# Patient Record
Sex: FEMALE | Race: WHITE | HISPANIC OR LATINO | URBAN - METROPOLITAN AREA
[De-identification: names, ages, dates, MRNs, and addresses within clinical notes are randomized per-mention and may not be internally consistent; named-entity substitution may affect disease eponyms.]

---

## 2023-03-03 ENCOUNTER — EMERGENCY (EMERGENCY)
Facility: HOSPITAL | Age: 28
LOS: 0 days | Discharge: HOME | End: 2023-03-04
Attending: STUDENT IN AN ORGANIZED HEALTH CARE EDUCATION/TRAINING PROGRAM
Payer: MEDICAID

## 2023-03-03 VITALS
OXYGEN SATURATION: 97 % | RESPIRATION RATE: 18 BRPM | HEART RATE: 99 BPM | SYSTOLIC BLOOD PRESSURE: 128 MMHG | HEIGHT: 60 IN | WEIGHT: 156.53 LBS | DIASTOLIC BLOOD PRESSURE: 88 MMHG | TEMPERATURE: 99 F

## 2023-03-03 DIAGNOSIS — R10.31 RIGHT LOWER QUADRANT PAIN: ICD-10-CM

## 2023-03-03 DIAGNOSIS — R13.10 DYSPHAGIA, UNSPECIFIED: ICD-10-CM

## 2023-03-03 DIAGNOSIS — R11.0 NAUSEA: ICD-10-CM

## 2023-03-03 LAB
ALBUMIN SERPL ELPH-MCNC: 5 G/DL — SIGNIFICANT CHANGE UP (ref 3.5–5.2)
ALP SERPL-CCNC: 74 U/L — SIGNIFICANT CHANGE UP (ref 30–115)
ALT FLD-CCNC: 54 U/L — HIGH (ref 0–41)
ANION GAP SERPL CALC-SCNC: 12 MMOL/L — SIGNIFICANT CHANGE UP (ref 7–14)
APPEARANCE UR: CLEAR — SIGNIFICANT CHANGE UP
AST SERPL-CCNC: 31 U/L — SIGNIFICANT CHANGE UP (ref 0–41)
BASOPHILS # BLD AUTO: 0.05 K/UL — SIGNIFICANT CHANGE UP (ref 0–0.2)
BASOPHILS NFR BLD AUTO: 0.5 % — SIGNIFICANT CHANGE UP (ref 0–1)
BILIRUB SERPL-MCNC: 0.4 MG/DL — SIGNIFICANT CHANGE UP (ref 0.2–1.2)
BILIRUB UR-MCNC: NEGATIVE — SIGNIFICANT CHANGE UP
BUN SERPL-MCNC: 9 MG/DL — LOW (ref 10–20)
CALCIUM SERPL-MCNC: 10.1 MG/DL — SIGNIFICANT CHANGE UP (ref 8.4–10.5)
CHLORIDE SERPL-SCNC: 104 MMOL/L — SIGNIFICANT CHANGE UP (ref 98–110)
CO2 SERPL-SCNC: 24 MMOL/L — SIGNIFICANT CHANGE UP (ref 17–32)
COLOR SPEC: SIGNIFICANT CHANGE UP
CREAT SERPL-MCNC: 0.6 MG/DL — LOW (ref 0.7–1.5)
DIFF PNL FLD: NEGATIVE — SIGNIFICANT CHANGE UP
EGFR: 125 ML/MIN/1.73M2 — SIGNIFICANT CHANGE UP
EOSINOPHIL # BLD AUTO: 0.1 K/UL — SIGNIFICANT CHANGE UP (ref 0–0.7)
EOSINOPHIL NFR BLD AUTO: 0.9 % — SIGNIFICANT CHANGE UP (ref 0–8)
GLUCOSE SERPL-MCNC: 87 MG/DL — SIGNIFICANT CHANGE UP (ref 70–99)
GLUCOSE UR QL: NEGATIVE — SIGNIFICANT CHANGE UP
HCG SERPL QL: NEGATIVE — SIGNIFICANT CHANGE UP
HCT VFR BLD CALC: 39 % — SIGNIFICANT CHANGE UP (ref 37–47)
HGB BLD-MCNC: 13.1 G/DL — SIGNIFICANT CHANGE UP (ref 12–16)
IMM GRANULOCYTES NFR BLD AUTO: 0.4 % — HIGH (ref 0.1–0.3)
KETONES UR-MCNC: ABNORMAL
LEUKOCYTE ESTERASE UR-ACNC: NEGATIVE — SIGNIFICANT CHANGE UP
LIDOCAIN IGE QN: 27 U/L — SIGNIFICANT CHANGE UP (ref 7–60)
LYMPHOCYTES # BLD AUTO: 3.22 K/UL — SIGNIFICANT CHANGE UP (ref 1.2–3.4)
LYMPHOCYTES # BLD AUTO: 30.1 % — SIGNIFICANT CHANGE UP (ref 20.5–51.1)
MCHC RBC-ENTMCNC: 29.8 PG — SIGNIFICANT CHANGE UP (ref 27–31)
MCHC RBC-ENTMCNC: 33.6 G/DL — SIGNIFICANT CHANGE UP (ref 32–37)
MCV RBC AUTO: 88.8 FL — SIGNIFICANT CHANGE UP (ref 81–99)
MONOCYTES # BLD AUTO: 0.62 K/UL — HIGH (ref 0.1–0.6)
MONOCYTES NFR BLD AUTO: 5.8 % — SIGNIFICANT CHANGE UP (ref 1.7–9.3)
NEUTROPHILS # BLD AUTO: 6.65 K/UL — HIGH (ref 1.4–6.5)
NEUTROPHILS NFR BLD AUTO: 62.3 % — SIGNIFICANT CHANGE UP (ref 42.2–75.2)
NITRITE UR-MCNC: NEGATIVE — SIGNIFICANT CHANGE UP
NRBC # BLD: 0 /100 WBCS — SIGNIFICANT CHANGE UP (ref 0–0)
PH UR: 7 — SIGNIFICANT CHANGE UP (ref 5–8)
PLATELET # BLD AUTO: 307 K/UL — SIGNIFICANT CHANGE UP (ref 130–400)
POTASSIUM SERPL-MCNC: 4 MMOL/L — SIGNIFICANT CHANGE UP (ref 3.5–5)
POTASSIUM SERPL-SCNC: 4 MMOL/L — SIGNIFICANT CHANGE UP (ref 3.5–5)
PROT SERPL-MCNC: 7.5 G/DL — SIGNIFICANT CHANGE UP (ref 6–8)
PROT UR-MCNC: NEGATIVE — SIGNIFICANT CHANGE UP
RBC # BLD: 4.39 M/UL — SIGNIFICANT CHANGE UP (ref 4.2–5.4)
RBC # FLD: 12.5 % — SIGNIFICANT CHANGE UP (ref 11.5–14.5)
SODIUM SERPL-SCNC: 140 MMOL/L — SIGNIFICANT CHANGE UP (ref 135–146)
SP GR SPEC: 1.01 — SIGNIFICANT CHANGE UP (ref 1.01–1.03)
UROBILINOGEN FLD QL: SIGNIFICANT CHANGE UP
WBC # BLD: 10.68 K/UL — SIGNIFICANT CHANGE UP (ref 4.8–10.8)
WBC # FLD AUTO: 10.68 K/UL — SIGNIFICANT CHANGE UP (ref 4.8–10.8)

## 2023-03-03 PROCEDURE — 99284 EMERGENCY DEPT VISIT MOD MDM: CPT

## 2023-03-03 PROCEDURE — 83690 ASSAY OF LIPASE: CPT

## 2023-03-03 PROCEDURE — 36415 COLL VENOUS BLD VENIPUNCTURE: CPT

## 2023-03-03 PROCEDURE — 87086 URINE CULTURE/COLONY COUNT: CPT

## 2023-03-03 PROCEDURE — 74177 CT ABD & PELVIS W/CONTRAST: CPT | Mod: MA

## 2023-03-03 PROCEDURE — 96375 TX/PRO/DX INJ NEW DRUG ADDON: CPT

## 2023-03-03 PROCEDURE — 99284 EMERGENCY DEPT VISIT MOD MDM: CPT | Mod: 25

## 2023-03-03 PROCEDURE — 80053 COMPREHEN METABOLIC PANEL: CPT

## 2023-03-03 PROCEDURE — 81003 URINALYSIS AUTO W/O SCOPE: CPT

## 2023-03-03 PROCEDURE — 76830 TRANSVAGINAL US NON-OB: CPT

## 2023-03-03 PROCEDURE — 84703 CHORIONIC GONADOTROPIN ASSAY: CPT

## 2023-03-03 PROCEDURE — 96374 THER/PROPH/DIAG INJ IV PUSH: CPT

## 2023-03-03 PROCEDURE — 70491 CT SOFT TISSUE NECK W/DYE: CPT | Mod: MA

## 2023-03-03 PROCEDURE — 85025 COMPLETE CBC W/AUTO DIFF WBC: CPT

## 2023-03-03 RX ORDER — ONDANSETRON 8 MG/1
4 TABLET, FILM COATED ORAL ONCE
Refills: 0 | Status: COMPLETED | OUTPATIENT
Start: 2023-03-03 | End: 2023-03-03

## 2023-03-03 RX ORDER — FAMOTIDINE 10 MG/ML
20 INJECTION INTRAVENOUS ONCE
Refills: 0 | Status: COMPLETED | OUTPATIENT
Start: 2023-03-03 | End: 2023-03-03

## 2023-03-03 RX ADMIN — FAMOTIDINE 100 MILLIGRAM(S): 10 INJECTION INTRAVENOUS at 22:35

## 2023-03-03 RX ADMIN — ONDANSETRON 4 MILLIGRAM(S): 8 TABLET, FILM COATED ORAL at 22:35

## 2023-03-03 NOTE — ED PROVIDER NOTE - CARE PROVIDERS DIRECT ADDRESSES
[email or web address]

## 2023-03-03 NOTE — ED ADULT TRIAGE NOTE - CHIEF COMPLAINT QUOTE
She has abdominal pain and she feels nauseous, her last period is February 15th, whatever she eats she's vomiting ans she feels like the food stays in her throat - friend

## 2023-03-03 NOTE — ED PROVIDER NOTE - CARE PROVIDER_API CALL
Shey Conway)  Gastroenterology  [address] [place] [address]  Phone: [phone]  Fax: [phone]  Follow Up Time: 1-3 Days    Bruce Esquivel)  Otolaryngology  [address] [place] [address], [address] [place] [address]  Phone: [phone]  Fax: [phone]  Follow Up Time: 1-3 Days

## 2023-03-03 NOTE — ED PROVIDER NOTE - OBJECTIVE STATEMENT
Pt is a 26 y/o female with no PMH presenting for abdominal pain x 4 days. Pain is in RLQ and associated with nausea. Endorses feeling like food gets stuck in her throat when she eats. Has been tolerating liquids but occasionally feels like it's stuck. No vomiting. No dysuria or diarrhea. LMP 2/15

## 2023-03-03 NOTE — ED PROVIDER NOTE - PROGRESS NOTE DETAILS
PS: Pt tolerated po well. No abdominal pain, abdomen soft and nontender. Will dc to follow up with GI and ENT

## 2023-03-03 NOTE — ED PROVIDER NOTE - PHYSICAL EXAMINATION
CONST: well appearing for age  HEAD:  normocephalic, atraumatic  EYES:  conjunctivae without injection, drainage or discharge  ENMT:  nasal mucosa moist; mouth moist without ulcerations or lesions; throat moist without erythema, exudate, ulcerations or lesions  NECK:  supple  CARDIAC:  regular rate and rhythm, normal S1 and S2, no murmurs, rubs or gallops  RESP:  respiratory rate and effort appear normal for age; lungs are clear to auscultation bilaterally; no rales or wheezes  ABDOMEN:  soft, + RLQ tenderness, no CVA tenderness  MUSCULOSKELETAL/NEURO:  normal movement, normal tone  SKIN:  normal skin color for age and race, well-perfused; warm and dry

## 2023-03-03 NOTE — ED ADULT NURSE NOTE - NS ED NOTE ABUSE RESPONSE YN
Chart reviewed, Oumou WALKER contacted Sky today and spoke with him regarding need to see MFM.  Dr. Nelson also spoke with him last week regarding same issue.     Yes

## 2023-03-03 NOTE — ED PROVIDER NOTE - PROVIDER TOKENS
PROVIDER:[TOKEN:[[id number]],FOLLOWUP:[1-3 Days]],PROVIDER:[TOKEN:[[id number]],FOLLOWUP:[1-3 Days]]

## 2023-03-03 NOTE — ED PROVIDER NOTE - NSFOLLOWUPINSTRUCTIONS_ED_ALL_ED_FT
Disfagia    Dysphagia       La disfagia es un problema para tragar. Esta afección se produce cuando los sólidos y líquidos se pegan a la garganta de esmer persona en chisholm recorrido hasta el estómago o cuando el alimento tarda más tiempo del habitual en llegar al estómago.    Puede tener problemas para tragar alimentos, líquidos o ambos. También puede tener dolor cuando intenta tragar. Puede llevarle más tiempo y esfuerzo tragar algo.      ¿Cuáles son las causas?    Esta afección puede ser causada por lo siguiente:  •Problemas musculares. Es posible que estos le dificulten el paso de los alimentos y líquidos por el esófago, el tubo que conecta la boca con el estómago.    •Obstrucciones. Puede tener úlceras, tejido cicatricial o inflamación que bloquea el paso normal de los alimentos y líquidos. Las causas de estos problemas incluyen las siguientes:  •Reflujo ácido desde el estómago hacia el esófago (reflujo gastroesofágico).      •Infecciones.      •Radioterapia para tratar el cáncer.      •Medicamentos que se marsha sin la cantidad suficiente de líquido para empujarlos hacia el estómago.        •Accidente cerebrovascular. Puede afectar los nervios y hacer que sea difícil tragar.      •Problemas neurológicos. Estos impiden que se envíen señales a los músculos del esófago para que se aprieten (contraigan) y muevan lo que traga hacia el estómago.      •Globo faríngeo. Puja es un problema común que consiste en sentir fabio si hubiera esmer obstrucción en la garganta o esmer sensación de problema para tragar incluso si no hay nada mal en las vías de deglución.      •Algunas afecciones, tales fabio parálisis cerebral o enfermedad de Parkinson.        ¿Cuáles son los signos o síntomas?    Los síntomas frecuentes de esta afección incluyen los siguientes:  •Esmer sensación de que los sólidos o líquidos se traban en la garganta antes de llegar al estómago.      •Dolor al tragar.      •Tos o arcadas cuando intenta tragar.      Otros síntomas pueden incluir los siguientes:  •Que los alimentos vuelvan del estómago a la boca (regurgitación).      •Ruidos provenientes de la garganta.      •Molestias en el pecho al tragar.      •Sensación de saciedad cuando traga.      •Babeo, especialmente cuando la garganta está obstruida.      •Acidez estomacal.        ¿Cómo se diagnostica?    Puja trastorno puede diagnosticarse mediante:  •Radiografia con deglución de bario. En esta prueba, tragará un líquido bajwa que se pega en la parte interna del esófago. Luego se marsha radiografías.      •Endoscopía. En esta prueba, se inserta un telescopio flexible por la garganta para dominic el esófago y el estómago.      •Exploraciones por tomografía computarizada (TC) o resonancias magnéticas (RM).        ¿Cómo se trata?    El tratamiento de la disfagia depende de la causa de esta afección:  •Si la causa de la disfagia es el reflujo ácido o esmer infección podrán indicarle medicamentos. Estos pueden incluir antibióticos o medicamentos para la acidez estomacal.      •Si la causa de la disfagia son problemas en los músculos, será necesario seguir esmer terapia para fortalecer estos músculos que intervienen en la deglución. Puede tener que hacer ejercicios específicos para fortalecer o estirar los músculos.      •Si la causa es esmer obstrucción o un bulto, se realizarán procedimientos para remover la obstrucción. Es posible que necesite esmer cirugía y esmer sonda de alimentación.      Puede necesitar hacer cambios en la dieta. Pida instrucciones específicas a chisholm médico.      Siga estas instrucciones en chisholm casa:    Medicamentos     •Use los medicamentos de venta kiesha y los recetados solamente fabio se lo haya indicado el médico.      •Si le recetaron un antibiótico, tómelo fabio se lo haya indicado el médico. No deje de rell el antibiótico aunque comience a sentirse mejor.        Comida y bebida      •Mohan los cambios en la dieta que le haya indicado el médico.      •Trabaje con un especialista en alimentación y nutrición (nutricionista) para crear un plan de alimentación que le ayude a obtener los nutrientes que necesita a fin de mantenerse debbie.      •Coma alimentos blandos que shaun fáciles de tragar.      •Carmella los alimentos en trozos pequeños y coma lentamente. Ingiera bocados pequeños.      •Manténgase sentado y erguido para comer y beber.      • No consuma alcohol ni cafeína. Si necesita ayuda para dejar de consumir estos productos, consulte al médico.      Indicaciones generales     •Controle chisholm peso todos los días para asegurarse de que no está perdiendo peso.      • No consuma ningún producto que contenga nicotina o tabaco. Estos productos incluyen cigarrillos, tabaco para mascar y aparatos de vapeo, fabio los cigarrillos electrónicos. Si necesita ayuda para dejar de consumir estos productos, consulte al médico.      •Cumpla con todas las visitas de seguimiento. [place] es importante.        Comuníquese con un médico si:    •Pierde peso porque no puede tragar.      •Tose cuando gia líquidos.      •Tose y elimina comida parcialmente digerida.        Solicite ayuda de inmediato si:    •No puede tragar la saliva.      •Tiene dificultad para respirar, tiene fiebre o ambos.      •Tiene la voz ronca y tiene problemas para tragar.      Estos síntomas pueden representar un problema grave que constituye esmer emergencia. No espere a dominic si los síntomas desaparecen. Solicite atención médica de inmediato. Comuníquese con el servicio de emergencias de chisholm localidad (911 en los [place]). No conduzca por leisa propios medios hasta el hospital.       Resumen    •La disfagia es un problema para tragar. Esta afección se produce cuando los sólidos y líquidos se quedan en la garganta de esmer persona en chisholm recorrido hasta el estómago. Es posible que tenga tos o arcadas cuando intenta tragar.      •La disfagia tiene muchas causas posibles.      •El tratamiento de la disfagia depende de la causa de la afección.      •Cumpla con todas las visitas de seguimiento. [place] es importante.      Esta información no tiene fabio fin reemplazar el consejo del médico. Asegúrese de hacerle al médico cualquier pregunta que tenga.

## 2023-03-03 NOTE — ED PROVIDER NOTE - ATTENDING CONTRIBUTION TO CARE
27-year-old female no past medical history Lithuanian-speaking  ID [id number] presenting here complaining of 4 days of right lower quadrant abdominal pain and nausea also endorsing feeling food stuck in her throat denies any drooling or changes in her voice no fevers or chills   CONSTITUTIONAL: WA / WN / NAD  HEAD: NCAT  EYES: PERRL; EOMI;   ENT: Normal pharynx; mucous membranes pink/moist, no erythema. airway patent uvula midline  NECK: Supple;   CARD: RRR; nl S1/S2; no M/R/G. Pulses equal bilaterally.  RESP: Respiratory rate and effort are normal; breath sounds clear and equal bilaterally.  ABD: Soft, ND + rlq & periumbilica ttp  MSK/EXT: No gross deformities; full range of motion.  SKIN: Warm and dry;   NEURO: AAOx3, Motor 5/5 x 4 extremities  PSYCH: Memory Intact, Normal Affect

## 2023-03-03 NOTE — ED PROVIDER NOTE - PATIENT PORTAL LINK FT
You can access the FollowMyHealth Patient Portal offered by [facility] by registering at the following website: [email or web address]. By joining [employer]’s FollowMyHealth portal, you will also be able to view your health information using other applications (apps) compatible with our system.

## 2023-03-04 VITALS
OXYGEN SATURATION: 95 % | RESPIRATION RATE: 18 BRPM | HEART RATE: 95 BPM | TEMPERATURE: 98 F | DIASTOLIC BLOOD PRESSURE: 72 MMHG | SYSTOLIC BLOOD PRESSURE: 126 MMHG

## 2023-03-04 PROCEDURE — 76830 TRANSVAGINAL US NON-OB: CPT | Mod: 26

## 2023-03-04 PROCEDURE — 70491 CT SOFT TISSUE NECK W/DYE: CPT | Mod: 26,MA

## 2023-03-04 PROCEDURE — 74177 CT ABD & PELVIS W/CONTRAST: CPT | Mod: 26,MA

## 2023-03-04 RX ORDER — ONDANSETRON 8 MG/1
1 TABLET, FILM COATED ORAL
Qty: 6 | Refills: 0
Start: 2023-03-04 | End: 2023-03-05

## 2023-03-04 NOTE — ED ADULT NURSE REASSESSMENT NOTE - NS ED NURSE REASSESS COMMENT FT1
Pt remains AxOx4. Awaiting final CT results and ultrasound results. NAD. Safety measures maintained. Purposeful rounding maintained.

## 2023-03-04 NOTE — ED POST DISCHARGE NOTE - DETAILS
recommended gyn follow up for ultrasound of pelvis SPOKE WITH PATIENT IN Armenian. REFER. COORD. WILL MAKE GYN APPT.

## 2023-03-05 LAB
CULTURE RESULTS: SIGNIFICANT CHANGE UP
SPECIMEN SOURCE: SIGNIFICANT CHANGE UP

## 2023-03-06 PROBLEM — Z00.00 ENCOUNTER FOR PREVENTIVE HEALTH EXAMINATION: Status: ACTIVE | Noted: 2023-03-06

## 2023-03-16 ENCOUNTER — EMERGENCY (EMERGENCY)
Facility: HOSPITAL | Age: 28
LOS: 0 days | Discharge: HOME | End: 2023-03-17
Attending: EMERGENCY MEDICINE
Payer: MEDICAID

## 2023-03-16 ENCOUNTER — APPOINTMENT (OUTPATIENT)
Dept: OTOLARYNGOLOGY | Facility: CLINIC | Age: 28
End: 2023-03-16
Payer: COMMERCIAL

## 2023-03-16 VITALS
SYSTOLIC BLOOD PRESSURE: 139 MMHG | HEIGHT: 60 IN | DIASTOLIC BLOOD PRESSURE: 74 MMHG | TEMPERATURE: 97 F | OXYGEN SATURATION: 98 % | WEIGHT: 149.91 LBS | RESPIRATION RATE: 15 BRPM | HEART RATE: 109 BPM

## 2023-03-16 VITALS
OXYGEN SATURATION: 98 % | DIASTOLIC BLOOD PRESSURE: 73 MMHG | TEMPERATURE: 99 F | RESPIRATION RATE: 16 BRPM | HEART RATE: 97 BPM | SYSTOLIC BLOOD PRESSURE: 100 MMHG

## 2023-03-16 DIAGNOSIS — J02.9 ACUTE PHARYNGITIS, UNSPECIFIED: ICD-10-CM

## 2023-03-16 DIAGNOSIS — R11.2 NAUSEA WITH VOMITING, UNSPECIFIED: ICD-10-CM

## 2023-03-16 DIAGNOSIS — R10.13 EPIGASTRIC PAIN: ICD-10-CM

## 2023-03-16 DIAGNOSIS — K21.9 GASTRO-ESOPHAGEAL REFLUX DISEASE WITHOUT ESOPHAGITIS: ICD-10-CM

## 2023-03-16 LAB
ALBUMIN SERPL ELPH-MCNC: 4.9 G/DL — SIGNIFICANT CHANGE UP (ref 3.5–5.2)
ALP SERPL-CCNC: 78 U/L — SIGNIFICANT CHANGE UP (ref 30–115)
ALT FLD-CCNC: 38 U/L — SIGNIFICANT CHANGE UP (ref 0–41)
ANION GAP SERPL CALC-SCNC: 17 MMOL/L — HIGH (ref 7–14)
AST SERPL-CCNC: 28 U/L — SIGNIFICANT CHANGE UP (ref 0–41)
BASOPHILS # BLD AUTO: 0.06 K/UL — SIGNIFICANT CHANGE UP (ref 0–0.2)
BASOPHILS NFR BLD AUTO: 0.5 % — SIGNIFICANT CHANGE UP (ref 0–1)
BILIRUB SERPL-MCNC: 0.6 MG/DL — SIGNIFICANT CHANGE UP (ref 0.2–1.2)
BUN SERPL-MCNC: 12 MG/DL — SIGNIFICANT CHANGE UP (ref 10–20)
CALCIUM SERPL-MCNC: 9.7 MG/DL — SIGNIFICANT CHANGE UP (ref 8.4–10.5)
CHLORIDE SERPL-SCNC: 103 MMOL/L — SIGNIFICANT CHANGE UP (ref 98–110)
CO2 SERPL-SCNC: 18 MMOL/L — SIGNIFICANT CHANGE UP (ref 17–32)
CREAT SERPL-MCNC: 0.7 MG/DL — SIGNIFICANT CHANGE UP (ref 0.7–1.5)
EGFR: 121 ML/MIN/1.73M2 — SIGNIFICANT CHANGE UP
EOSINOPHIL # BLD AUTO: 0.03 K/UL — SIGNIFICANT CHANGE UP (ref 0–0.7)
EOSINOPHIL NFR BLD AUTO: 0.2 % — SIGNIFICANT CHANGE UP (ref 0–8)
GLUCOSE SERPL-MCNC: 90 MG/DL — SIGNIFICANT CHANGE UP (ref 70–99)
HCG SERPL QL: POSITIVE — SIGNIFICANT CHANGE UP
HCT VFR BLD CALC: 38.5 % — SIGNIFICANT CHANGE UP (ref 37–47)
HGB BLD-MCNC: 13.4 G/DL — SIGNIFICANT CHANGE UP (ref 12–16)
IMM GRANULOCYTES NFR BLD AUTO: 0.2 % — SIGNIFICANT CHANGE UP (ref 0.1–0.3)
LIDOCAIN IGE QN: 24 U/L — SIGNIFICANT CHANGE UP (ref 7–60)
LYMPHOCYTES # BLD AUTO: 1.84 K/UL — SIGNIFICANT CHANGE UP (ref 1.2–3.4)
LYMPHOCYTES # BLD AUTO: 14.9 % — LOW (ref 20.5–51.1)
MCHC RBC-ENTMCNC: 30.5 PG — SIGNIFICANT CHANGE UP (ref 27–31)
MCHC RBC-ENTMCNC: 34.8 G/DL — SIGNIFICANT CHANGE UP (ref 32–37)
MCV RBC AUTO: 87.7 FL — SIGNIFICANT CHANGE UP (ref 81–99)
MONOCYTES # BLD AUTO: 0.6 K/UL — SIGNIFICANT CHANGE UP (ref 0.1–0.6)
MONOCYTES NFR BLD AUTO: 4.9 % — SIGNIFICANT CHANGE UP (ref 1.7–9.3)
NEUTROPHILS # BLD AUTO: 9.81 K/UL — HIGH (ref 1.4–6.5)
NEUTROPHILS NFR BLD AUTO: 79.3 % — HIGH (ref 42.2–75.2)
NRBC # BLD: 0 /100 WBCS — SIGNIFICANT CHANGE UP (ref 0–0)
PLATELET # BLD AUTO: 315 K/UL — SIGNIFICANT CHANGE UP (ref 130–400)
POTASSIUM SERPL-MCNC: 4.1 MMOL/L — SIGNIFICANT CHANGE UP (ref 3.5–5)
POTASSIUM SERPL-SCNC: 4.1 MMOL/L — SIGNIFICANT CHANGE UP (ref 3.5–5)
PROT SERPL-MCNC: 7.5 G/DL — SIGNIFICANT CHANGE UP (ref 6–8)
RBC # BLD: 4.39 M/UL — SIGNIFICANT CHANGE UP (ref 4.2–5.4)
RBC # FLD: 12.6 % — SIGNIFICANT CHANGE UP (ref 11.5–14.5)
SODIUM SERPL-SCNC: 138 MMOL/L — SIGNIFICANT CHANGE UP (ref 135–146)
WBC # BLD: 12.37 K/UL — HIGH (ref 4.8–10.8)
WBC # FLD AUTO: 12.37 K/UL — HIGH (ref 4.8–10.8)

## 2023-03-16 PROCEDURE — 76705 ECHO EXAM OF ABDOMEN: CPT | Mod: 26

## 2023-03-16 PROCEDURE — 96374 THER/PROPH/DIAG INJ IV PUSH: CPT

## 2023-03-16 PROCEDURE — 80053 COMPREHEN METABOLIC PANEL: CPT

## 2023-03-16 PROCEDURE — 84702 CHORIONIC GONADOTROPIN TEST: CPT

## 2023-03-16 PROCEDURE — 99284 EMERGENCY DEPT VISIT MOD MDM: CPT | Mod: 25

## 2023-03-16 PROCEDURE — 76705 ECHO EXAM OF ABDOMEN: CPT

## 2023-03-16 PROCEDURE — 31575 DIAGNOSTIC LARYNGOSCOPY: CPT

## 2023-03-16 PROCEDURE — 96375 TX/PRO/DX INJ NEW DRUG ADDON: CPT

## 2023-03-16 PROCEDURE — 99284 EMERGENCY DEPT VISIT MOD MDM: CPT

## 2023-03-16 PROCEDURE — 83690 ASSAY OF LIPASE: CPT

## 2023-03-16 PROCEDURE — 36415 COLL VENOUS BLD VENIPUNCTURE: CPT

## 2023-03-16 PROCEDURE — 85025 COMPLETE CBC W/AUTO DIFF WBC: CPT

## 2023-03-16 PROCEDURE — 99203 OFFICE O/P NEW LOW 30 MIN: CPT | Mod: 25

## 2023-03-16 PROCEDURE — 84703 CHORIONIC GONADOTROPIN ASSAY: CPT

## 2023-03-16 RX ORDER — FAMOTIDINE 10 MG/ML
20 INJECTION INTRAVENOUS ONCE
Refills: 0 | Status: COMPLETED | OUTPATIENT
Start: 2023-03-16 | End: 2023-03-16

## 2023-03-16 RX ORDER — SODIUM CHLORIDE 9 MG/ML
2100 INJECTION, SOLUTION INTRAVENOUS ONCE
Refills: 0 | Status: COMPLETED | OUTPATIENT
Start: 2023-03-16 | End: 2023-03-16

## 2023-03-16 RX ORDER — DIPHENHYDRAMINE HYDROCHLORIDE AND LIDOCAINE HYDROCHLORIDE AND ALUMINUM HYDROXIDE AND MAGNESIUM HYDRO
30 KIT ONCE
Refills: 0 | Status: COMPLETED | OUTPATIENT
Start: 2023-03-16 | End: 2023-03-16

## 2023-03-16 RX ORDER — ONDANSETRON 8 MG/1
4 TABLET, FILM COATED ORAL ONCE
Refills: 0 | Status: COMPLETED | OUTPATIENT
Start: 2023-03-16 | End: 2023-03-16

## 2023-03-16 RX ADMIN — DIPHENHYDRAMINE HYDROCHLORIDE AND LIDOCAINE HYDROCHLORIDE AND ALUMINUM HYDROXIDE AND MAGNESIUM HYDRO 30 MILLILITER(S): KIT at 21:50

## 2023-03-16 RX ADMIN — SODIUM CHLORIDE 2100 MILLILITER(S): 9 INJECTION, SOLUTION INTRAVENOUS at 20:28

## 2023-03-16 RX ADMIN — FAMOTIDINE 20 MILLIGRAM(S): 10 INJECTION INTRAVENOUS at 20:28

## 2023-03-16 RX ADMIN — ONDANSETRON 4 MILLIGRAM(S): 8 TABLET, FILM COATED ORAL at 20:28

## 2023-03-16 NOTE — ED PROVIDER NOTE - OBJECTIVE STATEMENT
Yes, if that helps he can take that or Maalox. 27 yold female to Ed Lmp 2/14 G0 with no signif med hx Romansh speaking(intrep #[id number] Julee) c/o sore throat, difficulty swallowing, throat pain, epigastric abdominal pain radiating to throat x 2 weeks with n/v, dizziness; pt seen here 2 weeks ago ct throat, ct abd/pelvis done unremarkable, pelvic us done with ovarian cyst; pt seen today by ent scoped nothing found as per pt and pt started on augmentin and medrol dose pack; pt returns for persistent pain epigastric and RUQ not able to tolerate po; denies fever, chills, melena, brbpr or back pain;

## 2023-03-16 NOTE — REASON FOR VISIT
[Initial Evaluation] : an initial evaluation for [FreeTextEntry2] :  c/o nausea , vomiting ,  food stuck in throat

## 2023-03-16 NOTE — ED PROVIDER NOTE - NSFOLLOWUPINSTRUCTIONS_ED_ALL_ED_FT
Enfermedad de reflujo gastroesofágico en los adultos    Gastroesophageal Reflux Disease, Adult       El reflujo gastroesofágico (RGE) ocurre cuando el ácido del estómago sube por el tubo que conecta la boca con el estómago (esófago). Normalmente, la comida baja por el esófago y se mantiene en el estómago, donde se la digiere. Sin embargo, cuando esmer persona tiene ERGE, los alimentos y el ácido estomacal suelen volver al esófago. Si esto se vuelve un problema más grave, a la persona se le puede diagnosticar esmer enfermedad llamada enfermedad de reflujo gastroesofágico (ERGE). La ERGE ocurre cuando el reflujo:  •Sucede a menudo.       •Causa síntomas frecuentes o graves.       •Causa problemas tales fabio daño en el esófago.      Cuando el ácido del estómago entra en contacto con el esófago, el ácido puede provocar inflamación en el esófago. Con el tiempo, pueden formarse pequeños agujeros (úlceras) en el revestimiento del esófago.      ¿Cuáles son las causas?    Esta afección se debe a un problema en el músculo que se encuentra entre el esófago y el estómago (esfínter esofágico inferior, o EEI). Normalmente, el EEI se ulises esmer vez que la comida pasa a través del esófago hasta el estómago. Cuando el EEI se encuentra debilitado o tiene alguna anomalía, no se ulises por completo, y eso permite que tanto la comida fabio el jugo gástrico, que es ácido, vuelvan a subir por el esófago.    El EEI puede debilitarse a causa de ciertas sustancias alimenticias, medicamentos y afecciones, que incluyen:  •El consumo de tabaco.      •Embarazo.      •Tener esmer hernia de hiato.      •Consumo de alcohol.      •Ciertos alimentos y bebidas, fabio café, chocolate, cebollas y menta.        ¿Qué incrementa el riesgo?    Es más probable que tenga esta afección si:  •Tiene un aumento del peso corporal.      •Tiene un trastorno del tejido conjuntivo.      •Beth antiinflamatorios no esteroideos (RODO), fabio el ibuprofeno.        ¿Cuáles son los signos o síntomas?    Los síntomas de esta afección incluyen:  •Acidez estomacal.      •Dificultad o dolor para tragar o la sensación de tener un bulto en la garganta.      •Sabor amargo en la boca.      •Mal aliento y tener gran cantidad de saliva.      •Estómago inflamado o con malestar y eructos.      •Dolor en el pecho. El dolor de pecho puede deberse a distintas afecciones. Es importante que consulte al médico si tiene dolor de pecho.      •Dificultad para respirar o sibilancias.      •Tos tyler (crónica) o tos nocturna.      •Desgaste del esmalte dental.      •Pérdida de peso.        ¿Cómo se diagnostica?    Esta afección se puede diagnosticar en función de los antecedentes médicos y un examen físico. Para determinar si tiene ERGE leve o grave, el médico también puede controlar cómo usted reacciona al tratamiento. También pueden hacerle estudios, que incluyen los siguientes:  •Un estudio para examinarle el estómago y el esófago con esmer cámara pequeña (endoscopía).      •Esmer prueba para medir el calvin de acidez en el esófago.      •Esmer prueba para medir cuánta presión hay en el esófago.      •Un estudio de deglución con bario común o modificado para dominic la forma, el tamaño y el funcionamiento del esófago.        ¿Cómo se trata?    El tratamiento de esta afección puede variar según la gravedad de los síntomas. El médico puede recomendarle lo siguiente:  •Cambios en la dieta.      •Medicamentos.      •Cirugía.      El objetivo del tratamiento es ayudar a aliviar los síntomas y evitar las complicaciones.      Siga estas instrucciones en chisholm casa:      Comida y bebida    •Siga la dieta recomendada por el médico. [place] puede incluir evitar ciertos alimentos y bebidas, por ejemplo:  •Café y té luis alfredo, con o sin cafeína.      •Bebidas que contengan alcohol.      •Bebidas energéticas y deportivas.      •Bebidas gaseosas o refrescos.      •Chocolate y cacao.      •Menta y esencia de menta.      •[place] y cebolla.      •Amber ruiz.      •Alimentos condimentados, picantes y ácidos, por ejemplo, todos los tipos de pimientas, chile en polvo, green en polvo, vinagre, salsas picantes y salsa barbacoa.      •Cítricos y leisa jugos, por ejemplo, naranjas, pauly y neha.      •Alimentos a base de tomate, fabio salsa de tomate, chile, salsa picante y pizza con salsa de tomate.      •Alimentos fritos y grasos, fabio donas, christine fritas y aderezos ricos en grasas.      •Kady con alto contenido de grasa, fabio salchichas, y glynn de kady claudio y jorge luis con mucha grasa, por ejemplo, chuletas o costillas, embutidos, jamón y tocino.      •Productos lácteos ricos en grasas, fabio leche entera, manteca y queso crema.        •Liam comidas pequeñas y frecuentes hortencia el día en lugar de comidas abundantes.      •Evite beber grandes cantidades de líquidos con las comidas.      •Evite comer 2 o 3 horas antes de acostarse.      •Evite recostarse inmediatamente después de comer.      • No liam ejercicios enseguida después de comer.        Estilo de ginny      • No consuma ningún producto que contenga nicotina o tabaco. Estos productos incluyen cigarrillos, tabaco para mascar y aparatos de vapeo, fabio los cigarrillos electrónicos. Si necesita ayuda para dejar de fumar, consulte al médico.      •Trate de reducir el estrés con métodos fabio el yoga o la meditación. Si necesita ayuda para reducir el nivel de estrés, consulte al médico.      •Si tiene sobrepeso, baje hasta llegar a un peso saludable para usted. Pídale consejos al médico para bajar de peso de manera edmonds.      Instrucciones generales     •Esté atento a cualquier cambio en los síntomas.      •Use los medicamentos de venta kiesha y los recetados solamente fabio se lo haya indicado el médico. No tome aspirina, ibuprofeno ni otros antiinflamatorios no esteroideos (RODO) a menos que el médico le haya indicado que tome estos medicamentos.      •Use ropa suelta. No use nada apretado alrededor de la cintura que liam presión sobre el abdomen.      •Levante (eleve) la cabecera de la cama aproximadamente 6 pulgadas (15 cm). Para hacerlo puede usar esmer cuña.      •Evite inclinarse si al hacerlo empeoran los síntomas.      •Cumpla con todas las visitas de seguimiento. [place] es importante.        Comuníquese con un médico si:  •Tiene los siguientes síntomas:  •Síntomas nuevos.      •Pérdida de peso sin causa aparente.      •Dificultad o dolor al tragar.      •Sibilancias o esmer tos persistente.      •Voz ronca.        •Los síntomas no mejoran con el tratamiento.        Solicite ayuda de inmediato si:    •Siente dolor repentino en los brazos, el renny, la mandíbula, los dientes o la espalda.      •De repente se siente transpirado, mareado o aturdido.      •Siente falta de aire o dolor en el pecho.      •Vomita y el vómito es de color marlo, amarillo o luis alfredo, o tiene un aspecto similar a la jeremias o a los posos de café.      •Se desmaya.      •Tiene heces claudio, sanguinolentas o negras.      •No puede tragar, beber o comer.      Estos síntomas pueden representar un problema grave que constituye esmer emergencia. No espere a dominic si los síntomas desaparecen. Solicite atención médica de inmediato. Comuníquese con el servicio de emergencias de chisholm localidad (911 en los [place]). No conduzca por leisa propios medios hasta el hospital.       Resumen    •El reflujo gastroesofágico ocurre cuando el ácido del estómago sube al esófago. La ERGE es esmer enfermedad en la que el reflujo ocurre con frecuencia, causa síntomas frecuentes o graves, o causa problemas tales fabio daño en el esófago.      •El tratamiento de esta afección puede variar según la gravedad de los síntomas. El médico puede indicarle que siga esmer dieta y liam cambios en chisholm estilo de ginny, tome medicamentos o se someta a esmer cirugía.      •Comuníquese con un médico si tiene síntomas nuevos o los síntomas empeoran.      •Use los medicamentos de venta kiesha y los recetados solamente fabio se lo haya indicado el médico. No tome aspirina, ibuprofeno ni otros antiinflamatorios no esteroideos (RODO) a menos que el médico se lo indique.      •Concurra a todas las visitas de seguimiento fabio se lo haya indicado el médico. [place] es importante.      Esta información no tiene fabio fin reemplazar el consejo del médico. Asegúrese de hacerle al médico cualquier pregunta que tenga.

## 2023-03-16 NOTE — ED PROVIDER NOTE - PATIENT PORTAL LINK FT
You can access the FollowMyHealth Patient Portal offered by [facility] by registering at the following website: [email or web address]. By joining [employer]’s FollowMyHealth portal, you will also be able to view your health information using other applications (apps) compatible with our system.

## 2023-03-16 NOTE — ED PROVIDER NOTE - ATTENDING APP SHARED VISIT CONTRIBUTION OF CARE
27 y.o. female, no PMH, LMP 2/14, comes in c/o 2 wk h/o epigastric abdominal pain radiating to throat x 2 weeks assocaited with n/v and dizziness. Pt was seen in the ED 2 weeks ago, had CT soft tissue neck, CT A/P, pelvic US and was d/c'd with follow up. Pt saw ENT today, was scoped and states it was normal. Was sent chelsi on Augmentin and Medrol dose pack. Pt returns to ED for persistent epigastric pain and RUQ pain, not able to tolerate PO. Denies fever, chills, melena/BRBPR, back pain. No urinary symptoms. On exam, pt in NAD, AAOx3, head NC/AT, CN II-XII intact, PEERL, EOMi, neck (-) midline tenderness, lungs CTA B/L, CV S1S2 regular, abdomen soft/(+) epigastric and RUQ pain/ND/(+)BS, ext (-) edema, motor 5/5x4, sensation intact. Will do labs, US, IVF and reevaluate. Pepcid given.

## 2023-03-16 NOTE — PROCEDURE
[None] : none [Flexible Endoscope] : examined with the flexible endoscope [de-identified] : Flexible laryngoscopy performed. Nasal cavity, nasopharynx, oropharynx, hypopharynx, and larynx evaluated. No masses or lesions, bilateral true vocal folds symmetric and mobile.\par

## 2023-03-16 NOTE — HISTORY OF PRESENT ILLNESS
[de-identified] : Angolan id# [id number]\par Patient presents today reporting dysphagia, odynophagia for last 15 days. She has been having vomiting. She has burning sensation in her throat, worst on the right. No trismus or muffled voice, no trouble breathing, no fevers or chills.\par \par No other medical problems. No PCP.

## 2023-03-16 NOTE — ED PROVIDER NOTE - NS ED ATTENDING STATEMENT MOD
This was a shared visit with the DANIELITO. I reviewed and verified the documentation and independently performed the documented:

## 2023-03-16 NOTE — ED PROVIDER NOTE - NSPTACCESSSVCSAPPTDETAILS_ED_ALL_ED_FT
please provide appt for pt with GI in 1 week; multiple visits to ED, diagnostic testing  grossly unremarkable with continued dx;

## 2023-03-16 NOTE — ED PROVIDER NOTE - CLINICAL SUMMARY MEDICAL DECISION MAKING FREE TEXT BOX
Signout received from Dr. Skinner. Patient presents with epigastric abdominal pain radiating to throat, nausea and vomiting.  Had recent ED work-up for similar symptoms and was seen by ENT in the office today and had a laryngoscopy done.  Patient without acute laboratory findings findings.  Symptoms improved and patient tolerated p.o.  Will discharge with outpatient follow-up.

## 2023-03-17 LAB — HCG SERPL-ACNC: <1 MIU/ML — SIGNIFICANT CHANGE UP

## 2023-03-17 RX ORDER — SUCRALFATE 1 G
1 TABLET ORAL
Qty: 60 | Refills: 0
Start: 2023-03-17 | End: 2023-03-31

## 2023-03-17 RX ORDER — PANTOPRAZOLE SODIUM 20 MG/1
1 TABLET, DELAYED RELEASE ORAL
Qty: 14 | Refills: 0
Start: 2023-03-17 | End: 2023-03-30

## 2023-03-29 ENCOUNTER — EMERGENCY (EMERGENCY)
Facility: HOSPITAL | Age: 28
LOS: 0 days | Discharge: HOME | End: 2023-03-30
Attending: EMERGENCY MEDICINE
Payer: MEDICAID

## 2023-03-29 DIAGNOSIS — R10.11 RIGHT UPPER QUADRANT PAIN: ICD-10-CM

## 2023-03-29 DIAGNOSIS — Z3A.01 LESS THAN 8 WEEKS GESTATION OF PREGNANCY: ICD-10-CM

## 2023-03-29 DIAGNOSIS — O23.41 UNSPECIFIED INFECTION OF URINARY TRACT IN PREGNANCY, FIRST TRIMESTER: ICD-10-CM

## 2023-03-29 DIAGNOSIS — K21.9 GASTRO-ESOPHAGEAL REFLUX DISEASE WITHOUT ESOPHAGITIS: ICD-10-CM

## 2023-03-29 DIAGNOSIS — N83.201 UNSPECIFIED OVARIAN CYST, RIGHT SIDE: ICD-10-CM

## 2023-03-29 DIAGNOSIS — R10.31 RIGHT LOWER QUADRANT PAIN: ICD-10-CM

## 2023-03-29 DIAGNOSIS — O26.891 OTHER SPECIFIED PREGNANCY RELATED CONDITIONS, FIRST TRIMESTER: ICD-10-CM

## 2023-03-29 DIAGNOSIS — O99.611 DISEASES OF THE DIGESTIVE SYSTEM COMPLICATING PREGNANCY, FIRST TRIMESTER: ICD-10-CM

## 2023-03-29 DIAGNOSIS — O34.81 MATERNAL CARE FOR OTHER ABNORMALITIES OF PELVIC ORGANS, FIRST TRIMESTER: ICD-10-CM

## 2023-03-29 PROBLEM — Z78.9 OTHER SPECIFIED HEALTH STATUS: Chronic | Status: ACTIVE | Noted: 2023-03-16

## 2023-03-29 LAB
ALBUMIN SERPL ELPH-MCNC: 4.9 G/DL — SIGNIFICANT CHANGE UP (ref 3.5–5.2)
ALP SERPL-CCNC: 66 U/L — SIGNIFICANT CHANGE UP (ref 30–115)
ALT FLD-CCNC: 55 U/L — HIGH (ref 0–41)
ANION GAP SERPL CALC-SCNC: 13 MMOL/L — SIGNIFICANT CHANGE UP (ref 7–14)
APPEARANCE UR: ABNORMAL
AST SERPL-CCNC: 31 U/L — SIGNIFICANT CHANGE UP (ref 0–41)
BACTERIA # UR AUTO: ABNORMAL
BASOPHILS # BLD AUTO: 0.06 K/UL — SIGNIFICANT CHANGE UP (ref 0–0.2)
BASOPHILS NFR BLD AUTO: 0.6 % — SIGNIFICANT CHANGE UP (ref 0–1)
BILIRUB SERPL-MCNC: 0.5 MG/DL — SIGNIFICANT CHANGE UP (ref 0.2–1.2)
BILIRUB UR-MCNC: NEGATIVE — SIGNIFICANT CHANGE UP
BUN SERPL-MCNC: 11 MG/DL — SIGNIFICANT CHANGE UP (ref 10–20)
CALCIUM SERPL-MCNC: 9.6 MG/DL — SIGNIFICANT CHANGE UP (ref 8.4–10.5)
CHLORIDE SERPL-SCNC: 104 MMOL/L — SIGNIFICANT CHANGE UP (ref 98–110)
CO2 SERPL-SCNC: 22 MMOL/L — SIGNIFICANT CHANGE UP (ref 17–32)
COLOR SPEC: YELLOW — SIGNIFICANT CHANGE UP
CREAT SERPL-MCNC: 0.7 MG/DL — SIGNIFICANT CHANGE UP (ref 0.7–1.5)
DIFF PNL FLD: NEGATIVE — SIGNIFICANT CHANGE UP
EGFR: 121 ML/MIN/1.73M2 — SIGNIFICANT CHANGE UP
EOSINOPHIL # BLD AUTO: 0.08 K/UL — SIGNIFICANT CHANGE UP (ref 0–0.7)
EOSINOPHIL NFR BLD AUTO: 0.8 % — SIGNIFICANT CHANGE UP (ref 0–8)
EPI CELLS # UR: 24 /HPF — HIGH (ref 0–5)
GLUCOSE SERPL-MCNC: 84 MG/DL — SIGNIFICANT CHANGE UP (ref 70–99)
GLUCOSE UR QL: SIGNIFICANT CHANGE UP
HCG SERPL-ACNC: HIGH MIU/ML
HCT VFR BLD CALC: 38.4 % — SIGNIFICANT CHANGE UP (ref 37–47)
HGB BLD-MCNC: 13.1 G/DL — SIGNIFICANT CHANGE UP (ref 12–16)
HYALINE CASTS # UR AUTO: 7 /LPF — SIGNIFICANT CHANGE UP (ref 0–7)
IMM GRANULOCYTES NFR BLD AUTO: 0.3 % — SIGNIFICANT CHANGE UP (ref 0.1–0.3)
KETONES UR-MCNC: ABNORMAL
LACTATE SERPL-SCNC: 0.9 MMOL/L — SIGNIFICANT CHANGE UP (ref 0.7–2)
LEUKOCYTE ESTERASE UR-ACNC: ABNORMAL
LIDOCAIN IGE QN: 23 U/L — SIGNIFICANT CHANGE UP (ref 7–60)
LYMPHOCYTES # BLD AUTO: 2.41 K/UL — SIGNIFICANT CHANGE UP (ref 1.2–3.4)
LYMPHOCYTES # BLD AUTO: 22.9 % — SIGNIFICANT CHANGE UP (ref 20.5–51.1)
MCHC RBC-ENTMCNC: 30.5 PG — SIGNIFICANT CHANGE UP (ref 27–31)
MCHC RBC-ENTMCNC: 34.1 G/DL — SIGNIFICANT CHANGE UP (ref 32–37)
MCV RBC AUTO: 89.3 FL — SIGNIFICANT CHANGE UP (ref 81–99)
MONOCYTES # BLD AUTO: 0.65 K/UL — HIGH (ref 0.1–0.6)
MONOCYTES NFR BLD AUTO: 6.2 % — SIGNIFICANT CHANGE UP (ref 1.7–9.3)
NEUTROPHILS # BLD AUTO: 7.28 K/UL — HIGH (ref 1.4–6.5)
NEUTROPHILS NFR BLD AUTO: 69.2 % — SIGNIFICANT CHANGE UP (ref 42.2–75.2)
NITRITE UR-MCNC: NEGATIVE — SIGNIFICANT CHANGE UP
NRBC # BLD: 0 /100 WBCS — SIGNIFICANT CHANGE UP (ref 0–0)
PH UR: 6.5 — SIGNIFICANT CHANGE UP (ref 5–8)
PLATELET # BLD AUTO: 260 K/UL — SIGNIFICANT CHANGE UP (ref 130–400)
POTASSIUM SERPL-MCNC: 3.8 MMOL/L — SIGNIFICANT CHANGE UP (ref 3.5–5)
POTASSIUM SERPL-SCNC: 3.8 MMOL/L — SIGNIFICANT CHANGE UP (ref 3.5–5)
PROT SERPL-MCNC: 7.2 G/DL — SIGNIFICANT CHANGE UP (ref 6–8)
PROT UR-MCNC: ABNORMAL
RBC # BLD: 4.3 M/UL — SIGNIFICANT CHANGE UP (ref 4.2–5.4)
RBC # FLD: 13 % — SIGNIFICANT CHANGE UP (ref 11.5–14.5)
RBC CASTS # UR COMP ASSIST: 5 /HPF — HIGH (ref 0–4)
SODIUM SERPL-SCNC: 139 MMOL/L — SIGNIFICANT CHANGE UP (ref 135–146)
SP GR SPEC: 1.03 — SIGNIFICANT CHANGE UP (ref 1.01–1.03)
UROBILINOGEN FLD QL: ABNORMAL
WBC # BLD: 10.51 K/UL — SIGNIFICANT CHANGE UP (ref 4.8–10.8)
WBC # FLD AUTO: 10.51 K/UL — SIGNIFICANT CHANGE UP (ref 4.8–10.8)
WBC UR QL: 8 /HPF — HIGH (ref 0–5)

## 2023-03-29 PROCEDURE — 99284 EMERGENCY DEPT VISIT MOD MDM: CPT | Mod: 25

## 2023-03-29 PROCEDURE — 87086 URINE CULTURE/COLONY COUNT: CPT

## 2023-03-29 PROCEDURE — 86900 BLOOD TYPING SEROLOGIC ABO: CPT

## 2023-03-29 PROCEDURE — 86901 BLOOD TYPING SEROLOGIC RH(D): CPT

## 2023-03-29 PROCEDURE — 80053 COMPREHEN METABOLIC PANEL: CPT

## 2023-03-29 PROCEDURE — 83690 ASSAY OF LIPASE: CPT

## 2023-03-29 PROCEDURE — 99284 EMERGENCY DEPT VISIT MOD MDM: CPT

## 2023-03-29 PROCEDURE — 76830 TRANSVAGINAL US NON-OB: CPT | Mod: 26

## 2023-03-29 PROCEDURE — 85025 COMPLETE CBC W/AUTO DIFF WBC: CPT

## 2023-03-29 PROCEDURE — 76830 TRANSVAGINAL US NON-OB: CPT

## 2023-03-29 PROCEDURE — 83605 ASSAY OF LACTIC ACID: CPT

## 2023-03-29 PROCEDURE — 81001 URINALYSIS AUTO W/SCOPE: CPT

## 2023-03-29 PROCEDURE — 36415 COLL VENOUS BLD VENIPUNCTURE: CPT

## 2023-03-29 PROCEDURE — 84702 CHORIONIC GONADOTROPIN TEST: CPT

## 2023-03-30 LAB
CULTURE RESULTS: SIGNIFICANT CHANGE UP
SPECIMEN SOURCE: SIGNIFICANT CHANGE UP

## 2023-03-31 NOTE — ED PROVIDER NOTE - CARE PLAN
1 Principal Discharge DX:	Pregnant  Secondary Diagnosis:	Ovarian cyst   Principal Discharge DX:	Pregnant  Secondary Diagnosis:	Ovarian cyst  Secondary Diagnosis:	Acute UTI

## 2023-03-31 NOTE — ED PROVIDER NOTE - ATTENDING APP SHARED VISIT CONTRIBUTION OF CARE
27-year-old female with past medical history of GERD, presents with right pelvic/right lower quadrant pain.  Patient says been going on for 3 days.  Patient also has been dizziness with vomiting.  No chest pain no shortness of breath.  Patient denies any fever, chills, diarrhea, urinary symptoms.  Patient says she may be pregnant.  LMP was 2/27/2023 and patient was seen in the ED 2 weeks ago, and was told possibly pregnant, serum pregnancy was positive, however her hCG level was less than 0.1.  Patient denies any vaginal bleeding.  Patient denies any vaginal discharge.  Last unprotected sex was 2 weeks ago.  Patient also has lower back pain.  Exam: nad, ncat, perrl, eomi, mmm, rrr, ctab, abd soft, mildly tender to palpation right upper quadrant, tender to palpation right lower quadrant and left lower quadrant, no rebound, no guarding, nd aox3, impression: Patient with right lower quadrant/right pelvic pain, patient says thinks is her ovary.  Will check labs, pregnancy status, +/- pelvic ultrasound, +/- CT abdomen pelvis

## 2023-03-31 NOTE — ED PROVIDER NOTE - CLINICAL SUMMARY MEDICAL DECISION MAKING FREE TEXT BOX
Patient with right lower quadrant pain, found to have early pregnancy, and R sided cyst, with IUP on ultrasound.  hCG 11,000.  No vaginal discharge or bleeding.  Patient has mild UTI, will start on antibiotics.  Patient to follow-up with outpatient OB/GYN clinic.  Any ordered labs and EKG were reviewed.  Any imaging was ordered and reviewed by me.  Appropriate medications for patient's presenting complaints were ordered and effects were reassessed.  Patient's records (prior hospital, ED visit, and/or nursing home notes if available) were reviewed.  Additional history was obtained from EMS, family, and/or PCP (where available).  Escalation to admission/observation was considered.  1) However patient feels much better and is comfortable with discharge.  Appropriate follow-up was arranged.

## 2023-03-31 NOTE — ED PROVIDER NOTE - OBJECTIVE STATEMENT
28 y/o female with hx gerd presents to the ED c/o right sided abdominal pain with nausea and vomiting for 3 days. no fever, chills, urinary symptoms, diarrhea. LMP 2/27/23.

## 2023-03-31 NOTE — ED PROVIDER NOTE - NS ED ROS FT
Constitutional: (-) fever  Eyes/ENT: (-) blurry vision, (-) epistaxis  Cardiovascular: (-) chest pain, (-) syncope  Respiratory: (-) cough, (-) shortness of breath  Gastrointestinal: (+) vomiting, (-) diarrhea  Musculoskeletal: (-) neck pain, (-) back pain, (-) joint pain  Integumentary: (-) rash, (-) edema  Neurological: (-) headache, (-) altered mental status  Psychiatric: (-) hallucinations

## 2023-03-31 NOTE — ED PROVIDER NOTE - PHYSICAL EXAMINATION
Vital Signs: I have reviewed the initial vital signs.  Constitutional: NAD, well-nourished, appears stated age, no acute distress.  HEENT: Airway patent, moist MM, no erythema/swelling/deformity of oral structures. EOMI, PERRLA.  CV: regular rate, regular rhythm, well-perfused extremities, 2+ b/l DP and radial pulses equal.  Lungs: BCTA, no increased WOB.  ABD: +epigastric tenderness, suprapubic tenderness, ND, no guarding or rebound, no pulsatile mass, no hernias.   MSK: Neck supple, nontender, nl ROM, no stepoff. Chest nontender. Back nontender in TLS spine or to b/l bony structures or flanks. Ext nontender, nl rom, no deformity.   INTEG: Skin warm, dry, no rash.  NEURO: A&Ox3, normal strength, nl sensation throughout, normal speech.   PSYCH: Calm, cooperative, normal affect and interaction.

## 2023-03-31 NOTE — ED PROVIDER NOTE - PATIENT PORTAL LINK FT
You can access the FollowMyHealth Patient Portal offered by [facility] by registering at the following website: [email or web address]. By joining [facility]’s FollowMyHealth portal, you will also be able to view your health information using other applications (apps) compatible with our system.

## 2023-04-01 VITALS — HEIGHT: 60 IN

## 2023-04-04 ENCOUNTER — APPOINTMENT (OUTPATIENT)
Dept: OBGYN | Facility: CLINIC | Age: 28
End: 2023-04-04

## 2023-04-05 ENCOUNTER — APPOINTMENT (OUTPATIENT)
Dept: GASTROENTEROLOGY | Facility: CLINIC | Age: 28
End: 2023-04-05

## 2023-04-14 ENCOUNTER — INPATIENT (INPATIENT)
Facility: HOSPITAL | Age: 28
LOS: 1 days | Discharge: HOME | DRG: 566 | End: 2023-04-16
Attending: STUDENT IN AN ORGANIZED HEALTH CARE EDUCATION/TRAINING PROGRAM | Admitting: STUDENT IN AN ORGANIZED HEALTH CARE EDUCATION/TRAINING PROGRAM
Payer: MEDICAID

## 2023-04-14 VITALS
OXYGEN SATURATION: 97 % | SYSTOLIC BLOOD PRESSURE: 114 MMHG | RESPIRATION RATE: 17 BRPM | HEART RATE: 88 BPM | TEMPERATURE: 100 F | DIASTOLIC BLOOD PRESSURE: 62 MMHG

## 2023-04-14 DIAGNOSIS — O21.0 MILD HYPEREMESIS GRAVIDARUM: ICD-10-CM

## 2023-04-14 LAB
ALBUMIN SERPL ELPH-MCNC: 4.7 G/DL — SIGNIFICANT CHANGE UP (ref 3.5–5.2)
ALP SERPL-CCNC: 91 U/L — SIGNIFICANT CHANGE UP (ref 30–115)
ALT FLD-CCNC: 82 U/L — HIGH (ref 0–41)
ANION GAP SERPL CALC-SCNC: 14 MMOL/L — SIGNIFICANT CHANGE UP (ref 7–14)
APAP SERPL-MCNC: <5 UG/ML — LOW (ref 10–30)
APPEARANCE UR: ABNORMAL
AST SERPL-CCNC: 37 U/L — SIGNIFICANT CHANGE UP (ref 0–41)
BACTERIA # UR AUTO: ABNORMAL
BASE EXCESS BLDV CALC-SCNC: -0.8 MMOL/L — SIGNIFICANT CHANGE UP (ref -2–3)
BASOPHILS # BLD AUTO: 0.04 K/UL — SIGNIFICANT CHANGE UP (ref 0–0.2)
BASOPHILS NFR BLD AUTO: 0.3 % — SIGNIFICANT CHANGE UP (ref 0–1)
BILIRUB SERPL-MCNC: 0.6 MG/DL — SIGNIFICANT CHANGE UP (ref 0.2–1.2)
BILIRUB UR-MCNC: NEGATIVE — SIGNIFICANT CHANGE UP
BUN SERPL-MCNC: 6 MG/DL — LOW (ref 10–20)
CA-I SERPL-SCNC: 1.22 MMOL/L — SIGNIFICANT CHANGE UP (ref 1.15–1.33)
CALCIUM SERPL-MCNC: 9.9 MG/DL — SIGNIFICANT CHANGE UP (ref 8.4–10.5)
CHLORIDE SERPL-SCNC: 103 MMOL/L — SIGNIFICANT CHANGE UP (ref 98–110)
CO2 SERPL-SCNC: 20 MMOL/L — SIGNIFICANT CHANGE UP (ref 17–32)
COLOR SPEC: SIGNIFICANT CHANGE UP
CREAT SERPL-MCNC: 0.6 MG/DL — LOW (ref 0.7–1.5)
DIFF PNL FLD: NEGATIVE — SIGNIFICANT CHANGE UP
EGFR: 125 ML/MIN/1.73M2 — SIGNIFICANT CHANGE UP
EOSINOPHIL # BLD AUTO: 0.16 K/UL — SIGNIFICANT CHANGE UP (ref 0–0.7)
EOSINOPHIL NFR BLD AUTO: 1.4 % — SIGNIFICANT CHANGE UP (ref 0–8)
EPI CELLS # UR: 4 /HPF — SIGNIFICANT CHANGE UP (ref 0–5)
FLUAV AG NPH QL: SIGNIFICANT CHANGE UP
FLUBV AG NPH QL: SIGNIFICANT CHANGE UP
GAS PNL BLDV: 135 MMOL/L — LOW (ref 136–145)
GAS PNL BLDV: SIGNIFICANT CHANGE UP
GLUCOSE SERPL-MCNC: 91 MG/DL — SIGNIFICANT CHANGE UP (ref 70–99)
GLUCOSE UR QL: NEGATIVE — SIGNIFICANT CHANGE UP
HCG SERPL-ACNC: HIGH MIU/ML
HCO3 BLDV-SCNC: 24 MMOL/L — SIGNIFICANT CHANGE UP (ref 22–29)
HCT VFR BLD CALC: 39.1 % — SIGNIFICANT CHANGE UP (ref 37–47)
HCT VFR BLDA CALC: 41 % — SIGNIFICANT CHANGE UP (ref 39–51)
HGB BLD CALC-MCNC: 13.8 G/DL — SIGNIFICANT CHANGE UP (ref 12.6–17.4)
HGB BLD-MCNC: 13.5 G/DL — SIGNIFICANT CHANGE UP (ref 12–16)
HYALINE CASTS # UR AUTO: 3 /LPF — SIGNIFICANT CHANGE UP (ref 0–7)
IMM GRANULOCYTES NFR BLD AUTO: 0.3 % — SIGNIFICANT CHANGE UP (ref 0.1–0.3)
KETONES UR-MCNC: ABNORMAL
LACTATE BLDV-MCNC: 1.3 MMOL/L — SIGNIFICANT CHANGE UP (ref 0.5–2)
LEUKOCYTE ESTERASE UR-ACNC: NEGATIVE — SIGNIFICANT CHANGE UP
LIDOCAIN IGE QN: 23 U/L — SIGNIFICANT CHANGE UP (ref 7–60)
LYMPHOCYTES # BLD AUTO: 2.93 K/UL — SIGNIFICANT CHANGE UP (ref 1.2–3.4)
LYMPHOCYTES # BLD AUTO: 25.3 % — SIGNIFICANT CHANGE UP (ref 20.5–51.1)
MCHC RBC-ENTMCNC: 30.3 PG — SIGNIFICANT CHANGE UP (ref 27–31)
MCHC RBC-ENTMCNC: 34.5 G/DL — SIGNIFICANT CHANGE UP (ref 32–37)
MCV RBC AUTO: 87.7 FL — SIGNIFICANT CHANGE UP (ref 81–99)
MONOCYTES # BLD AUTO: 0.81 K/UL — HIGH (ref 0.1–0.6)
MONOCYTES NFR BLD AUTO: 7 % — SIGNIFICANT CHANGE UP (ref 1.7–9.3)
NEUTROPHILS # BLD AUTO: 7.58 K/UL — HIGH (ref 1.4–6.5)
NEUTROPHILS NFR BLD AUTO: 65.7 % — SIGNIFICANT CHANGE UP (ref 42.2–75.2)
NITRITE UR-MCNC: NEGATIVE — SIGNIFICANT CHANGE UP
NRBC # BLD: 0 /100 WBCS — SIGNIFICANT CHANGE UP (ref 0–0)
PCO2 BLDV: 37 MMHG — LOW (ref 39–42)
PH BLDV: 7.41 — SIGNIFICANT CHANGE UP (ref 7.32–7.43)
PH UR: 7 — SIGNIFICANT CHANGE UP (ref 5–8)
PLATELET # BLD AUTO: 297 K/UL — SIGNIFICANT CHANGE UP (ref 130–400)
PMV BLD: 11.2 FL — HIGH (ref 7.4–10.4)
PO2 BLDV: 34 MMHG — SIGNIFICANT CHANGE UP
POTASSIUM BLDV-SCNC: 3.2 MMOL/L — LOW (ref 3.5–5.1)
POTASSIUM SERPL-MCNC: 3.8 MMOL/L — SIGNIFICANT CHANGE UP (ref 3.5–5)
POTASSIUM SERPL-SCNC: 3.8 MMOL/L — SIGNIFICANT CHANGE UP (ref 3.5–5)
PROT SERPL-MCNC: 7.3 G/DL — SIGNIFICANT CHANGE UP (ref 6–8)
PROT UR-MCNC: NEGATIVE — SIGNIFICANT CHANGE UP
RBC # BLD: 4.46 M/UL — SIGNIFICANT CHANGE UP (ref 4.2–5.4)
RBC # FLD: 12.6 % — SIGNIFICANT CHANGE UP (ref 11.5–14.5)
RBC CASTS # UR COMP ASSIST: 2 /HPF — SIGNIFICANT CHANGE UP (ref 0–4)
RSV RNA NPH QL NAA+NON-PROBE: SIGNIFICANT CHANGE UP
SALICYLATES SERPL-MCNC: <0.3 MG/DL — LOW (ref 4–30)
SAO2 % BLDV: 60.1 % — SIGNIFICANT CHANGE UP
SARS-COV-2 RNA SPEC QL NAA+PROBE: SIGNIFICANT CHANGE UP
SODIUM SERPL-SCNC: 137 MMOL/L — SIGNIFICANT CHANGE UP (ref 135–146)
SP GR SPEC: 1.01 — LOW (ref 1.01–1.03)
TROPONIN T SERPL-MCNC: <0.01 NG/ML — SIGNIFICANT CHANGE UP
UROBILINOGEN FLD QL: SIGNIFICANT CHANGE UP
WBC # BLD: 11.56 K/UL — HIGH (ref 4.8–10.8)
WBC # FLD AUTO: 11.56 K/UL — HIGH (ref 4.8–10.8)
WBC UR QL: 10 /HPF — HIGH (ref 0–5)

## 2023-04-14 PROCEDURE — 99222 1ST HOSP IP/OBS MODERATE 55: CPT

## 2023-04-14 PROCEDURE — 99291 CRITICAL CARE FIRST HOUR: CPT

## 2023-04-14 PROCEDURE — 70450 CT HEAD/BRAIN W/O DYE: CPT | Mod: 26,MA

## 2023-04-14 PROCEDURE — 76815 OB US LIMITED FETUS(S): CPT | Mod: 26

## 2023-04-14 PROCEDURE — 84100 ASSAY OF PHOSPHORUS: CPT

## 2023-04-14 PROCEDURE — 80048 BASIC METABOLIC PNL TOTAL CA: CPT

## 2023-04-14 PROCEDURE — 36415 COLL VENOUS BLD VENIPUNCTURE: CPT

## 2023-04-14 PROCEDURE — 80053 COMPREHEN METABOLIC PANEL: CPT

## 2023-04-14 PROCEDURE — 86850 RBC ANTIBODY SCREEN: CPT

## 2023-04-14 PROCEDURE — 83735 ASSAY OF MAGNESIUM: CPT

## 2023-04-14 PROCEDURE — 86900 BLOOD TYPING SEROLOGIC ABO: CPT

## 2023-04-14 PROCEDURE — 86901 BLOOD TYPING SEROLOGIC RH(D): CPT

## 2023-04-14 PROCEDURE — 82010 KETONE BODYS QUAN: CPT

## 2023-04-14 PROCEDURE — 85025 COMPLETE CBC W/AUTO DIFF WBC: CPT

## 2023-04-14 PROCEDURE — 76705 ECHO EXAM OF ABDOMEN: CPT | Mod: 26

## 2023-04-14 RX ORDER — SODIUM CHLORIDE 9 MG/ML
1000 INJECTION, SOLUTION INTRAVENOUS ONCE
Refills: 0 | Status: COMPLETED | OUTPATIENT
Start: 2023-04-14 | End: 2023-04-14

## 2023-04-14 RX ORDER — SODIUM CHLORIDE 9 MG/ML
1000 INJECTION, SOLUTION INTRAVENOUS
Refills: 0 | Status: DISCONTINUED | OUTPATIENT
Start: 2023-04-14 | End: 2023-04-16

## 2023-04-14 RX ORDER — FAMOTIDINE 10 MG/ML
20 INJECTION INTRAVENOUS
Refills: 0 | Status: DISCONTINUED | OUTPATIENT
Start: 2023-04-14 | End: 2023-04-16

## 2023-04-14 RX ORDER — PROCHLORPERAZINE MALEATE 5 MG
25 TABLET ORAL EVERY 12 HOURS
Refills: 0 | Status: DISCONTINUED | OUTPATIENT
Start: 2023-04-14 | End: 2023-04-16

## 2023-04-14 RX ORDER — ONDANSETRON 8 MG/1
4 TABLET, FILM COATED ORAL ONCE
Refills: 0 | Status: COMPLETED | OUTPATIENT
Start: 2023-04-14 | End: 2023-04-14

## 2023-04-14 RX ORDER — MORPHINE SULFATE 50 MG/1
2 CAPSULE, EXTENDED RELEASE ORAL ONCE
Refills: 0 | Status: DISCONTINUED | OUTPATIENT
Start: 2023-04-14 | End: 2023-04-14

## 2023-04-14 RX ORDER — CEFTRIAXONE 500 MG/1
1000 INJECTION, POWDER, FOR SOLUTION INTRAMUSCULAR; INTRAVENOUS ONCE
Refills: 0 | Status: COMPLETED | OUTPATIENT
Start: 2023-04-14 | End: 2023-04-14

## 2023-04-14 RX ORDER — ONDANSETRON 8 MG/1
4 TABLET, FILM COATED ORAL EVERY 6 HOURS
Refills: 0 | Status: DISCONTINUED | OUTPATIENT
Start: 2023-04-14 | End: 2023-04-16

## 2023-04-14 RX ORDER — ACETAMINOPHEN 500 MG
650 TABLET ORAL ONCE
Refills: 0 | Status: COMPLETED | OUTPATIENT
Start: 2023-04-14 | End: 2023-04-14

## 2023-04-14 RX ORDER — SUCRALFATE 1 G
1 TABLET ORAL
Refills: 0 | Status: DISCONTINUED | OUTPATIENT
Start: 2023-04-14 | End: 2023-04-16

## 2023-04-14 RX ORDER — CEFTRIAXONE 500 MG/1
1000 INJECTION, POWDER, FOR SOLUTION INTRAMUSCULAR; INTRAVENOUS EVERY 24 HOURS
Refills: 0 | Status: DISCONTINUED | OUTPATIENT
Start: 2023-04-15 | End: 2023-04-16

## 2023-04-14 RX ORDER — PYRIDOXINE HCL (VITAMIN B6) 100 MG
25 TABLET ORAL
Refills: 0 | Status: DISCONTINUED | OUTPATIENT
Start: 2023-04-14 | End: 2023-04-16

## 2023-04-14 RX ADMIN — CEFTRIAXONE 100 MILLIGRAM(S): 500 INJECTION, POWDER, FOR SOLUTION INTRAMUSCULAR; INTRAVENOUS at 06:00

## 2023-04-14 RX ADMIN — SODIUM CHLORIDE 125 MILLILITER(S): 9 INJECTION, SOLUTION INTRAVENOUS at 12:05

## 2023-04-14 RX ADMIN — FAMOTIDINE 100 MILLIGRAM(S): 10 INJECTION INTRAVENOUS at 20:11

## 2023-04-14 RX ADMIN — Medication 25 MILLIGRAM(S): at 20:11

## 2023-04-14 RX ADMIN — SODIUM CHLORIDE 1000 MILLILITER(S): 9 INJECTION, SOLUTION INTRAVENOUS at 02:46

## 2023-04-14 RX ADMIN — SODIUM CHLORIDE 100 MILLILITER(S): 9 INJECTION, SOLUTION INTRAVENOUS at 20:35

## 2023-04-14 RX ADMIN — MORPHINE SULFATE 2 MILLIGRAM(S): 50 CAPSULE, EXTENDED RELEASE ORAL at 06:56

## 2023-04-14 RX ADMIN — ONDANSETRON 4 MILLIGRAM(S): 8 TABLET, FILM COATED ORAL at 20:11

## 2023-04-14 RX ADMIN — ONDANSETRON 4 MILLIGRAM(S): 8 TABLET, FILM COATED ORAL at 03:00

## 2023-04-14 RX ADMIN — SODIUM CHLORIDE 100 MILLILITER(S): 9 INJECTION, SOLUTION INTRAVENOUS at 09:37

## 2023-04-14 RX ADMIN — SODIUM CHLORIDE 1000 MILLILITER(S): 9 INJECTION, SOLUTION INTRAVENOUS at 05:50

## 2023-04-14 RX ADMIN — Medication 25 MILLIGRAM(S): at 20:10

## 2023-04-14 RX ADMIN — ONDANSETRON 4 MILLIGRAM(S): 8 TABLET, FILM COATED ORAL at 05:53

## 2023-04-14 RX ADMIN — ONDANSETRON 4 MILLIGRAM(S): 8 TABLET, FILM COATED ORAL at 11:56

## 2023-04-14 RX ADMIN — Medication 650 MILLIGRAM(S): at 12:00

## 2023-04-14 NOTE — ED PROVIDER NOTE - PLAN OF CARE
wahl, vomiting, post appendectomy/first trimester pregnancy  labs, imaging, monitoring, supportive care, hydration

## 2023-04-14 NOTE — ED PROVIDER NOTE - ATTENDING CONTRIBUTION TO CARE
27-year-old female presents with altered mental status.  Brought in by family in a car.  He states she is currently about a 1 month pregnant.  They say that she had an appendectomy done in the [place] 1 week ago.  Patient's LMP was February 14, 2023.  Family states that she is complaining of right-sided abdominal pain and nausea vomiting and decreased p.o. intake for the last day or 2.  She called her family as she did not feel good so they can picked her up from the [facility] where she lives and brought her down here.  He said that in the car ride she started vomiting and she passed out for about 20 seconds.  On arrival she was unresponsive briefly but responds to tactile stimulation.  Hooked up to the monitor vital signs were within normal limits.  She is in no acute distress.  She is alert and and speaking at this time.  Family is translating Danish at this time.  Patient's complaint at this time is severe headache and nausea and vomiting.  She denies any vaginal bleeding.  She denies fever chest pain shortness of breath.    NCAT PERRL EOMI spine nontender to CTA RRR abdomen soft nontender nondistended.  Bedside ultrasound shows IUP with fetal heart rate.  Laparoscopic scars are well-healing.  There is no discharge from the wounds.  There is no focal deficits no edema no rash.  Rectal temp is afebrile 99.7.

## 2023-04-14 NOTE — ED PROVIDER NOTE - CARE PLAN
Assessment and plan of treatment:	ha, vomiting, post appendectomy/first trimester pregnancy  labs, imaging, monitoring, supportive care, hydration   1 Principal Discharge DX:	Hyperemesis gravidarum  Assessment and plan of treatment:	ha, vomiting, post appendectomy/first trimester pregnancy  labs, imaging, monitoring, supportive care, hydration  Secondary Diagnosis:	Status post appendectomy  Secondary Diagnosis:	UTI in pregnancy, first trimester   Principal Discharge DX:	Pyelonephritis during pregnancy  Assessment and plan of treatment:	ha, vomiting, post appendectomy/first trimester pregnancy  labs, imaging, monitoring, supportive care, hydration  Secondary Diagnosis:	Status post appendectomy  Secondary Diagnosis:	Hyperemesis gravidarum   Principal Discharge DX:	Hyperemesis gravidarum  Assessment and plan of treatment:	ha, vomiting, post appendectomy/first trimester pregnancy  labs, imaging, monitoring, supportive care, hydration  Secondary Diagnosis:	Status post appendectomy  Secondary Diagnosis:	Pyelonephritis during pregnancy  Secondary Diagnosis:	Hyperemesis gravidarum

## 2023-04-14 NOTE — PATIENT PROFILE ADULT - FALL HARM RISK - HARM RISK INTERVENTIONS

## 2023-04-14 NOTE — H&P ADULT - ASSESSMENT
27y  @ 7w0, with suspected hyperemesis gravidum, admitted for inpatient management    -admit to OB  -multivitamin bag in AM with LR for maintenance  -B6, zofran, compazine, sucralfate, pepcid   -AM labs ordered  -regular diet  -rocephin q24hrs for flank pain, r/o pyelo  -f/u UDS, Ucx    Dr. Ge and Dr. Mendes aware   27y  @ 7w0, with suspected hyperemesis gravidum, admitted for inpatient management    -admit to OB  -multivitamin bag in AM with LR for maintenance  -B6, zofran, compazine, sucralfate, pepcid   -AM labs ordered  -regular diet as tolerated  -rocephin q24hrs for flank pain, r/o pyelo  -f/u UDS, Ucx

## 2023-04-14 NOTE — H&P ADULT - ATTENDING COMMENTS
coccyx  27y  @ 7w0, with persistent nausea and vomiting in pregnancy, episode of AMS being admitted to gyn service for antiemetics and treatment of UTI, r/o pyelo. Patient is clinically stable

## 2023-04-14 NOTE — H&P ADULT - NSHPLABSRESULTS_GEN_ALL_CORE
< from: US Appendix (US Appendix .) (04.14.23 @ 09:29) >      ACC: [id number] EXAM:  US APPENDIX   ORDERED BY: BRENDON MONTOYA     PROCEDURE DATE:  04/14/2023          INTERPRETATION:  CLINICAL INFORMATION: Abdominal pain. Patient post   appendectomy. Concern for postoperative complication.    COMPARISON: Pelvic ultrasound dated 4/14/2023    TECHNIQUE: Focused ultrasound of the right lower quadrant.    FINDINGS/  IMPRESSION:    Limited/focused right lower quadrant ultrasound without sonographic   evidence for free fluid or focal collection in the right lower quadrant.    Incidental note made of a 1.8 cm right corpus luteum and intrauterine   gestation.    --- End of Report ---        < end of copied text >    < from: CT Head No Cont (04.14.23 @ 05:22) >      ACC: [id number] EXAM:  CT BRAIN   ORDERED BY: SARAI GONZALEZ     PROCEDURE DATE:  04/14/2023          INTERPRETATION:  CLINICAL INDICATION: Altered mental status.    Technique: CT of the head was performed without contrast.    Multiple contiguous axial images were acquired from the skullbase to the   vertex without the administration of intravenous contrast.  Coronal and   sagittal reformations were made.    COMPARISON: CT soft tissue neck 3/3/2023.    FINDINGS:    The ventricles and sulci are appropriate in size for the patient's age.    There is no intraparenchymal hematoma, mass effect or midline shift. No   abnormal extra-axial fluid collections are present.    The calvarium is intact. The visualized intraorbital compartments,   paranasal sinuses and mastoid complexes appear free of acute disease.    IMPRESSION:    No CT evidence of acute intracranial pathology.    --- End of Report ---          EL PRADO MD; Resident Radiologist  This document has been electronically signed.  VINH VALERO MD; Attending Radiologist  This document has been electronically signed. Apr 14 2023  5:41AM    < end of copied text >

## 2023-04-14 NOTE — ED PROVIDER NOTE - SECONDARY DIAGNOSIS.
UTI in pregnancy, first trimester Status post appendectomy Hyperemesis gravidarum Pyelonephritis during pregnancy

## 2023-04-14 NOTE — CONSULT NOTE ADULT - SUBJECTIVE AND OBJECTIVE BOX
Chief Complaint: Nausea / vomting    HPI: 26 yo  @ 7w0d by LMP (23) presents to the ED with nausea and vomiting, s/p appendectomy 2 weeks ago. She states that she has had nausea since early February with intermittent vomiting. Over the last two weeks her nausea and vomiting worsened, with daily vomiting episodes. Over the last few days she has not been able to tolerate anything PO. On her way to the hospital her family states that she lost consciousness for about 20 sec, while sitting in the car. She endorses weakness and chills. She also endorses right sided pain from around the iliac crest to the mid chest, dull, worse with movement. She denies any fever, abdominal pain, vb, vaginal discharge, chest pain, sob, dysuria. She has not seen an OBGYN this pregnancy.    Ob/Gyn History:                   LMP - 23             Endorses h/o ovarian cysts Denies history of uterine fibroids, abnormal paps, or STIs    Denies the following: constitutional symptoms, visual symptoms, cardiovascular symptoms, respiratory symptoms, GI symptoms, musculoskeletal symptoms, skin symptoms, neurologic symptoms, hematologic symptoms, allergic symptoms, psychiatric symptoms  Except any pertinent positives listed.     Home Medications:  Allergies  No Known Allergies  Intolerances    PAST MEDICAL & SURGICAL HISTORY:  No pertinent past medical history  Appendectomy  FAMILY HISTORY:  SOCIAL HISTORY: Denies cigarette use, alcohol use, or illicit drug use  Vital Signs Last 24 Hrs  T(F): 99.7 (2023 02:40), Max: 99.7 (2023 02:40)  HR: 86 (2023 02:42) (86 - 86)  BP: 116/66 (2023 02:42) (116/66 - 116/66)  RR: 16 (2023 02:42) (16 - 16)  Height (cm): 152.4 (23 @ 02:42)      General Appearance - AAOx3, NAD  Abdomen - Soft, nontender, nondistended, no rebound, no rigidity, no guarding, bowel sounds present  Back: Mild right sided tenderness on palpation, no CVA tenderness Bilaterally    Meds:   (ADM OVERRIDE) 1 each &lt;see task&gt; GiveOnce  lactated ringers Bolus 1000 milliLiter(s) IV Bolus once    Height (cm): 152.4 (23 @ 02:42)    LABS:                        13.5   11.56 )-----------( 297      ( 2023 02:40 )             39.1             137  |  103  |  6<L>  ----------------------------<  91  3.8   |  20  |  0.6<L>    Ca    9.9      2023 02:40    TPro  7.3  /  Alb  4.7  /  TBili  0.6  /  DBili  x   /  AST  37  /  ALT  82<H>  /  AlkPhos  91        Urinalysis Basic - ( 2023 04:10 )    Color: Light Yellow / Appearance: Slightly Turbid / S.007 / pH: x  Gluc: x / Ketone: Large  / Bili: Negative / Urobili: <2 mg/dL   Blood: x / Protein: Negative / Nitrite: Negative   Leuk Esterase: Negative / RBC: x / WBC x   Sq Epi: x / Non Sq Epi: x / Bacteria: x          RADIOLOGY & ADDITIONAL STUDIES:  < from: US OB Fetus Limited (23 @ 03:27) >    ACC: [id number] EXAM:  US OB LTD FETUS(ES)   ORDERED BY: SARAI GONZALEZ     PROCEDURE DATE:  2023          INTERPRETATION:  CLINICAL INFORMATION: Abdominal pain. Pregnancy.   Evaluate for ectopic pregnancy.    LMP: 2023    COMPARISON: Pelvic ultrasound 3/29/2023.    Transabdominal pelvic sonogram only. Color and Spectral Doppler was   performed.    FINDINGS:  Uterus: 8.9 x 5.7 x 6.6 cm. Single intrauterine pregnancy is visualized   with a crown-rump length of 1.35 cm, corresponding to a gestational age   of 7 weeks and 4 days. Fetal heart rate 175 bpm. Subchorionic hemorrhage   measures 1.7 x 1.8 x 0.9 cm, slightly increased in size since the   previous exam (1.5 x 1 x 0.7 cm).    Right ovary: 3.3 x 1.9 x 2.5 cm. Corpus luteal cyst measures 2.1 x 1.4 x   1.9 cm Doppler flow is demonstrated to the right ovary.  Left ovary: Not visualized.    Fluid: None.    IMPRESSION:  Since 3/29/2023:    Single live intrauterine pregnancy with estimated gestational age of 7   weeks and 4 days. Fetal heart rate 175 bpm.    No significant change in subchorionic hemorrhage measuring up to 1.7 cm,   previously up to 1.5 cm.    Right ovarian corpus luteal cyst measuring up to 2.1 cm. The left ovary   is not visualized.    No sonographic evidence of ectopic pregnancy.    --- End of Report ---          EL PRADO MD; Resident Radiologist  This document has been electronically signed.  VINH VALERO MD; Attending Radiologist  This document has been electronically signed. 2023  3:42AM    < end of copied text >

## 2023-04-14 NOTE — CONSULT NOTE ADULT - ASSESSMENT
ASSESSMENT:  27yF w/o any significant PMH  7 weeks since LMP, s/p acute appendectomy 1 week ago that comes in with chief complaint of flank pain.  RLQ US performed, some post surgical inflammatory changes noted. Nausea and vomit is most likely related to pregnancy, UA mildly positive with hydronephrosis noted in the RUQ US. Physical exam findings, imaging, and labs as documented above.     PLAN:  -no acute surgical intervention  -IV hydration  -PO trial   -Antiemetics  -if patient is admitted, will follow up       Above plan discussed with Attending Surgeon Dr. Smart, family, and Primary team  23 @ 09:23    CONSULT SPECTRA: 0924

## 2023-04-14 NOTE — ED PROVIDER NOTE - CLINICAL SUMMARY MEDICAL DECISION MAKING FREE TEXT BOX
ulisesk: received sign out from Dr. Bullock. pt 7 weeks pregnant, abd pain. recent appendectpmy. mild ttp R abd, no peritontitis. skin cdi. UA +, clincially pyelo. IV abx given. ?hydronephrosis on R. admitted to OBGYN for further management and monitoring.

## 2023-04-14 NOTE — H&P ADULT - HISTORY OF PRESENT ILLNESS
28 yo  @ 7w0d by LMP (23) presents to the ED with nausea and vomiting, s/p appendectomy 2 weeks ago. She states that she has had nausea since early February with intermittent vomiting. Over the last two weeks her nausea and vomiting worsened, with daily vomiting episodes. Over the last few days she has not been able to tolerate anything PO. On her way to the hospital her family states that she lost consciousness for about 20 sec, while sitting in the car. She endorses weakness and chills. She also endorses right sided pain from around the iliac crest to the mid chest, dull, worse with movement. She denies any fever, abdominal pain, vb, vaginal discharge, chest pain, sob, dysuria. She has not seen an OBGYN this pregnancy.      Ob/Gyn History:                   LMP - 23             Endorses h/o ovarian cysts Denies history of uterine fibroids, abnormal paps, or STIs     26 yo  @ 7w0d by LMP (23) presents to the ED with nausea and vomiting s/p appendectomy 2 weeks ago. She states that she has had nausea since early February with intermittent vomiting. Over the last two weeks her nausea and vomiting worsened, with daily vomiting episodes. Over the last few days she has not been able to tolerate anything PO. On her way to the hospital her family states that she lost consciousness for about 20 sec, while sitting in the car. She endorses weakness and chills. She also endorses right sided pain from around the iliac crest to the mid chest, dull, worse with movement. She denies any fever, abdominal pain, vb, vaginal discharge, chest pain, sob, dysuria. She has not seen an OBGYN this pregnancy.      Ob/Gyn History:                   LMP - 23             Endorses h/o ovarian cysts, Denies history of uterine fibroids, abnormal paps, or STIs

## 2023-04-14 NOTE — CONSULT NOTE ADULT - ATTENDING COMMENTS
This is 28 y/o female w/o any significant PMH,   7 weeks since LMP, s/p laparoscopic appendectomy 1 week ago at outside hospital that comes in with chief complaint of mild right flank pain. Patient reports the pain has been present for about 4 days, localized in the right flank that has been worsening since. Pain is accompanied with nausea and vomit, the patient reports the nausea and vomiting has been present since the begging on the month and prior to her appendectomy. Of note patient is s/p lap appendectomy 1 week ago, no complications post operative. Patient followed with surgeon this Tuesday and was cleared post operative.    PE:  AAO x4  Chest: clear.  CV: rrr  Abdomen: soft, nontender, mild right flank pain to palpation.    WBC 11  Abd. US shows no fluid collection in RLQ or pelvis    ASSESSMENT:  28 y/o female with 7 weeks pregnancy.  Nonspecific Right Flank Pain.  Nausea.    PLAN:  No evidence of any postop complications.  I recommend:  - iv hydration  - antiemetics prn  - regular diet  - Ob/Gyn f/u

## 2023-04-14 NOTE — CONSULT NOTE ADULT - SUBJECTIVE AND OBJECTIVE BOX
GENERAL SURGERY CONSULT NOTE    Patient: MINI KINNEY , 27y ([birth year])Female   MRN: [id number]  Location: [facility]  Visit: 23 Emergency  Date: 23 @ 09:23    HPI:  27yF w/o any significant PMH  7 weeks since LMP, s/p acute appendectomy 1 week ago that comes in with chief complaint of flank pain. Patient reports the pain has been present for about 4 days, localized in the right flank that has been worsening since. Pain is accompanied with nausea and vomit, the patient reports the nausea and vomiting has been present since the begging on the month. Of note patient is s/p lap appendectomy 1 week ago, no complications post operative. Patient followed with surgeon this Tuesday and was cleared post operative.    PAST MEDICAL & SURGICAL HISTORY:  No pertinent past medical history          Home Medications:        VITALS:  T(F): 97.8 (23 @ 06:16), Max: 99.7 (23 @ 02:40)  HR: 80 (23 @ 06:16) (80 - 88)  BP: 100/76 (23 @ 06:16) (100/76 - 116/66)  RR: 18 (23 @ 06:16) (16 - 18)  SpO2: 99% (23 @ 06:16) (97% - 99%)    PHYSICAL EXAM:  General: NAD, AAOx3, calm and cooperative  Cardiac: RRR  Respiratory: CTAB, normal respiratory effort  Abdomen: Soft, non-distended, mild RUQ tenderness, post surgical wounds clean  Skin: Warm/dry, normal color, no jaundice  Incision/wound: healing well, clean, dry and intact    MEDICATIONS  (STANDING):  lactated ringers. 1000 milliLiter(s) (100 mL/Hr) IV Continuous <Continuous>    MEDICATIONS  (PRN):      LAB/STUDIES:                        13.5   11.56 )-----------( 297      ( 2023 02:40 )             39.1         137  |  103  |  6<L>  ----------------------------<  91  3.8   |  20  |  0.6<L>    Ca    9.9      2023 02:40    TPro  7.3  /  Alb  4.7  /  TBili  0.6  /  DBili  x   /  AST  37  /  ALT  82<H>  /  AlkPhos  91  04-14      LIVER FUNCTIONS - ( 2023 02:40 )  Alb: 4.7 g/dL / Pro: 7.3 g/dL / ALK PHOS: 91 U/L / ALT: 82 U/L / AST: 37 U/L / GGT: x           Urinalysis Basic - ( 2023 04:10 )    Color: Light Yellow / Appearance: Slightly Turbid / S.007 / pH: x  Gluc: x / Ketone: Large  / Bili: Negative / Urobili: <2 mg/dL   Blood: x / Protein: Negative / Nitrite: Negative   Leuk Esterase: Negative / RBC: 2 /HPF / WBC 10 /HPF   Sq Epi: x / Non Sq Epi: x / Bacteria: Moderate      CARDIAC MARKERS ( 2023 02:40 )  x     / <0.01 ng/mL / x     / x     / x                  IMAGING:      ACCESS DEVICES:  [ x] Peripheral IV  [ ] Central Venous Line	[ ] R	[ ] L	[ ] IJ	[ ] Fem	[ ] SC	Placed:   [ ] Arterial Line		[ ] R	[ ] L	[ ] Fem	[ ] Rad	[ ] Ax	Placed:   [ ] PICC:					[ ] Mediport  [ ] Urinary Catheter, Date Placed:        GENERAL SURGERY CONSULT NOTE    Patient: MINI KINNEY , 27y (89)Female   MRN: [id number]  Location: [facility] ED  Visit: 23 Emergency  Date: 23 @ 09:23    HPI:  27yF w/o any significant PMH  7 weeks since LMP, s/p laparoscopic appendectomy 1 week ago at outside hospital that comes in with chief complaint of mild right flank pain. Patient reports the pain has been present for about 4 days, localized in the right flank that has been worsening since. Pain is accompanied with nausea and vomit, the patient reports the nausea and vomiting has been present since the begging on the month and prior to her appendectomy. Of note patient is s/p lap appendectomy 1 week ago, no complications post operative. Patient followed with surgeon this Tuesday and was cleared post operative.    PAST MEDICAL & SURGICAL HISTORY:  No pertinent past medical history          Home Medications:        VITALS:  T(F): 97.8 (23 @ 06:16), Max: 99.7 (23 @ 02:40)  HR: 80 (23 @ 06:16) (80 - 88)  BP: 100/76 (23 @ 06:16) (100/76 - 116/66)  RR: 18 (23 @ 06:16) (16 - 18)  SpO2: 99% (23 @ 06:16) (97% - 99%)    PHYSICAL EXAM:  General: NAD, AAOx3, calm and cooperative  Cardiac: RRR  Respiratory: CTAB, normal respiratory effort  Abdomen: Soft, non-distended, mild RUQ tenderness, post surgical wounds clean  Skin: Warm/dry, normal color, no jaundice  Incision/wound: healing well, clean, dry and intact    MEDICATIONS  (STANDING):  lactated ringers. 1000 milliLiter(s) (100 mL/Hr) IV Continuous <Continuous>    MEDICATIONS  (PRN):      LAB/STUDIES:                        13.5   11.56 )-----------( 297      ( 2023 02:40 )             39.1     04-14    137  |  103  |  6<L>  ----------------------------<  91  3.8   |  20  |  0.6<L>    Ca    9.9      2023 02:40    TPro  7.3  /  Alb  4.7  /  TBili  0.6  /  DBili  x   /  AST  37  /  ALT  82<H>  /  AlkPhos  91  04-14      LIVER FUNCTIONS - ( 2023 02:40 )  Alb: 4.7 g/dL / Pro: 7.3 g/dL / ALK PHOS: 91 U/L / ALT: 82 U/L / AST: 37 U/L / GGT: x           Urinalysis Basic - ( 2023 04:10 )    Color: Light Yellow / Appearance: Slightly Turbid / S.007 / pH: x  Gluc: x / Ketone: Large  / Bili: Negative / Urobili: <2 mg/dL   Blood: x / Protein: Negative / Nitrite: Negative   Leuk Esterase: Negative / RBC: 2 /HPF / WBC 10 /HPF   Sq Epi: x / Non Sq Epi: x / Bacteria: Moderate      CARDIAC MARKERS ( 2023 02:40 )  x     / <0.01 ng/mL / x     / x     / x                  IMAGING:      ACCESS DEVICES:  [ x] Peripheral IV  [ ] Central Venous Line	[ ] R	[ ] L	[ ] IJ	[ ] Fem	[ ] SC	Placed:   [ ] Arterial Line		[ ] R	[ ] L	[ ] Fem	[ ] Rad	[ ] Ax	Placed:   [ ] PICC:					[ ] Mediport  [ ] Urinary Catheter, Date Placed:

## 2023-04-14 NOTE — ED ADULT TRIAGE NOTE - CHIEF COMPLAINT QUOTE
Pt came c/o not eating or drinking for unknown amount of days, as per nephew, pt is 1 months pregnant and s/p appendectomy, was c/o lower abdominal pain, family came to take her to ER and pt had a syncopal episode in the car.

## 2023-04-14 NOTE — ED PROVIDER NOTE - PROGRESS NOTE DETAILS
MELISSA-- pt appears mildly uncomfortable in no acute distress, speaking in complete sentences MELISSA-- pt appears mildly uncomfortable in no acute distress, speaking in complete sentences, MELISSA-- pt nauseous, zofran given MELISSA-- pt appears mildly uncomfortable in no acute distress, speaking in complete sentences, ambulatory to bathroom MELISSA-- pt nauseous, zofran given, going for CT scan MELISSA-- pt reports nausea improved after zofran, but now complaining of headache and RLQ pain. Abd still soft, nontender. NZ: Patient signed out to me by Dr. Angelo.  27-year-old female, 7 weeks pregnant, persistent right lower quadrant abdominal pain s/p imaging here on previous visit showing ovarian cyst, and exploratory laparoscopy with appendectomy performed in the [place] since last visit, with healing surgical sites noted today, presenting with difficulty tolerating PO and vomiting.  Patient is afebrile, stable vital signs, had presented after an episode of altered mental status lasting short period with return to normal mental status, no automaticity or prolonged return to normal mental status - immediate A&O3 reported after event, likely syncopal due to hypovolemia/vasovagal. Now receiving fluids.  Labs normal range to this point.  CT head negative for acute intracranial pathology.  Pending surgical evaluation and reassessment. Resident AO: Received a signout from Dr. Flower and Dr. Juarez, pending surgery consult, reassessment and disposition.  On exam, patient awake, alert, oriented x3, sickly appearing, but not in acute distress.  Mucous membranes moist, lungs clear, abdomen soft, mildly tender to the right flank, no CVA tenderness. UA with large ketones, negative LE/nitrites, but 10 WBCs and moderate bacteria - abx given by previous team. All results discussed with patient, and with nephew, sister, and brother at bedside.   Surgery now at bedside. Resident AO: Surgery evaluated the patient (spoke with Dr. Chavarria), and attending Dr. Smart requests RLQ US to evaluate for post-op complications. POCUS with mild right-sided hydronephrosis - will update Ob team in regards to pyelo in pregnancy.

## 2023-04-14 NOTE — ED PROVIDER NOTE - OBJECTIVE STATEMENT
27 y f, pmh of appendectomy, 1 month gestation, pw ams. Per spouse, pt felt nauseous in the car, +decreased appetitite for past few days due to nausea, +loc in the car lasting for 20 seconds, no whole body shaking/tongue biting/incontinence. In the ed, pt remain altered but opens her eyes. 27 y f, pmh of appendectomy, 1 month gestation, pw ams. Per nephew, pt felt nauseous in the car, +decreased appetitite for past few days due to nausea, +loc in the car lasting for 20 seconds, no whole body shaking/tongue biting/incontinence. In the ed, pt remain altered but opens her eyes. 27 y f, pmh of appendectomy, 1 month gestation, pw ams. Per nephew, pt felt nauseous in the car, +decreased appetitite for past few days due to nausea, +loc in the car lasting for 20 seconds, no whole body shaking/tongue biting/incontinence. In the ed, pt remain altered but opens her eyes.    See progress notes below for further history/progression of ED course.

## 2023-04-14 NOTE — ED PROVIDER NOTE - PHYSICAL EXAMINATION
CONSTITUTIONAL: NAD  SKIN: Warm dry  HEAD: NCAT  EYES: NL inspection  ENT: MMM  NECK: Supple; non tender.  CARD: RRR  RESP: CTAB  ABD: S/NT no R/G, fetal pole seen in ultrasound  EXT: no pedal edema  NEURO: Grossly unremarkable  PSYCH: Cooperative, appropriate. CONSTITUTIONAL: NAD  SKIN: Warm dry  HEAD: NCAT  EYES: NL inspection  ENT: MMM  NECK: Supple; non tender.  CARD: RRR  RESP: CTAB  ABD: + 2cm well healed surgical scar LLQ, S/NT no R/G, fetal pole seen in ultrasound  EXT: no pedal edema  NEURO: Grossly unremarkable  PSYCH: Cooperative, appropriate.

## 2023-04-14 NOTE — CONSULT NOTE ADULT - ASSESSMENT
27y  @ 7w, nausea and vomiting, r/o hyperemesis gravidum, currently clinically and hemodynamically stable    -Recommend Zofran [id number] and Rectal Compazine q4 hours  -IVF  -PO trial post medication  -Recommend General Surgery consult considering proximity of appendectomy   -B-hydroxy butyrate level   -Will reasses    Dr. Bedoya and Dr. Forrest aware 27y  @ 7w, nausea and vomiting, r/o hyperemesis gravidum, currently clinically and hemodynamically stable    -Recommend Zofran h0bropc and Rectal Compazine q4 hours  -IVF  -PO trial post medication  -Recommend General Surgery consult considering proximity of appendectomy   -B-hydroxy butyrate level and UDS  -Will reasses    Dr. Bedoya and Dr. Forrest aware

## 2023-04-15 LAB
ALBUMIN SERPL ELPH-MCNC: 3.9 G/DL — SIGNIFICANT CHANGE UP (ref 3.5–5.2)
ALP SERPL-CCNC: 71 U/L — SIGNIFICANT CHANGE UP (ref 30–115)
ALT FLD-CCNC: 81 U/L — HIGH (ref 0–41)
ANION GAP SERPL CALC-SCNC: 14 MMOL/L — SIGNIFICANT CHANGE UP (ref 7–14)
AST SERPL-CCNC: 59 U/L — HIGH (ref 0–41)
B-OH-BUTYR SERPL-SCNC: 0.4 MMOL/L — SIGNIFICANT CHANGE UP
BASOPHILS # BLD AUTO: 0.04 K/UL — SIGNIFICANT CHANGE UP (ref 0–0.2)
BASOPHILS NFR BLD AUTO: 0.6 % — SIGNIFICANT CHANGE UP (ref 0–1)
BILIRUB SERPL-MCNC: 0.8 MG/DL — SIGNIFICANT CHANGE UP (ref 0.2–1.2)
BUN SERPL-MCNC: 4 MG/DL — LOW (ref 10–20)
CALCIUM SERPL-MCNC: 8.7 MG/DL — SIGNIFICANT CHANGE UP (ref 8.4–10.5)
CHLORIDE SERPL-SCNC: 105 MMOL/L — SIGNIFICANT CHANGE UP (ref 98–110)
CO2 SERPL-SCNC: 18 MMOL/L — SIGNIFICANT CHANGE UP (ref 17–32)
CREAT SERPL-MCNC: 0.5 MG/DL — LOW (ref 0.7–1.5)
CULTURE RESULTS: SIGNIFICANT CHANGE UP
EGFR: 131 ML/MIN/1.73M2 — SIGNIFICANT CHANGE UP
EOSINOPHIL # BLD AUTO: 0.12 K/UL — SIGNIFICANT CHANGE UP (ref 0–0.7)
EOSINOPHIL NFR BLD AUTO: 1.9 % — SIGNIFICANT CHANGE UP (ref 0–8)
GLUCOSE SERPL-MCNC: 106 MG/DL — HIGH (ref 70–99)
HCT VFR BLD CALC: 33.1 % — LOW (ref 37–47)
HGB BLD-MCNC: 11.5 G/DL — LOW (ref 12–16)
IMM GRANULOCYTES NFR BLD AUTO: 0.2 % — SIGNIFICANT CHANGE UP (ref 0.1–0.3)
LYMPHOCYTES # BLD AUTO: 1.87 K/UL — SIGNIFICANT CHANGE UP (ref 1.2–3.4)
LYMPHOCYTES # BLD AUTO: 30 % — SIGNIFICANT CHANGE UP (ref 20.5–51.1)
MAGNESIUM SERPL-MCNC: 1.8 MG/DL — SIGNIFICANT CHANGE UP (ref 1.8–2.4)
MCHC RBC-ENTMCNC: 31.3 PG — HIGH (ref 27–31)
MCHC RBC-ENTMCNC: 34.7 G/DL — SIGNIFICANT CHANGE UP (ref 32–37)
MCV RBC AUTO: 89.9 FL — SIGNIFICANT CHANGE UP (ref 81–99)
MONOCYTES # BLD AUTO: 0.39 K/UL — SIGNIFICANT CHANGE UP (ref 0.1–0.6)
MONOCYTES NFR BLD AUTO: 6.3 % — SIGNIFICANT CHANGE UP (ref 1.7–9.3)
NEUTROPHILS # BLD AUTO: 3.8 K/UL — SIGNIFICANT CHANGE UP (ref 1.4–6.5)
NEUTROPHILS NFR BLD AUTO: 61 % — SIGNIFICANT CHANGE UP (ref 42.2–75.2)
NRBC # BLD: 0 /100 WBCS — SIGNIFICANT CHANGE UP (ref 0–0)
PHOSPHATE SERPL-MCNC: 3 MG/DL — SIGNIFICANT CHANGE UP (ref 2.1–4.9)
PLATELET # BLD AUTO: 254 K/UL — SIGNIFICANT CHANGE UP (ref 130–400)
PMV BLD: 11.3 FL — HIGH (ref 7.4–10.4)
POTASSIUM SERPL-MCNC: 3.4 MMOL/L — LOW (ref 3.5–5)
POTASSIUM SERPL-SCNC: 3.4 MMOL/L — LOW (ref 3.5–5)
PROT SERPL-MCNC: 5.8 G/DL — LOW (ref 6–8)
RBC # BLD: 3.68 M/UL — LOW (ref 4.2–5.4)
RBC # FLD: 13 % — SIGNIFICANT CHANGE UP (ref 11.5–14.5)
SODIUM SERPL-SCNC: 137 MMOL/L — SIGNIFICANT CHANGE UP (ref 135–146)
SPECIMEN SOURCE: SIGNIFICANT CHANGE UP
WBC # BLD: 6.23 K/UL — SIGNIFICANT CHANGE UP (ref 4.8–10.8)
WBC # FLD AUTO: 6.23 K/UL — SIGNIFICANT CHANGE UP (ref 4.8–10.8)

## 2023-04-15 PROCEDURE — 99232 SBSQ HOSP IP/OBS MODERATE 35: CPT

## 2023-04-15 RX ADMIN — Medication 25 MILLIGRAM(S): at 05:58

## 2023-04-15 RX ADMIN — ONDANSETRON 4 MILLIGRAM(S): 8 TABLET, FILM COATED ORAL at 23:32

## 2023-04-15 RX ADMIN — FAMOTIDINE 100 MILLIGRAM(S): 10 INJECTION INTRAVENOUS at 17:17

## 2023-04-15 RX ADMIN — Medication 25 MILLIGRAM(S): at 17:18

## 2023-04-15 RX ADMIN — Medication 1 GRAM(S): at 05:59

## 2023-04-15 RX ADMIN — ONDANSETRON 4 MILLIGRAM(S): 8 TABLET, FILM COATED ORAL at 17:18

## 2023-04-15 RX ADMIN — FAMOTIDINE 100 MILLIGRAM(S): 10 INJECTION INTRAVENOUS at 05:55

## 2023-04-15 RX ADMIN — CEFTRIAXONE 100 MILLIGRAM(S): 500 INJECTION, POWDER, FOR SOLUTION INTRAMUSCULAR; INTRAVENOUS at 11:41

## 2023-04-15 NOTE — PROGRESS NOTE ADULT - ASSESSMENT
27y  @7w1d, with suspected hyperemesis gravidum, admitted for inpatient management, currently doing well.    -monitor vitals  -multivitamin bag in AM with LR for maintenance  -continue , zofran, compazine, sucralfate, pepcid   -AM labs ordered  -regular diet as tolerated  -f/u UDS, Ucx    Dr. Lopez to be made aware 27y  @7w1d, with suspected hyperemesis gravidum, admitted for inpatient management, currently doing well.    -monitor vitals  -multivitamin bag in AM with LR for maintenance  -continue , zofran, compazine, sucralfate, pepcid   -AM labs ordered  -advance diet as tolerated  -f/u UDS, Ucx    Dr. Lopez to be made aware

## 2023-04-15 NOTE — PROGRESS NOTE ADULT - ASSESSMENT
27yF w/o any significant PMH  7 weeks since LMP, s/p acute appendectomy 1 week ago that comes in with chief complaint of flank pain.  RLQ US performed, some post surgical inflammatory changes noted. Nausea and vomit is most likely related to pregnancy, UA mildly positive with hydronephrosis noted in the RUQ US. Physical exam findings, imaging, and labs as documented above.     PLAN:  -no acute surgical intervention  -IV hydration  -PO trial   -Antiemetics  -Regular diet  -Recall surgery as needed

## 2023-04-16 ENCOUNTER — TRANSCRIPTION ENCOUNTER (OUTPATIENT)
Age: 28
End: 2023-04-16

## 2023-04-16 VITALS — HEART RATE: 78 BPM | TEMPERATURE: 98 F | SYSTOLIC BLOOD PRESSURE: 99 MMHG | DIASTOLIC BLOOD PRESSURE: 62 MMHG

## 2023-04-16 LAB
ANION GAP SERPL CALC-SCNC: 13 MMOL/L — SIGNIFICANT CHANGE UP (ref 7–14)
BASOPHILS # BLD AUTO: 0.06 K/UL — SIGNIFICANT CHANGE UP (ref 0–0.2)
BASOPHILS NFR BLD AUTO: 0.7 % — SIGNIFICANT CHANGE UP (ref 0–1)
BLD GP AB SCN SERPL QL: SIGNIFICANT CHANGE UP
BUN SERPL-MCNC: 4 MG/DL — LOW (ref 10–20)
CALCIUM SERPL-MCNC: 9.3 MG/DL — SIGNIFICANT CHANGE UP (ref 8.4–10.5)
CHLORIDE SERPL-SCNC: 106 MMOL/L — SIGNIFICANT CHANGE UP (ref 98–110)
CO2 SERPL-SCNC: 19 MMOL/L — SIGNIFICANT CHANGE UP (ref 17–32)
CREAT SERPL-MCNC: 0.5 MG/DL — LOW (ref 0.7–1.5)
EGFR: 131 ML/MIN/1.73M2 — SIGNIFICANT CHANGE UP
EOSINOPHIL # BLD AUTO: 0.17 K/UL — SIGNIFICANT CHANGE UP (ref 0–0.7)
EOSINOPHIL NFR BLD AUTO: 2.1 % — SIGNIFICANT CHANGE UP (ref 0–8)
GLUCOSE SERPL-MCNC: 80 MG/DL — SIGNIFICANT CHANGE UP (ref 70–99)
HCT VFR BLD CALC: 35.8 % — LOW (ref 37–47)
HGB BLD-MCNC: 12.1 G/DL — SIGNIFICANT CHANGE UP (ref 12–16)
IMM GRANULOCYTES NFR BLD AUTO: 0.5 % — HIGH (ref 0.1–0.3)
LYMPHOCYTES # BLD AUTO: 2.78 K/UL — SIGNIFICANT CHANGE UP (ref 1.2–3.4)
LYMPHOCYTES # BLD AUTO: 34 % — SIGNIFICANT CHANGE UP (ref 20.5–51.1)
MAGNESIUM SERPL-MCNC: 1.9 MG/DL — SIGNIFICANT CHANGE UP (ref 1.8–2.4)
MCHC RBC-ENTMCNC: 30.2 PG — SIGNIFICANT CHANGE UP (ref 27–31)
MCHC RBC-ENTMCNC: 33.8 G/DL — SIGNIFICANT CHANGE UP (ref 32–37)
MCV RBC AUTO: 89.3 FL — SIGNIFICANT CHANGE UP (ref 81–99)
MONOCYTES # BLD AUTO: 0.6 K/UL — SIGNIFICANT CHANGE UP (ref 0.1–0.6)
MONOCYTES NFR BLD AUTO: 7.3 % — SIGNIFICANT CHANGE UP (ref 1.7–9.3)
NEUTROPHILS # BLD AUTO: 4.52 K/UL — SIGNIFICANT CHANGE UP (ref 1.4–6.5)
NEUTROPHILS NFR BLD AUTO: 55.4 % — SIGNIFICANT CHANGE UP (ref 42.2–75.2)
NRBC # BLD: 0 /100 WBCS — SIGNIFICANT CHANGE UP (ref 0–0)
PHOSPHATE SERPL-MCNC: 3.9 MG/DL — SIGNIFICANT CHANGE UP (ref 2.1–4.9)
PLATELET # BLD AUTO: 285 K/UL — SIGNIFICANT CHANGE UP (ref 130–400)
PMV BLD: 11.2 FL — HIGH (ref 7.4–10.4)
POTASSIUM SERPL-MCNC: 3.5 MMOL/L — SIGNIFICANT CHANGE UP (ref 3.5–5)
POTASSIUM SERPL-SCNC: 3.5 MMOL/L — SIGNIFICANT CHANGE UP (ref 3.5–5)
RBC # BLD: 4.01 M/UL — LOW (ref 4.2–5.4)
RBC # FLD: 12.9 % — SIGNIFICANT CHANGE UP (ref 11.5–14.5)
SODIUM SERPL-SCNC: 138 MMOL/L — SIGNIFICANT CHANGE UP (ref 135–146)
WBC # BLD: 8.17 K/UL — SIGNIFICANT CHANGE UP (ref 4.8–10.8)
WBC # FLD AUTO: 8.17 K/UL — SIGNIFICANT CHANGE UP (ref 4.8–10.8)

## 2023-04-16 PROCEDURE — 99238 HOSP IP/OBS DSCHRG MGMT 30/<: CPT

## 2023-04-16 RX ORDER — PROCHLORPERAZINE MALEATE 5 MG
1 TABLET ORAL
Refills: 0
Start: 2023-04-16

## 2023-04-16 RX ORDER — ONDANSETRON 8 MG/1
1 TABLET, FILM COATED ORAL
Qty: 6 | Refills: 0
Start: 2023-04-16 | End: 2023-04-17

## 2023-04-16 RX ORDER — ONDANSETRON 8 MG/1
1 TABLET, FILM COATED ORAL
Qty: 30 | Refills: 0
Start: 2023-04-16 | End: 2023-05-15

## 2023-04-16 RX ORDER — FAMOTIDINE 10 MG/ML
2 INJECTION INTRAVENOUS
Refills: 0
Start: 2023-04-16

## 2023-04-16 RX ORDER — ONDANSETRON 8 MG/1
0 TABLET, FILM COATED ORAL
Qty: 30 | Refills: 0
Start: 2023-04-16

## 2023-04-16 RX ORDER — PYRIDOXINE HCL (VITAMIN B6) 100 MG
1 TABLET ORAL
Qty: 60 | Refills: 0
Start: 2023-04-16 | End: 2023-05-15

## 2023-04-16 RX ORDER — PYRIDOXINE HCL (VITAMIN B6) 100 MG
1 TABLET ORAL
Refills: 0
Start: 2023-04-16

## 2023-04-16 RX ORDER — PROCHLORPERAZINE MALEATE 5 MG
1 TABLET ORAL
Qty: 0 | Refills: 0 | DISCHARGE
Start: 2023-04-16

## 2023-04-16 RX ORDER — SUCRALFATE 1 G
1 TABLET ORAL
Qty: 60 | Refills: 0
Start: 2023-04-16 | End: 2023-04-30

## 2023-04-16 RX ORDER — FAMOTIDINE 10 MG/ML
2 INJECTION INTRAVENOUS
Qty: 0 | Refills: 0 | DISCHARGE
Start: 2023-04-16

## 2023-04-16 RX ORDER — PANTOPRAZOLE SODIUM 20 MG/1
1 TABLET, DELAYED RELEASE ORAL
Qty: 14 | Refills: 0
Start: 2023-04-16 | End: 2023-04-29

## 2023-04-16 RX ORDER — PYRIDOXINE HCL (VITAMIN B6) 100 MG
1 TABLET ORAL
Qty: 0 | Refills: 0 | DISCHARGE
Start: 2023-04-16

## 2023-04-16 RX ORDER — PROCHLORPERAZINE MALEATE 5 MG
1 TABLET ORAL
Qty: 60 | Refills: 0
Start: 2023-04-16 | End: 2023-05-15

## 2023-04-16 RX ADMIN — Medication 25 MILLIGRAM(S): at 06:45

## 2023-04-16 RX ADMIN — Medication 1 GRAM(S): at 06:45

## 2023-04-16 RX ADMIN — ONDANSETRON 4 MILLIGRAM(S): 8 TABLET, FILM COATED ORAL at 11:09

## 2023-04-16 RX ADMIN — FAMOTIDINE 100 MILLIGRAM(S): 10 INJECTION INTRAVENOUS at 06:45

## 2023-04-16 RX ADMIN — SODIUM CHLORIDE 125 MILLILITER(S): 9 INJECTION, SOLUTION INTRAVENOUS at 08:58

## 2023-04-16 RX ADMIN — CEFTRIAXONE 100 MILLIGRAM(S): 500 INJECTION, POWDER, FOR SOLUTION INTRAMUSCULAR; INTRAVENOUS at 11:09

## 2023-04-16 RX ADMIN — ONDANSETRON 4 MILLIGRAM(S): 8 TABLET, FILM COATED ORAL at 06:45

## 2023-04-16 RX ADMIN — Medication 25 MILLIGRAM(S): at 06:51

## 2023-04-16 NOTE — DISCHARGE NOTE PROVIDER - NSDCCPCAREPLAN_GEN_ALL_CORE_FT
PRINCIPAL DISCHARGE DIAGNOSIS  Diagnosis: Hyperemesis gravidarum  Assessment and Plan of Treatment:       SECONDARY DISCHARGE DIAGNOSES  Diagnosis: Status post appendectomy  Assessment and Plan of Treatment:     Diagnosis: Hyperemesis gravidarum  Assessment and Plan of Treatment:

## 2023-04-16 NOTE — PROGRESS NOTE ADULT - ASSESSMENT
27y  @7w2d, with suspected hyperemesis gravidum, admitted for inpatient management, currently doing well.    -monitor vitals  -continue regular diet  -continue IVF hydration  -continue , zofran, compazine, sucralfate, pepcid   -AM labs ordered    Dr. Dev pendleton

## 2023-04-16 NOTE — DISCHARGE NOTE PROVIDER - NSDCMRMEDTOKEN_GEN_ALL_CORE_FT
famotidine 10 mg/mL intravenous solution: 2 milliliter(s) intravenous 2 times a day  ondansetron 4 mg oral tablet, disintegratin tab(s) orally every 8 hours as needed for nausea  prochlorperazine 25 mg rectal suppository: 1 suppository(ies) rectal every 12 hours  Protonix 40 mg oral granule, delayed release: 1 each orally once a day   pyridoxine 25 mg oral tablet: 1 tab(s) orally 2 times a day

## 2023-04-16 NOTE — DISCHARGE NOTE NURSING/CASE MANAGEMENT/SOCIAL WORK - PATIENT PORTAL LINK FT
You can access the FollowMyHealth Patient Portal offered by [facility] by registering at the following website: [email or web address]. By joining [employer]’s FollowMyHealth portal, you will also be able to view your health information using other applications (apps) compatible with our system.

## 2023-04-16 NOTE — DISCHARGE NOTE NURSING/CASE MANAGEMENT/SOCIAL WORK - NSDCPEFALRISK_GEN_ALL_CORE
For information on Fall & Injury Prevention, visit: [email or web address] OR  [email or web address] OR  https://www.cdc.gov/steadi/patient.html

## 2023-04-16 NOTE — PROGRESS NOTE ADULT - ATTENDING COMMENTS
28 y/o  @ 7w2d with hyperemesis gravidarum, improved, tolerating diet. Will discharge home today with follow up at [facility]. Precautions discussed. Medications sent to pharmacy.
7 wks with hyperemesis  labs/vs WNL  symptoms improving, continue medications, will advance diet as tolerated

## 2023-04-16 NOTE — PROGRESS NOTE ADULT - SUBJECTIVE AND OBJECTIVE BOX
Reason for Admission: hyperemesis gravidarum    Gestational Age: 7w2d  Hospital Day: 3    HPI: Patient seen and examined at bedside, resting comfortably. Patient tolerated 3 meals yesterday without vomiting. Denies fever, chills, vomiting, severe abdominal pain, dysuria. Denies chest pain, SOB. Denies abdominal cramping or vaginal bleeding.    ROS: Denies cardiovascular or respiratory symptoms    PAST MEDICAL & SURGICAL HISTORY:  No pertinent past medical history  No significant past surgical history      Physical Exam  Vital Signs Last 24 Hrs  T(F): 97.6 (2023 08:13), Max: 98.3 (15 Apr 2023 20:00)  HR: 78 (2023 08:13) (78 - 92)  BP: 99/62 (2023 08:13) (93/57 - 99/62)  RR: 18 (2023 03:07) (18 - 18)    Physical exam:  General - AAOx3, NAD  Heart - S1S2, RRR  Lungs - CTA BL  Abdomen:  - Soft, nontender, nondistended, BS+  Extremities - No calf tenderness, no swelling    Labs:                        12.1   8.17  )-----------( 285      ( 2023 07:20 )             35.8                         11.5   6.23  )-----------( 254      ( 15 Apr 2023 08:23 )             33.1       Additional labs:   11.56>13.5/39.1<297 137/3.8/103/20/6/0.6<91 AST/ALT 37/82 UA Large ketones O pos, Ucx neg  4/15 6.23>11.5/33.1<254, Mg 1.8, phos 3.0, beta hydroxy butyrate 0.4  Ucx: neg    Imagin/14 TVUS Findings IMPRESSION: Since 3/29/2023:  Single live intrauterine pregnancy with estimated gestational age of 7 weeks and 4 days. Fetal heart rate 175 bpm. No significant change in subchorionic hemorrhage measuring up to 1.7 cm, previously up to 1.5 cm. Right ovarian corpus luteal cyst measuring up to 2.1 cm. The left ovary is not visualized. No sonographic evidence of ectopic pregnancy.   CT head: no acute pathology   Appendix sono:  Limited/focused right lower quadrant ultrasound without sonographic evidence for free fluid or focal collection in the right lower quadrant. Incidental note made of a 1.8 cm right corpus luteum and intrauterine gestation.   EKG NSR    
GENERAL SURGERY PROGRESS NOTE    Patient: MINI KINNEY , 27y ([birth year])Female   MRN: [id number]  Location: [address]  Visit: 23 Inpatient  Date: 04-15-23 @ 13:12    Procedure/Dx/Injuries: s/p lap appy 1 week ago in [place]    Events of past 24 hours: Tolerating regular diet, states nausea and vomiting improving.     PAST MEDICAL & SURGICAL HISTORY:  No pertinent past medical history      No significant past surgical history          Vitals:   T(F): 99.7 (04-15-23 @ 08:18), Max: 99.7 (04-15-23 @ 08:18)  HR: 85 (04-15-23 @ 08:18)  BP: 92/54 (04-15-23 @ 08:18)  RR: 18 (04-15-23 @ 05:04)  SpO2: --      Diet, Regular      Fluids:     I & O's:    PHYSICAL EXAM:  General: NAD, AAOx3, calm and cooperative  Cardiac: RRR  Respiratory: CTAB, normal respiratory effort  Abdomen: Soft, non-distended, mild RUQ tenderness, post surgical wounds clean  Skin: Warm/dry, normal color, no jaundice  Incision/wound: healing well, clean, dry and intact    MEDICATIONS  (STANDING):  cefTRIAXone   IVPB 1000 milliGRAM(s) IV Intermittent every 24 hours  famotidine  IVPB 20 milliGRAM(s) IV Intermittent two times a day  lactated ringers. 1000 milliLiter(s) (100 mL/Hr) IV Continuous <Continuous>  lactated ringers. 1000 milliLiter(s) (125 mL/Hr) IV Continuous <Continuous>  ondansetron Injectable 4 milliGRAM(s) IV Push every 6 hours  prochlorperazine Suppository 25 milliGRAM(s) Rectal every 12 hours  pyridoxine 25 milliGRAM(s) Oral two times a day  sodium chloride 0.9% 1000 milliLiter(s) (125 mL/Hr) IV Continuous <Continuous>  sucralfate 1 Gram(s) Oral <User Schedule>    MEDICATIONS  (PRN):      DVT PROPHYLAXIS:   GI PROPHYLAXIS:   ANTICOAGULATION:   ANTIBIOTICS:  cefTRIAXone   IVPB 1000 milliGRAM(s)            LAB/STUDIES:  Labs:  CAPILLARY BLOOD GLUCOSE                              11.5   6.23  )-----------( 254      ( 15 Apr 2023 08:23 )             33.1       Auto Neutrophil %: 61.0 % (04-15-23 @ 08:23)  Auto Immature Granulocyte %: 0.2 % (04-15-23 @ 08:23)    04-15    137  |  105  |  4<L>  ----------------------------<  106<H>  3.4<L>   |  18  |  0.5<L>      Magnesium, Serum: 1.8 mg/dL (04-15-23 @ 08:23)      LFTs:             5.8  | 0.8  | 59       ------------------[71      ( 15 Apr 2023 08:23 )  3.9  | x    | 81          Lipase:x      Amylase:x         Blood Gas Venous - Lactate: 1.30 mmol/L (23 @ 02:48)      Coags:    CARDIAC MARKERS ( 2023 02:40 )  x     / <0.01 ng/mL / x     / x     / x              Urinalysis Basic - ( 2023 04:10 )    Color: Light Yellow / Appearance: Slightly Turbid / S.007 / pH: x  Gluc: x / Ketone: Large  / Bili: Negative / Urobili: <2 mg/dL   Blood: x / Protein: Negative / Nitrite: Negative   Leuk Esterase: Negative / RBC: 2 /HPF / WBC 10 /HPF   Sq Epi: x / Non Sq Epi: x / Bacteria: Moderate  
Reason for Admission: hyperemesis gravidarum    Gestational Age: 7w1d  Hospital Day: 2    Patient seen and examined at bedside. She reports some LLQ pain. Nausea has improved and patient has tolerated a small amount of PO. Denies fever, chills, vomiting, severe abdominal pain, dysuria. Denies chest pain, SOB. Denies abdominal cramping or vaginal bleeding.    PAST MEDICAL & SURGICAL HISTORY:  No pertinent past medical history    No significant past surgical history    Physical Exam:   Vital Signs Last 24 Hrs  T(C): 36.8 (15 Apr 2023 05:04), Max: 37.2 (14 Apr 2023 17:01)  T(F): 98.3 (15 Apr 2023 05:04), Max: 98.9 (14 Apr 2023 17:01)  HR: 75 (15 Apr 2023 05:04) (69 - 83)  BP: 80/48 (15 Apr 2023 05:04) (80/48 - 96/63)  RR: 18 (15 Apr 2023 05:04) (18 - 18)    Gen: AOx3, NAD   Cardio: RRR, S1S2, no m/r/g  Pulm: CTA b/l  Abdomen: soft, gravid, nontender, no palpable contractions   MSK: normal passive and active range of motion   Neuro: CN2-12 intact   Extremities: no swelling or erythema noted   Psych: normal mood and affect, no suicidal or homicidal ideations     Labs:                        13.5   11.56 )-----------( 297      ( 14 Apr 2023 02:40 )             39.1        MEDICATIONS  (STANDING):  cefTRIAXone   IVPB 1000 milliGRAM(s) IV Intermittent every 24 hours  famotidine  IVPB 20 milliGRAM(s) IV Intermittent two times a day  lactated ringers. 1000 milliLiter(s) (100 mL/Hr) IV Continuous <Continuous>  lactated ringers. 1000 milliLiter(s) (125 mL/Hr) IV Continuous <Continuous>  ondansetron Injectable 4 milliGRAM(s) IV Push every 6 hours  prochlorperazine Suppository 25 milliGRAM(s) Rectal every 12 hours  pyridoxine 25 milliGRAM(s) Oral two times a day  sodium chloride 0.9% 1000 milliLiter(s) (125 mL/Hr) IV Continuous <Continuous>  sucralfate 1 Gram(s) Oral <User Schedule>

## 2023-04-16 NOTE — DISCHARGE NOTE PROVIDER - HOSPITAL COURSE
DATE OF DISCHARGE: 23 @ 08:51    HISTORY OF PRESENT ILLNESS/HOSPITAL COURSE: HPI:  26 yo  @ 7w0d by LMP (23) presents to the ED with nausea and vomiting s/p appendectomy 2 weeks ago. She states that she has had nausea since early February with intermittent vomiting. Over the last two weeks her nausea and vomiting worsened, with daily vomiting episodes. Over the last few days she has not been able to tolerate anything PO. On her way to the hospital her family states that she lost consciousness for about 20 sec, while sitting in the car. She endorses weakness and chills. She also endorses right sided pain from around the iliac crest to the mid chest, dull, worse with movement. She denies any fever, abdominal pain, vb, vaginal discharge, chest pain, sob, dysuria. She has not seen an OBGYN this pregnancy.      Ob/Gyn History:                   LMP - 23             Endorses h/o ovarian cysts, Denies history of uterine fibroids, abnormal paps, or STIs     (2023 10:03)    PAST MEDICAL & SURGICAL HISTORY:  No pertinent past medical history      No significant past surgical history          PROCEDURES PERFORMED: Term spontaneous vaginal delivery  COMPLICATIONS:  -----   POST PARTUM COURSE: uncomplicated, discharged home on PPD2  FINAL DIAGNOSIS:  Status post normal spontaneous vaginal delivery at Gestational Age    DISCHARGE CBC:                       12.1   8.17  )-----------( 285      ( 2023 07:20 )             35.8     DISCHARGE INSTRUCTIONS:  If you have severe abdominal pain, heavy vaginal bleeding, shortness of breath or chest pain please call your doctor or come to the emergency room.   DIET:  Advance as tolerated.  ACTIVITY:  Advance as tolerated.  Pelvic rest for 6 weeks.  Nothing to be inserted into the vagina for 6 weeks, i.e. no tampons, douching, sexual relations, or tub baths.   FOLLOW UP: Make an appointment to see your doctor as instructed   PRESCRIPTIONS: Prescriptions:  Carafate 1 g oral tablet: 1 tab(s) orally 4 times a day (before meals and at bedtime)  (17 Mar 2023 01:47)  ondansetron 4 mg oral tablet, disintegratin tab(s) orally every 8 hours as needed for nausea (04 Mar 2023 05:37)  Protonix 40 mg oral granule, delayed release: 1 each orally once a day  (17 Mar 2023 01:47)    Patient now tolerating PO without vomiting for >24hrs

## 2023-04-16 NOTE — DISCHARGE NOTE PROVIDER - CARE PROVIDER_API CALL
Xenia Méndez)  [facility]  [address] [place] [address]  Phone: [phone]  Fax: [phone]  Follow Up Time: 1 week

## 2023-04-17 LAB — GLUCOSE BLDC GLUCOMTR-MCNC: 85 MG/DL — SIGNIFICANT CHANGE UP (ref 70–99)

## 2023-04-18 PROCEDURE — 76705 ECHO EXAM OF ABDOMEN: CPT | Mod: 26

## 2023-04-20 DIAGNOSIS — O21.0 MILD HYPEREMESIS GRAVIDARUM: ICD-10-CM

## 2023-04-20 DIAGNOSIS — N13.30 UNSPECIFIED HYDRONEPHROSIS: ICD-10-CM

## 2023-04-20 DIAGNOSIS — Z20.822 CONTACT WITH AND (SUSPECTED) EXPOSURE TO COVID-19: ICD-10-CM

## 2023-04-20 DIAGNOSIS — Z90.49 ACQUIRED ABSENCE OF OTHER SPECIFIED PARTS OF DIGESTIVE TRACT: ICD-10-CM

## 2023-04-20 DIAGNOSIS — Z3A.01 LESS THAN 8 WEEKS GESTATION OF PREGNANCY: ICD-10-CM

## 2023-04-20 DIAGNOSIS — O99.891 OTHER SPECIFIED DISEASES AND CONDITIONS COMPLICATING PREGNANCY: ICD-10-CM

## 2023-04-20 DIAGNOSIS — O23.01 INFECTIONS OF KIDNEY IN PREGNANCY, FIRST TRIMESTER: ICD-10-CM

## 2023-04-23 ENCOUNTER — EMERGENCY (EMERGENCY)
Facility: HOSPITAL | Age: 28
LOS: 0 days | Discharge: HOME | End: 2023-04-23
Attending: EMERGENCY MEDICINE
Payer: MEDICAID

## 2023-04-23 VITALS
HEART RATE: 122 BPM | WEIGHT: 130.07 LBS | SYSTOLIC BLOOD PRESSURE: 148 MMHG | OXYGEN SATURATION: 99 % | DIASTOLIC BLOOD PRESSURE: 110 MMHG | TEMPERATURE: 98 F | HEIGHT: 60 IN | RESPIRATION RATE: 18 BRPM

## 2023-04-23 VITALS
SYSTOLIC BLOOD PRESSURE: 93 MMHG | DIASTOLIC BLOOD PRESSURE: 65 MMHG | HEART RATE: 94 BPM | OXYGEN SATURATION: 98 % | TEMPERATURE: 98 F

## 2023-04-23 DIAGNOSIS — Z3A.08 8 WEEKS GESTATION OF PREGNANCY: ICD-10-CM

## 2023-04-23 DIAGNOSIS — O99.611 DISEASES OF THE DIGESTIVE SYSTEM COMPLICATING PREGNANCY, FIRST TRIMESTER: ICD-10-CM

## 2023-04-23 DIAGNOSIS — O21.9 VOMITING OF PREGNANCY, UNSPECIFIED: ICD-10-CM

## 2023-04-23 DIAGNOSIS — O99.891 OTHER SPECIFIED DISEASES AND CONDITIONS COMPLICATING PREGNANCY: ICD-10-CM

## 2023-04-23 DIAGNOSIS — R74.01 ELEVATION OF LEVELS OF LIVER TRANSAMINASE LEVELS: ICD-10-CM

## 2023-04-23 DIAGNOSIS — K80.20 CALCULUS OF GALLBLADDER WITHOUT CHOLECYSTITIS WITHOUT OBSTRUCTION: ICD-10-CM

## 2023-04-23 LAB
ALBUMIN SERPL ELPH-MCNC: 4.9 G/DL — SIGNIFICANT CHANGE UP (ref 3.5–5.2)
ALP SERPL-CCNC: 94 U/L — SIGNIFICANT CHANGE UP (ref 30–115)
ALT FLD-CCNC: 113 U/L — HIGH (ref 0–41)
ANION GAP SERPL CALC-SCNC: 21 MMOL/L — HIGH (ref 7–14)
APPEARANCE UR: CLEAR — SIGNIFICANT CHANGE UP
AST SERPL-CCNC: 75 U/L — HIGH (ref 0–41)
BASOPHILS # BLD AUTO: 0.04 K/UL — SIGNIFICANT CHANGE UP (ref 0–0.2)
BASOPHILS NFR BLD AUTO: 0.3 % — SIGNIFICANT CHANGE UP (ref 0–1)
BILIRUB SERPL-MCNC: 1.2 MG/DL — SIGNIFICANT CHANGE UP (ref 0.2–1.2)
BILIRUB UR-MCNC: NEGATIVE — SIGNIFICANT CHANGE UP
BUN SERPL-MCNC: 10 MG/DL — SIGNIFICANT CHANGE UP (ref 10–20)
CALCIUM SERPL-MCNC: 10.2 MG/DL — SIGNIFICANT CHANGE UP (ref 8.4–10.5)
CHLORIDE SERPL-SCNC: 100 MMOL/L — SIGNIFICANT CHANGE UP (ref 98–110)
CO2 SERPL-SCNC: 17 MMOL/L — SIGNIFICANT CHANGE UP (ref 17–32)
COLOR SPEC: YELLOW — SIGNIFICANT CHANGE UP
CREAT SERPL-MCNC: 0.6 MG/DL — LOW (ref 0.7–1.5)
DIFF PNL FLD: NEGATIVE — SIGNIFICANT CHANGE UP
EGFR: 125 ML/MIN/1.73M2 — SIGNIFICANT CHANGE UP
EOSINOPHIL # BLD AUTO: 0.03 K/UL — SIGNIFICANT CHANGE UP (ref 0–0.7)
EOSINOPHIL NFR BLD AUTO: 0.2 % — SIGNIFICANT CHANGE UP (ref 0–8)
GLUCOSE SERPL-MCNC: 87 MG/DL — SIGNIFICANT CHANGE UP (ref 70–99)
GLUCOSE UR QL: NEGATIVE — SIGNIFICANT CHANGE UP
HCG SERPL-ACNC: HIGH MIU/ML
HCT VFR BLD CALC: 38 % — SIGNIFICANT CHANGE UP (ref 37–47)
HGB BLD-MCNC: 13.7 G/DL — SIGNIFICANT CHANGE UP (ref 12–16)
IMM GRANULOCYTES NFR BLD AUTO: 0.4 % — HIGH (ref 0.1–0.3)
KETONES UR-MCNC: ABNORMAL
LACTATE SERPL-SCNC: 1.8 MMOL/L — SIGNIFICANT CHANGE UP (ref 0.7–2)
LEUKOCYTE ESTERASE UR-ACNC: NEGATIVE — SIGNIFICANT CHANGE UP
LIDOCAIN IGE QN: 32 U/L — SIGNIFICANT CHANGE UP (ref 7–60)
LYMPHOCYTES # BLD AUTO: 1.57 K/UL — SIGNIFICANT CHANGE UP (ref 1.2–3.4)
LYMPHOCYTES # BLD AUTO: 12.2 % — LOW (ref 20.5–51.1)
MCHC RBC-ENTMCNC: 31.1 PG — HIGH (ref 27–31)
MCHC RBC-ENTMCNC: 36.1 G/DL — SIGNIFICANT CHANGE UP (ref 32–37)
MCV RBC AUTO: 86.2 FL — SIGNIFICANT CHANGE UP (ref 81–99)
MONOCYTES # BLD AUTO: 0.74 K/UL — HIGH (ref 0.1–0.6)
MONOCYTES NFR BLD AUTO: 5.7 % — SIGNIFICANT CHANGE UP (ref 1.7–9.3)
NEUTROPHILS # BLD AUTO: 10.45 K/UL — HIGH (ref 1.4–6.5)
NEUTROPHILS NFR BLD AUTO: 81.2 % — HIGH (ref 42.2–75.2)
NITRITE UR-MCNC: NEGATIVE — SIGNIFICANT CHANGE UP
NRBC # BLD: 0 /100 WBCS — SIGNIFICANT CHANGE UP (ref 0–0)
PH UR: 6.5 — SIGNIFICANT CHANGE UP (ref 5–8)
PLATELET # BLD AUTO: 323 K/UL — SIGNIFICANT CHANGE UP (ref 130–400)
PMV BLD: 11.7 FL — HIGH (ref 7.4–10.4)
POTASSIUM SERPL-MCNC: 3 MMOL/L — LOW (ref 3.5–5)
POTASSIUM SERPL-SCNC: 3 MMOL/L — LOW (ref 3.5–5)
PROT SERPL-MCNC: 7.5 G/DL — SIGNIFICANT CHANGE UP (ref 6–8)
PROT UR-MCNC: ABNORMAL
RBC # BLD: 4.41 M/UL — SIGNIFICANT CHANGE UP (ref 4.2–5.4)
RBC # FLD: 12.4 % — SIGNIFICANT CHANGE UP (ref 11.5–14.5)
SODIUM SERPL-SCNC: 138 MMOL/L — SIGNIFICANT CHANGE UP (ref 135–146)
SP GR SPEC: 1.03 — SIGNIFICANT CHANGE UP (ref 1.01–1.03)
UROBILINOGEN FLD QL: ABNORMAL
WBC # BLD: 12.88 K/UL — HIGH (ref 4.8–10.8)
WBC # FLD AUTO: 12.88 K/UL — HIGH (ref 4.8–10.8)

## 2023-04-23 PROCEDURE — 83605 ASSAY OF LACTIC ACID: CPT

## 2023-04-23 PROCEDURE — 82962 GLUCOSE BLOOD TEST: CPT

## 2023-04-23 PROCEDURE — 76830 TRANSVAGINAL US NON-OB: CPT

## 2023-04-23 PROCEDURE — 76705 ECHO EXAM OF ABDOMEN: CPT | Mod: 26

## 2023-04-23 PROCEDURE — 99284 EMERGENCY DEPT VISIT MOD MDM: CPT | Mod: 25

## 2023-04-23 PROCEDURE — 84702 CHORIONIC GONADOTROPIN TEST: CPT

## 2023-04-23 PROCEDURE — 96376 TX/PRO/DX INJ SAME DRUG ADON: CPT

## 2023-04-23 PROCEDURE — 83690 ASSAY OF LIPASE: CPT

## 2023-04-23 PROCEDURE — 36415 COLL VENOUS BLD VENIPUNCTURE: CPT

## 2023-04-23 PROCEDURE — 99285 EMERGENCY DEPT VISIT HI MDM: CPT

## 2023-04-23 PROCEDURE — 81001 URINALYSIS AUTO W/SCOPE: CPT

## 2023-04-23 PROCEDURE — 76705 ECHO EXAM OF ABDOMEN: CPT

## 2023-04-23 PROCEDURE — 99283 EMERGENCY DEPT VISIT LOW MDM: CPT

## 2023-04-23 PROCEDURE — 96375 TX/PRO/DX INJ NEW DRUG ADDON: CPT

## 2023-04-23 PROCEDURE — 85025 COMPLETE CBC W/AUTO DIFF WBC: CPT

## 2023-04-23 PROCEDURE — 96374 THER/PROPH/DIAG INJ IV PUSH: CPT

## 2023-04-23 PROCEDURE — 80053 COMPREHEN METABOLIC PANEL: CPT

## 2023-04-23 PROCEDURE — 76830 TRANSVAGINAL US NON-OB: CPT | Mod: 26

## 2023-04-23 PROCEDURE — 87086 URINE CULTURE/COLONY COUNT: CPT

## 2023-04-23 RX ORDER — ONDANSETRON 8 MG/1
4 TABLET, FILM COATED ORAL ONCE
Refills: 0 | Status: COMPLETED | OUTPATIENT
Start: 2023-04-23 | End: 2023-04-23

## 2023-04-23 RX ORDER — FAMOTIDINE 10 MG/ML
20 INJECTION INTRAVENOUS ONCE
Refills: 0 | Status: COMPLETED | OUTPATIENT
Start: 2023-04-23 | End: 2023-04-23

## 2023-04-23 RX ORDER — POTASSIUM CHLORIDE 20 MEQ
20 PACKET (EA) ORAL ONCE
Refills: 0 | Status: COMPLETED | OUTPATIENT
Start: 2023-04-23 | End: 2023-04-23

## 2023-04-23 RX ORDER — FAMOTIDINE 10 MG/ML
1 INJECTION INTRAVENOUS
Qty: 10 | Refills: 0
Start: 2023-04-23 | End: 2023-04-27

## 2023-04-23 RX ORDER — ONDANSETRON 8 MG/1
1 TABLET, FILM COATED ORAL
Qty: 2 | Refills: 0
Start: 2023-04-23 | End: 2023-04-27

## 2023-04-23 RX ORDER — MAGNESIUM SULFATE 500 MG/ML
2 VIAL (ML) INJECTION ONCE
Refills: 0 | Status: COMPLETED | OUTPATIENT
Start: 2023-04-23 | End: 2023-04-23

## 2023-04-23 RX ORDER — SODIUM CHLORIDE 9 MG/ML
1000 INJECTION, SOLUTION INTRAVENOUS ONCE
Refills: 0 | Status: COMPLETED | OUTPATIENT
Start: 2023-04-23 | End: 2023-04-23

## 2023-04-23 RX ADMIN — ONDANSETRON 4 MILLIGRAM(S): 8 TABLET, FILM COATED ORAL at 03:46

## 2023-04-23 RX ADMIN — ONDANSETRON 4 MILLIGRAM(S): 8 TABLET, FILM COATED ORAL at 11:32

## 2023-04-23 RX ADMIN — Medication 25 GRAM(S): at 07:03

## 2023-04-23 RX ADMIN — Medication 50 MILLIEQUIVALENT(S): at 07:03

## 2023-04-23 RX ADMIN — FAMOTIDINE 20 MILLIGRAM(S): 10 INJECTION INTRAVENOUS at 03:46

## 2023-04-23 RX ADMIN — SODIUM CHLORIDE 1000 MILLILITER(S): 9 INJECTION, SOLUTION INTRAVENOUS at 07:02

## 2023-04-23 NOTE — ED PROVIDER NOTE - PHYSICAL EXAMINATION
Physical Exam    Vital Signs: I have reviewed the initial vital signs.  Constitutional: well-nourished, appears stated age, no acute distress  Eyes: Conjunctiva pink, Sclera clear  Cardiovascular: S1 and S2, regular rate, regular rhythm, well-perfused extremities, radial pulses equal and 2+ b/l.   Respiratory: unlabored respiratory effort, clear to auscultation bilaterally no wheezing, rales and rhonchi. pt is speaking full sentences. no accessory muscle use.   Gastrointestinal: soft, (+) ruq tenderness and lower abdominal tenderness, nondistended abdomen, no pulsatile mass, no rebound, no guarding  Musculoskeletal: FROM of b/l upper and lower extremities.   Integumentary: warm, dry, no rash. (+) abdominal surgical incisions to the left lower quadrant and suprapubic area without warmth, drainage, erythema, or fluctuance.   Neurologic: awake, alert, steady gait.   Psychiatric: appropriate mood, appropriate affect Physical Exam    Vital Signs: I have reviewed the initial vital signs.  Constitutional: well-nourished, appears stated age, no acute distress  Eyes: Conjunctiva pink, Sclera clear  Cardiovascular: S1 and S2, regular rate, regular rhythm, well-perfused extremities, radial pulses equal and 2+ b/l.   Respiratory: unlabored respiratory effort, clear to auscultation bilaterally no wheezing, rales and rhonchi. pt is speaking full sentences. no accessory muscle use.   Gastrointestinal: soft, (+) ruq tenderness and lower abdominal tenderness, nondistended abdomen, no pulsatile mass, no rebound, no guarding  Genitourinary: exam chaperoned by Dr. Reyes. no vagina bleeding. no cmt. no palpable mass. no maldorous or colored vaginal discharge. cervical os is closed.   Musculoskeletal: FROM of b/l upper and lower extremities.   Integumentary: warm, dry, no rash. (+) abdominal surgical incisions to the left lower quadrant and suprapubic area without warmth, drainage, erythema, or fluctuance.   Neurologic: awake, alert, steady gait.   Psychiatric: appropriate mood, appropriate affect

## 2023-04-23 NOTE — ED PROVIDER NOTE - CLINICAL SUMMARY MEDICAL DECISION MAKING FREE TEXT BOX
patient evaluated for hyperemesis, slight elevation in lfts. pelvic us was nml for pregnancy, RUQ us showed cholelithasis, patient was given iv meds and symptoms improved. discussed plan of care with OB and patient will fu and trend her LFTs as outpatient. indications to return to the ED were explained to josie martinez.

## 2023-04-23 NOTE — CONSULT NOTE ADULT - SUBJECTIVE AND OBJECTIVE BOX
Chief Complaint: nausea and vomiting      HPI: 26 yo  @8w2d by LMP (23) presents to the ED with nausea and vomiting. Patient has had multiple ED visits for similar complaints. Patient was admitted - for inpatient management of hyperemesis. She was discharged home with compazine, pepcid, b6, zofran, protonix. Patient reports she took the compazine suppository and a few zofran for a couple days after discharge, but then discontinued the medication. Reports persistent nausea/vomitting since. Has been unable to tolerate PO since . She was seen in the ED in the [place] for similar symptoms on  and discharge home with Keflex for a UTI, diphenhydramine, reglan, and B6. Reports she has taken 4 doses of Keflex, 1 dose of diphenhydramine, 1 dose of reglan, and 1 dose of B6. Last episode of emesis was last night at 0200. Reports improvement in her symptoms currently after IV hydration and IV medication given in the ED. Of note patient had an appendectomy 3-4 weeks ago. During her last admission, patient was found to have cholelithiasis and mildly elevated LFTs. Denies fevers, chills, diarrhea, constipation. Denies urinary symptoms. Denies abdominal pain, discharge, vaginal bleeding.     Ob/Gyn History:                   LMP - 23             Endorses h/o ovarian cysts, Denies history of uterine fibroids, abnormal paps, or STIs    Denies the following: constitutional symptoms, visual symptoms, cardiovascular symptoms, respiratory symptoms, GI symptoms, musculoskeletal symptoms, skin symptoms, neurologic symptoms, hematologic symptoms, allergic symptoms, psychiatric symptoms  Except any pertinent positives listed.     Home Medications:  famotidine 10 mg/mL intravenous solution: 2 milliliter(s) intravenous 2 times a day (2023 08:52)  prochlorperazine 25 mg rectal suppository: 1 suppository(ies) rectal every 12 hours (2023 08:52)  pyridoxine 25 mg oral tablet: 1 tab(s) orally 2 times a day (2023 08:52)    No Known Allergies    PAST MEDICAL & SURGICAL HISTORY:  No pertinent past medical history  No significant past surgical history    FAMILY HISTORY:  No pertinent family history in first degree relatives    SOCIAL HISTORY: Denies cigarette use, alcohol use, or illicit drug use    Vital Signs Last 24 Hrs  T(F): 97.8 (2023 07:20), Max: 97.9 (2023 02:51)  HR: 94 (2023 07:20) (83 - 122)  BP: 93/65 (2023 07:20) (93/65 - 148/110)  RR: 18 (2023 05:19) (18 - 18)    General Appearance - AAOx3, NAD  Abdomen - Soft, nontender, nondistended, no rebound, no rigidity, no guarding    GYN/Pelvis: deferred    LABS:                        13.7   12.88 )-----------( 323      ( 2023 04:30 )             38.0     HCG Quantitative, Serum: 269921.0 mIU/mL (23 @ 04:30)          138  |  100  |  10  ----------------------------<  87  3.0<L>   |  17  |  0.6<L>    Ca    10.2      2023 04:30    TPro  7.5  /  Alb  4.9  /  TBili  1.2  /  DBili  x   /  AST  75<H>  /  ALT  113<H>  /  AlkPhos  94        Urinalysis Basic - ( 2023 08:27 )    Color: Yellow / Appearance: Clear / S.030 / pH: x  Gluc: x / Ketone: Large  / Bili: Negative / Urobili: 6 mg/dL   Blood: x / Protein: 30 mg/dL / Nitrite: Negative   Leuk Esterase: Negative / RBC: 3 /HPF / WBC 2 /HPF   Sq Epi: x / Non Sq Epi: x / Bacteria: Occasional          RADIOLOGY & ADDITIONAL STUDIES:  < from: US Transvaginal (23 @ 04:39) >    ACC: [id number] EXAM:  US TRANSVAGINAL   ORDERED BY: LORENA GREY     PROCEDURE DATE:  2023          INTERPRETATION:  CLINICAL INFORMATION: Abdominal pain. Pregnancy.    LMP: 2023    COMPARISON: Pelvic ultrasound dated 2023    TECHNIQUE:  Endovaginal pelvic sonogram only. Color and Spectral Doppler was   performed.    FINDINGS:  Uterus: 9.5 x 7.0 x 7.6 cm  Single intrauterine pregnancy.  Mean sac diameter measures 3.74 cm corresponding to estimated gestational   age of 9 weeks, 0 days.  Crown-rump length measures 2.68 cm corresponding to an estimated   gestational age of 9 weeks, 3 days.  Fetal heart rate measures 175 bpm.  Yolk sac is visualized.  There is a small subchorionic hematoma contacting less than 15%   gestational sac circumference (measuring approximately 1.3 x 0.5 cm   (previously up to 1.8 cm).    Right ovary: Not visualized.  Left ovary: 1.9 x 1.2 x 1.9 cm. Within normal limits. Doppler flow   demonstrated to the left ovary.    Fluid: None.    IMPRESSION:    Single intrauterine pregnancy corresponding to an estimated gestational   age of 9 weeks, 3 days by CRL.  Fetal heart rate at 175 bpm.  Redemonstrated small subchorionic hemorrhage.  Nonvisualized right ovary. Normal left ovary.    --- End of Report ---            ALVARO MEEHAN MD; Attending Radiologist  This document has been electronically signed. 2023  6:53AM    < end of copied text >  < from: US Abdomen Upper Quadrant Right (23 @ 04:37) >    ACC: [id number] EXAM:  US ABDOMEN RT UPR QUADRANT   ORDERED BY: LORENA GREY     PROCEDURE DATE:  2023          INTERPRETATION:  CLINICAL INFORMATION: Right upper quadrant pain, nausea   and vomiting    COMPARISON: US right upper quadrant3/    TECHNIQUE: Sonography of the right upper quadrant.    FINDINGS:  Liver: Increased echogenicity.  Bile ducts: Normal caliber. Common bile duct measures 3 mm.  Gallbladder: Cholelithiasis versus gallbladder sludge. No gallbladder   wall thickening, pericholecystic fluid or reported sonographic Lind   sign.  Pancreas: Visualized portions are within normal limits.  Right kidney: 12.0 cm. No hydronephrosis.  Ascites: None.  IVC: Visualized portions are within normal limits.    IMPRESSION:    Cholelithiasis versus gallbladder sludge.  No sonographic evidence for acute cholecystitis.  Hepatic steatosis.    --- End of Report ---          CIERRA LIMON MD; Resident Radiologist  This document has been electronically signed.  ALVARO MEEHAN MD; Attending Radiologist  This document has been electronically signed. 2023  6:46AM    < end of copied text >

## 2023-04-23 NOTE — CONSULT NOTE ADULT - ASSESSMENT
26 yo  @8w2d with nausea and vomiting, currently clinically and hemodynamically stable  -No acute OBGYN intervention at this time  -As patient reporting improvement in symptoms, recommend PO challenge  -Discussed with patient that her symptoms are likely due to inconsistent use of her medications. Patient instructed to take medications daily until improvement in symptoms. Patient advised to wean off of medication later as recommended by OB.   -Recommend rx for zofran 4mg q6hrs and 20mg pepcid BI. Patient to continue B6, reglan and Keflex as prescribed.   -Transaminitis likely secondary to hyperemesis and cholelithiasis. Recommend trending LFTs outpatient. Please give patient hard copy of her labs to show to her OB.    -Patient to follow with OBGYN at scheduled appoint on .   -Replete electrolytes per ED  -Patient cleared by OBGYN if tolerating PO.  -Dispo per ED.    Discussed with Dr Richardson and Dr Pollack

## 2023-04-23 NOTE — ED PROVIDER NOTE - OBJECTIVE STATEMENT
zabrina [id number] and then beena [id number], 28 y/o female about 8 weeks pregnant  with no significant PMH presents to the ED for evaluation of nbnb emesis x 2 months. pt reports she has lost 10 pounds in two months. pt reports her LMP was 2023, and first positive pregnancy test was around 2023. PT has hx of appendectomy a few weeks ago in [place]. pt reports she is on vitamin b6, keflex, reglan, and benadryl without relief. pt was see in the ED  and admitted. pt reports she was also seen at [facility] for the same symptoms. pt has been diagnosed with hyperemesis gravidarum. pt denies fever, chills, chest pain, sob, back pain, diarrhea, constipation, urinary symptoms, vaginal bleeding, or malodorous or colored vaginal discharge.

## 2023-04-23 NOTE — ED PROVIDER NOTE - NSFOLLOWUPINSTRUCTIONS_ED_ALL_ED_FT
Hyperemesis Gravidarum    WHAT YOU NEED TO KNOW:    What is hyperemesis gravidarum? Hyperemesis gravidarum is a severe form of nausea and vomiting that happens during pregnancy. Hyperemesis is more severe than morning sickness. It may cause you to have nausea or vomiting all day for many days. It may also keep you from getting enough food and liquid.    What increases my risk for hyperemesis gravidarum? The cause of hyperemesis is not known. Any of the following can increase your risk:  •Pregnant with more than 1 baby      •History of motion sickness or migraine headaches      •Hyperemesis with a previous pregnancy      •Family history of hyperemesis       •Pregnant with a girl baby      What are the signs and symptoms of hyperemesis gravidarum? Signs and symptoms usually begin in the first trimester (first 12 weeks). Hyperemesis often goes away during the second trimester (after 20 weeks) but may continue through the entire pregnancy.   •Severe nausea and vomiting, and dry retching       •Easily affected by strong smells      •Poor appetite or change in taste      •Symptoms of dehydration, such as dark yellow urine, dry mouth and lips, dry skin, and urinating less than usual      •Fatigue      •Dizziness when you stand      •Weight loss      How is hyperemesis gravidarum diagnosed? Your healthcare provider will examine you and check your weight. He or she will test for other problems. If no other problems are found, your provider will diagnose hyperemesis gravidarum. Blood and urine tests may show dehydration. The tests may also show how well your organs are working. You may need an ultrasound to make sure your baby is okay.     How is hyperemesis gravidarum treated and managed? You may be admitted to the hospital if you are dehydrated. Your healthcare provider may suggest any of the following:   •Eat small amounts of food every 1 to 2 hours. Some examples of good foods to eat include broth, toast, fruit, eggs, gelatin, or cottage cheese. Do not eat spicy or high-fat foods. Try to eat crackers before getting out of bed each morning. Foods and drinks with ginger, such as ginger ale, may help to decrease nausea and vomiting.      •Drink liquids as directed. You may need to drink small amounts of liquid often to prevent dehydration. Ask how much liquid to drink each day and which liquids are best for you.       •Rest when you need to. Start activity slowly and work up to your usual routine as you start to feel better.      •Avoid things that may make hyperemesis worse. Avoid odors, heat, and humidity. Limit noise and flickering lights.       •Medicines, vitamins, or supplements may be given to help decrease nausea and vomiting.      •Weigh yourself daily if directed by your healthcare provider. You may need to keep a record of your daily weights for your healthcare provider. He or she may want to make sure you are not losing too much weight.      When should I seek immediate care?   •You have signs of severe dehydration including little to no urine and dry mouth or lips.      •You have severe stomach pain.      •You feel too weak or dizzy to stand up.      •You see blood in your vomit or bowel movements.      When should I contact my healthcare provider?   •You cannot keep any food or liquid down.      •You are losing weight.      •You have a fever.      •You have questions or concerns about your condition or care.      CARE AGREEMENT:    You have the right to help plan your care. Learn about your health condition and how it may be treated. Discuss treatment options with your healthcare providers to decide what care you want to receive. You always have the right to refuse treatment.

## 2023-04-23 NOTE — ED PROVIDER NOTE - PROGRESS NOTE DETAILS
FF: obgyn consulted. JR: patient endorsed to Dr. Donovan- plan f/u urinalysis, obgyn eval and dispo. MM: Patient received in signout pending OB/GYN evaluation and recommendations.  Per OB recommendations, symptoms are likely due to inconsistent use of her medications and was advised to take medications daily until improvement.  Recommending prescription Zofran 4 mg every 6 hours and 20 mg Pepcid twice daily.  Patient to continue B6, Reglan and Keflex as prescribed. Patient tolerated p.o. with no abdominal pain or nausea or vomiting. MM: On reassessment, patient feeling much improved.  Requesting discharge at this time.  Will follow-up with OB.

## 2023-04-23 NOTE — ED PROVIDER NOTE - ATTENDING APP SHARED VISIT CONTRIBUTION OF CARE
27 year old female  at approximately 8 weeks gestation, was admitted - for hyperemesis gravidarum (TVUS at the time showed IUP at 7w4d,  subchorionic hemorrhage measuring up to 1.7 cm), is on reglan, pyridoxine, pepcid, who presents to [facility] for worsening vomiting and inability to keep down food/drink. Associated with upper abdominal pain, worse with any food. LMP , and reports +IUP on TVUS outpatient. Denies vaginal bleeding, fever, vomiting, dysuria, flank pain.     Obgyn: Dr. Alex    vs noted, triage note reviewed    Gen - NAD, Head - NCAT, Pharynx - clear, MMM, Heart - RRR, no m/g/r, Lungs - CTAB, no w/c/r, Abdomen - soft, + hitchcock's sign, mildly tender suprapubic, Skin - No rash, Extremities - FROM, no edema, erythema, ecchymosis, brisk cap refill, Neuro - A&O x3, equal strength and sensation, non-focal exam    a/p:  vomiting, ruq ttp>suprapubic ttp  labs, ua, tvus, ruq us  ivf, gi cocktail, reassess 27 year old female  at approximately 8 weeks gestation, was admitted - for hyperemesis gravidarum (TVUS at the time showed IUP at 7w4d,  subchorionic hemorrhage measuring up to 1.7 cm), is on reglan, pyridoxine, pepcid, who presents to [facility] for worsening vomiting and inability to keep down food/drink. Associated with upper abdominal pain, worse with any food. LMP , and reports +IUP on TVUS outpatient. Denies vaginal bleeding, fever, vomiting, dysuria, flank pain.     Obgyn: Dr. Alex    vs noted, triage note reviewed    Gen - NAD, Head - NCAT, Pharynx - clear, MMM, Heart - RRR, no m/g/r, Lungs - CTAB, no w/c/r, Abdomen - soft, + hitchcock's sign, mildly tender suprapubic, Skin - No rash, Extremities - FROM, no edema, erythema, ecchymosis, brisk cap refill, Neuro - A&O x3, equal strength and sensation, non-focal exam, Pelvic- chaperone PA Frasca- no gross blood in the vaginal vault, no CMT, no AT, cervix closed    a/p:  vomiting despite Rx for hyperemesis gravidarum  ruq ttp>suprapubic ttp  labs, ua, tvus, ruq us  ivf, gi cocktail, reassess

## 2023-04-23 NOTE — ED PROVIDER NOTE - NS ED ATTENDING STATEMENT MOD
This was a shared visit with the DANIELITO. I reviewed and verified the documentation and independently performed the documented:

## 2023-04-24 ENCOUNTER — INPATIENT (INPATIENT)
Facility: HOSPITAL | Age: 28
LOS: 5 days | Discharge: HOME | DRG: 566 | End: 2023-04-30
Attending: OBSTETRICS & GYNECOLOGY | Admitting: OBSTETRICS & GYNECOLOGY
Payer: MEDICAID

## 2023-04-24 VITALS
WEIGHT: 135.14 LBS | HEART RATE: 94 BPM | DIASTOLIC BLOOD PRESSURE: 73 MMHG | TEMPERATURE: 98 F | RESPIRATION RATE: 18 BRPM | HEIGHT: 60 IN | SYSTOLIC BLOOD PRESSURE: 128 MMHG | OXYGEN SATURATION: 100 %

## 2023-04-24 LAB
ALBUMIN SERPL ELPH-MCNC: 4.5 G/DL — SIGNIFICANT CHANGE UP (ref 3.5–5.2)
ALP SERPL-CCNC: 79 U/L — SIGNIFICANT CHANGE UP (ref 30–115)
ALT FLD-CCNC: 95 U/L — HIGH (ref 0–41)
ANION GAP SERPL CALC-SCNC: 14 MMOL/L — SIGNIFICANT CHANGE UP (ref 7–14)
APPEARANCE UR: ABNORMAL
AST SERPL-CCNC: 50 U/L — HIGH (ref 0–41)
B-OH-BUTYR SERPL-SCNC: 1.3 MMOL/L — HIGH
BACTERIA # UR AUTO: ABNORMAL
BASE EXCESS BLDV CALC-SCNC: -3.6 MMOL/L — LOW (ref -2–3)
BASOPHILS # BLD AUTO: 0.03 K/UL — SIGNIFICANT CHANGE UP (ref 0–0.2)
BASOPHILS NFR BLD AUTO: 0.4 % — SIGNIFICANT CHANGE UP (ref 0–1)
BILIRUB SERPL-MCNC: 0.8 MG/DL — SIGNIFICANT CHANGE UP (ref 0.2–1.2)
BILIRUB UR-MCNC: NEGATIVE — SIGNIFICANT CHANGE UP
BUN SERPL-MCNC: 4 MG/DL — LOW (ref 10–20)
CA-I SERPL-SCNC: 1.19 MMOL/L — SIGNIFICANT CHANGE UP (ref 1.15–1.33)
CALCIUM SERPL-MCNC: 9.5 MG/DL — SIGNIFICANT CHANGE UP (ref 8.4–10.4)
CHLORIDE SERPL-SCNC: 105 MMOL/L — SIGNIFICANT CHANGE UP (ref 98–110)
CO2 SERPL-SCNC: 19 MMOL/L — SIGNIFICANT CHANGE UP (ref 17–32)
COLOR SPEC: YELLOW — SIGNIFICANT CHANGE UP
CREAT SERPL-MCNC: <0.5 MG/DL — LOW (ref 0.7–1.5)
DIFF PNL FLD: NEGATIVE — SIGNIFICANT CHANGE UP
EGFR: 138 ML/MIN/1.73M2 — SIGNIFICANT CHANGE UP
EOSINOPHIL # BLD AUTO: 0.07 K/UL — SIGNIFICANT CHANGE UP (ref 0–0.7)
EOSINOPHIL NFR BLD AUTO: 1 % — SIGNIFICANT CHANGE UP (ref 0–8)
EPI CELLS # UR: 22 /HPF — HIGH (ref 0–5)
GAS PNL BLDV: 135 MMOL/L — LOW (ref 136–145)
GAS PNL BLDV: SIGNIFICANT CHANGE UP
GAS PNL BLDV: SIGNIFICANT CHANGE UP
GLUCOSE SERPL-MCNC: 90 MG/DL — SIGNIFICANT CHANGE UP (ref 70–99)
GLUCOSE UR QL: NEGATIVE — SIGNIFICANT CHANGE UP
HCG SERPL-ACNC: HIGH MIU/ML
HCO3 BLDV-SCNC: 21 MMOL/L — LOW (ref 22–29)
HCT VFR BLD CALC: 37.7 % — SIGNIFICANT CHANGE UP (ref 37–47)
HCT VFR BLDA CALC: 41 % — SIGNIFICANT CHANGE UP (ref 39–51)
HGB BLD CALC-MCNC: 13.8 G/DL — SIGNIFICANT CHANGE UP (ref 12.6–17.4)
HGB BLD-MCNC: 13.2 G/DL — SIGNIFICANT CHANGE UP (ref 12–16)
HYALINE CASTS # UR AUTO: 9 /LPF — HIGH (ref 0–7)
IMM GRANULOCYTES NFR BLD AUTO: 0.3 % — SIGNIFICANT CHANGE UP (ref 0.1–0.3)
KETONES UR-MCNC: ABNORMAL
LACTATE BLDV-MCNC: 0.9 MMOL/L — SIGNIFICANT CHANGE UP (ref 0.5–2)
LEUKOCYTE ESTERASE UR-ACNC: ABNORMAL
LYMPHOCYTES # BLD AUTO: 2 K/UL — SIGNIFICANT CHANGE UP (ref 1.2–3.4)
LYMPHOCYTES # BLD AUTO: 28.3 % — SIGNIFICANT CHANGE UP (ref 20.5–51.1)
MCHC RBC-ENTMCNC: 30.6 PG — SIGNIFICANT CHANGE UP (ref 27–31)
MCHC RBC-ENTMCNC: 35 G/DL — SIGNIFICANT CHANGE UP (ref 32–37)
MCV RBC AUTO: 87.3 FL — SIGNIFICANT CHANGE UP (ref 81–99)
MONOCYTES # BLD AUTO: 0.54 K/UL — SIGNIFICANT CHANGE UP (ref 0.1–0.6)
MONOCYTES NFR BLD AUTO: 7.6 % — SIGNIFICANT CHANGE UP (ref 1.7–9.3)
NEUTROPHILS # BLD AUTO: 4.41 K/UL — SIGNIFICANT CHANGE UP (ref 1.4–6.5)
NEUTROPHILS NFR BLD AUTO: 62.4 % — SIGNIFICANT CHANGE UP (ref 42.2–75.2)
NITRITE UR-MCNC: NEGATIVE — SIGNIFICANT CHANGE UP
NRBC # BLD: 0 /100 WBCS — SIGNIFICANT CHANGE UP (ref 0–0)
PCO2 BLDV: 34 MMHG — LOW (ref 39–42)
PH BLDV: 7.39 — SIGNIFICANT CHANGE UP (ref 7.32–7.43)
PH UR: 6.5 — SIGNIFICANT CHANGE UP (ref 5–8)
PLATELET # BLD AUTO: 279 K/UL — SIGNIFICANT CHANGE UP (ref 130–400)
PMV BLD: 11.1 FL — HIGH (ref 7.4–10.4)
PO2 BLDV: 38 MMHG — SIGNIFICANT CHANGE UP
POTASSIUM BLDV-SCNC: 2.9 MMOL/L — CRITICAL LOW (ref 3.5–5.1)
POTASSIUM SERPL-MCNC: 3.3 MMOL/L — LOW (ref 3.5–5)
POTASSIUM SERPL-SCNC: 3.3 MMOL/L — LOW (ref 3.5–5)
PROT SERPL-MCNC: 7 G/DL — SIGNIFICANT CHANGE UP (ref 6–8)
PROT UR-MCNC: SIGNIFICANT CHANGE UP
RBC # BLD: 4.32 M/UL — SIGNIFICANT CHANGE UP (ref 4.2–5.4)
RBC # FLD: 12.7 % — SIGNIFICANT CHANGE UP (ref 11.5–14.5)
RBC CASTS # UR COMP ASSIST: 36 /HPF — HIGH (ref 0–4)
SAO2 % BLDV: 68.8 % — SIGNIFICANT CHANGE UP
SODIUM SERPL-SCNC: 138 MMOL/L — SIGNIFICANT CHANGE UP (ref 135–146)
SP GR SPEC: 1.01 — SIGNIFICANT CHANGE UP (ref 1.01–1.03)
UROBILINOGEN FLD QL: SIGNIFICANT CHANGE UP
WBC # BLD: 7.07 K/UL — SIGNIFICANT CHANGE UP (ref 4.8–10.8)
WBC # FLD AUTO: 7.07 K/UL — SIGNIFICANT CHANGE UP (ref 4.8–10.8)
WBC UR QL: 23 /HPF — HIGH (ref 0–5)

## 2023-04-24 PROCEDURE — 71045 X-RAY EXAM CHEST 1 VIEW: CPT | Mod: 26

## 2023-04-24 PROCEDURE — 99285 EMERGENCY DEPT VISIT HI MDM: CPT

## 2023-04-24 RX ORDER — SODIUM CHLORIDE 9 MG/ML
1000 INJECTION, SOLUTION INTRAVENOUS
Refills: 0 | Status: DISCONTINUED | OUTPATIENT
Start: 2023-04-24 | End: 2023-04-26

## 2023-04-24 RX ORDER — POTASSIUM CHLORIDE 20 MEQ
20 PACKET (EA) ORAL ONCE
Refills: 0 | Status: COMPLETED | OUTPATIENT
Start: 2023-04-24 | End: 2023-04-24

## 2023-04-24 RX ORDER — THIAMINE MONONITRATE (VIT B1) 100 MG
100 TABLET ORAL ONCE
Refills: 0 | Status: COMPLETED | OUTPATIENT
Start: 2023-04-24 | End: 2023-04-24

## 2023-04-24 RX ORDER — METOCLOPRAMIDE HCL 10 MG
10 TABLET ORAL ONCE
Refills: 0 | Status: COMPLETED | OUTPATIENT
Start: 2023-04-24 | End: 2023-04-24

## 2023-04-24 RX ORDER — SUCRALFATE 1 G
1 TABLET ORAL
Refills: 0 | Status: DISCONTINUED | OUTPATIENT
Start: 2023-04-24 | End: 2023-04-30

## 2023-04-24 RX ORDER — ONDANSETRON 8 MG/1
4 TABLET, FILM COATED ORAL ONCE
Refills: 0 | Status: COMPLETED | OUTPATIENT
Start: 2023-04-24 | End: 2023-04-24

## 2023-04-24 RX ORDER — SODIUM CHLORIDE 9 MG/ML
2000 INJECTION, SOLUTION INTRAVENOUS ONCE
Refills: 0 | Status: COMPLETED | OUTPATIENT
Start: 2023-04-24 | End: 2023-04-24

## 2023-04-24 RX ORDER — PYRIDOXINE HCL (VITAMIN B6) 100 MG
50 TABLET ORAL
Refills: 0 | Status: DISCONTINUED | OUTPATIENT
Start: 2023-04-24 | End: 2023-04-25

## 2023-04-24 RX ORDER — MAGNESIUM SULFATE 500 MG/ML
2 VIAL (ML) INJECTION ONCE
Refills: 0 | Status: COMPLETED | OUTPATIENT
Start: 2023-04-24 | End: 2023-04-24

## 2023-04-24 RX ORDER — PANTOPRAZOLE SODIUM 20 MG/1
40 TABLET, DELAYED RELEASE ORAL EVERY 12 HOURS
Refills: 0 | Status: DISCONTINUED | OUTPATIENT
Start: 2023-04-24 | End: 2023-04-29

## 2023-04-24 RX ADMIN — ONDANSETRON 4 MILLIGRAM(S): 8 TABLET, FILM COATED ORAL at 19:57

## 2023-04-24 RX ADMIN — SODIUM CHLORIDE 2000 MILLILITER(S): 9 INJECTION, SOLUTION INTRAVENOUS at 19:57

## 2023-04-24 RX ADMIN — ONDANSETRON 4 MILLIGRAM(S): 8 TABLET, FILM COATED ORAL at 20:40

## 2023-04-24 RX ADMIN — Medication 25 GRAM(S): at 23:34

## 2023-04-24 RX ADMIN — Medication 104 MILLIGRAM(S): at 22:59

## 2023-04-24 RX ADMIN — Medication 50 MILLIEQUIVALENT(S): at 23:35

## 2023-04-24 RX ADMIN — Medication 100 MILLIGRAM(S): at 19:57

## 2023-04-24 RX ADMIN — SODIUM CHLORIDE 2000 MILLILITER(S): 9 INJECTION, SOLUTION INTRAVENOUS at 22:30

## 2023-04-24 RX ADMIN — Medication 10 MILLIGRAM(S): at 23:30

## 2023-04-24 NOTE — H&P ADULT - HISTORY OF PRESENT ILLNESS
26 yo  @8w3d by LMP (23) presents to the ED with nausea and vomiting. Patient has had multiple ED visits for similar complaints. She was admitted to [facility] from - for inpatient management of hyperemesis gravidarum. She was discharged home with compazine, pepcid, b6, zofran, protonix. Patient reports she took the compazine suppository and a few zofran after discharge, her symptoms were improved for a couple of days, but then discontinued the medication. Reports persistent nausea/vomitting since then and has been unable to tolerate PO since . She was seen in the ED in the [place] for similar symptoms on  and discharge home with Keflex for a UTI, zofran, reglan, and B6. Despite these medications, she continues to endorse persistent nausea and vomiting, has been unable to tolerate PO food or hydration. Of note patient had an appendectomy 3-4 weeks ago. During her last admission, patient was found to have cholelithiasis and mildly elevated LFTs. Denies fevers, chills, diarrhea, constipation. Denies urinary symptoms. Denies abdominal pain, discharge, vaginal bleeding.     Ob/Gyn History:                   LMP - 23             Endorses h/o ovarian cysts, Denies history of uterine fibroids, abnormal paps, or STIs 28 yo  @8w3d by LMP (23) and first trim ze, presents to the ED with nausea and vomiting. Patient has had multiple ED visits for similar complaints. She was admitted to [facility] from - for inpatient management of hyperemesis gravidarum. She was discharged home with compazine, pepcid, b6, zofran, protonix. Patient reports she took the compazine suppository and a few zofran after discharge, her symptoms were improved for a couple of days, but then discontinued the medication. Reports persistent nausea/vomitting since then and has been unable to tolerate PO since . She was seen in the ED in the [place] for similar symptoms on  and discharge home with Keflex for a UTI, zofran, reglan, and B6. Despite these medications, she continues to endorse persistent nausea and vomiting, has been unable to tolerate PO food or hydration. Of note patient had an appendectomy 3-4 weeks ago. During her last admission, patient was found to have cholelithiasis and mildly elevated LFTs. Denies fevers, chills, diarrhea, constipation. Denies urinary symptoms. Denies abdominal pain, discharge, vaginal bleeding.     Ob/Gyn History:                   LMP - 23             Endorses h/o ovarian cysts, Denies history of uterine fibroids, abnormal paps, or STIs

## 2023-04-24 NOTE — ED PROVIDER NOTE - OBJECTIVE STATEMENT
27-year-old female with past medical history of recent appendicitis 3 weeks ago,  currently 8w4d, LMP 2023 presents to the ED complaining of persistent nausea, NBNB vomiting and inability to tolerate p.o. intake.  Patient has had similar symptoms intermittently for the past few weeks and has been admitted here and seen at multiple hospitals.  She has been taking Zofran, Reglan and Compazine with minimal relief of symptoms now.  She denies other complaints. Pt denies fever, chills, abdominal pain, diarrhea, headache, dizziness, weakness, chest pain, SOB, back pain, LOC, trauma, urinary symptoms, cough, calf pain/swelling, recent travel, recent surgery. 27-year-old female with past medical history of recent appendicitis 3 weeks ago,  currently 8w4d, LMP 2023 presents to the ED complaining of persistent nausea, NBNB vomiting and inability to tolerate p.o. intake.  Patient has had similar symptoms intermittently for the past few weeks and has been admitted here and seen at multiple hospitals. She also c/o non-productive cough x 4-5 days. She has been taking Zofran, Reglan and Compazine with minimal relief of symptoms now.  She denies other complaints. Pt denies fever, chills, abdominal pain, diarrhea, headache, dizziness, weakness, chest pain, SOB, back pain, LOC, trauma, urinary symptoms, calf pain/swelling, recent travel, recent surgery.

## 2023-04-24 NOTE — ED PROVIDER NOTE - PHYSICAL EXAMINATION
VITAL SIGNS: I have reviewed nursing notes and confirm.  CONSTITUTIONAL: 26 yo F laying on stretcher; in no acute distress.  SKIN: Skin exam is warm and dry, no acute rash.  HEAD: Normocephalic; atraumatic.  EYES: Conjunctiva and sclera clear.  ENT: No nasal discharge; airway clear.  NECK: Supple; non tender.  CARD: S1, S2 normal; no murmurs, gallops, or rubs. Regular rate and rhythm.  RESP: No wheezes, rales or rhonchi. Speaking in full sentences.   ABD: Normal bowel sounds; soft; non-distended; non-tender; No rebound or guarding. No CVA tenderness.  EXT: Normal ROM. No clubbing, cyanosis or edema.  NEURO: Alert, oriented. Grossly unremarkable. No focal deficits.

## 2023-04-24 NOTE — H&P ADULT - NSHPLABSRESULTS_GEN_ALL_CORE
13.2   7.07  )-----------( 279      ( 2023 20:00 )             37.7         138  |  105  |  4<L>  ----------------------------<  90  3.3<L>   |  19  |  <0.5<L>    Ca    9.5      2023 20:00    TPro  7.0  /  Alb  4.5  /  TBili  0.8  /  DBili  x   /  AST  50<H>  /  ALT  95<H>  /  AlkPhos  79  04-24        Beta Hydroxy-Butyrate: 1.3 mmoL/L (23 @ 20:00)     Urinalysis Basic - ( 2023 21:42 )  Color: Yellow / Appearance: Slightly Turbid / S.008 / pH: x  Gluc: x / Ketone: Large  / Bili: Negative / Urobili: <2 mg/dL   Blood: x / Protein: Trace / Nitrite: Negative   Leuk Esterase: Large / RBC: 36 /HPF / WBC 23 /HPF   Sq Epi: x / Non Sq Epi: x / Bacteria: Few    BSS: +FHR 140bpm

## 2023-04-24 NOTE — ED PROVIDER NOTE - ATTENDING APP SHARED VISIT CONTRIBUTION OF CARE
27-year-old female with history of appendicitis approximate 3 weeks ago,  1 para 0, approximately 8 weeks pregnant presenting today with vomiting.  Patient is on multiple visits for hyperemesis gravidarum.  States that despite multiple medications including Zofran Reglan Compazine patient persistently has had vomiting.  Denies abdominal pain.  Denies fevers.  Denies chest pain or shortness of breath.  States that she has not been able to tolerate any oral intake.  Denies diarrhea.  Denies dysuria or hematuria.  Denies vaginal discharge or vaginal bleeding.     CONSTITUTIONAL: Well-developed; well-nourished; in no acute distress.   SKIN: warm, dry  HEAD: Normocephalic; atraumatic.  EYES: PERRL, EOMI, no conjunctival erythema  ENT: No nasal discharge; airway clear. dry mucous membranes  NECK: Supple; non tender.  CARD: S1, S2 normal; no murmurs, gallops, or rubs. Regular rate and rhythm.   RESP: tachypneic, faint rhonchi to the LLL. no accessory muscle use.   ABD: soft ntnd.  EXT: Normal ROM.  No clubbing, cyanosis or edema.   NEURO: Alert, oriented, grossly unremarkable  PSYCH: Cooperative, appropriate.

## 2023-04-24 NOTE — H&P ADULT - NSHPPHYSICALEXAM_GEN_ALL_CORE
General Appearance - AAOx3, NAD, appears dehydrated and persistent N/V at bedside  Heart - S1S2 regular rate and rhythm  Lung - CTA Bilaterally  Abdomen - Soft, nontender, nondistended, no rebound, no rigidity, no guarding, bowel sounds present  GYN/Pelvis - Deferred

## 2023-04-24 NOTE — ED PROVIDER NOTE - CLINICAL SUMMARY MEDICAL DECISION MAKING FREE TEXT BOX
27-year-old female with history of appendicitis approximate 3 weeks ago,  1 para 0, approximately 8 weeks pregnant presenting today with vomiting.  Patient is on multiple visits for hyperemesis gravidarum. labs with signs of dehydration with elevated bhb. FHR noted on POCUS. electrolytes repleted. despite antiemetics, patient persistently vomiting. gyn consulted, admitted for further management.

## 2023-04-24 NOTE — H&P ADULT - ASSESSMENT
28 yo  @8w3d with persistent hyperemesis gravidarum, for inpatient management    - admit to GYN service  - vitals q4 hours  - SCDs  - daily weights  - IVF hydration: First bag is multivitamin with thiamine, Maintenance fluid throughout the day  - regular diet as tolerated  - EKG  - if QTc not prolonged, will proceed with following meds: Compazine 25mg rectal q12hr, Zofran 4mg IVP q6hr, pyridoxine 50mg PO q12hr, sucralfate 1g PO daily, pantoprazole 40mg IV q23 hr  - replace electrolytes  - AM labs  - FHR prior to discharge    Dr Forrest and Dr Leone aware.    26 yo  @8w3d with persistent hyperemesis gravidarum, for inpatient management    - admit to GYN service  - vitals q4 hours  - SCDs  - daily weights  - IVF hydration: First bag is multivitamin with folic acid and thiamine, Maintenance fluid of LR throughout the day  - regular diet as tolerated  - EKG  - if QTc not prolonged, will proceed with following meds: Compazine 25mg rectal q12hr, Zofran 4mg IVP q6hr, pyridoxine 50mg PO q12hr, sucralfate 1g PO daily, pantoprazole 40mg IV q23 hr  - replace electrolytes  - AM labs  - FHR prior to discharge    Dr Forrest and Dr Leone aware.    28 yo  @8w3d with persistent hyperemesis gravidarum, for inpatient management    - admit to GYN service  - vitals q4 hours  - SCDs  - daily weights  - IVF hydration: First bag is multivitamin with folic acid and thiamine, Maintenance fluid of LR throughout the day  - regular diet as tolerated  - EKG  - if QTc not prolonged, will proceed with following meds: Compazine 25mg rectal q12hr, Zofran 4mg IVP q6hr, pyridoxine 25mg PO q12hr, sucralfate 1g PO daily, pantoprazole 40mg IV q12 hr  - replace electrolytes  - AM labs  - FHR prior to discharge    Dr Forrest and Dr Leone aware.    28 yo  @8w3d with persistent hyperemesis gravidarum, for inpatient management    - admit to GYN service  - vitals q4 hours  - SCDs  - daily weights  - IVF hydration: First bag is multivitamin with folic acid and thiamine, Maintenance fluid of LR throughout the day  - regular diet as tolerated  - EKG to evaluate QYc  - if QTc not prolonged, will proceed with following meds: Zofran 4mg IVP q6hr, pyridoxine 25mg PO q12hr, sucralfate 1g PO daily, pantoprazole 40mg IV q12 hr   - replace electrolytes  - AM labs  - FHR prior to discharge    Dr Forrest and Dr Leone aware.

## 2023-04-25 DIAGNOSIS — O21.0 MILD HYPEREMESIS GRAVIDARUM: ICD-10-CM

## 2023-04-25 LAB
ALBUMIN SERPL ELPH-MCNC: 4 G/DL — SIGNIFICANT CHANGE UP (ref 3.5–5.2)
ALP SERPL-CCNC: 71 U/L — SIGNIFICANT CHANGE UP (ref 30–115)
ALT FLD-CCNC: 69 U/L — HIGH (ref 0–41)
ANION GAP SERPL CALC-SCNC: 14 MMOL/L — SIGNIFICANT CHANGE UP (ref 7–14)
AST SERPL-CCNC: 34 U/L — SIGNIFICANT CHANGE UP (ref 0–41)
B-OH-BUTYR SERPL-SCNC: 1.1 MMOL/L — HIGH
BASOPHILS # BLD AUTO: 0.04 K/UL — SIGNIFICANT CHANGE UP (ref 0–0.2)
BASOPHILS NFR BLD AUTO: 0.5 % — SIGNIFICANT CHANGE UP (ref 0–1)
BILIRUB SERPL-MCNC: 0.9 MG/DL — SIGNIFICANT CHANGE UP (ref 0.2–1.2)
BLD GP AB SCN SERPL QL: SIGNIFICANT CHANGE UP
BUN SERPL-MCNC: 3 MG/DL — LOW (ref 10–20)
CALCIUM SERPL-MCNC: 8.7 MG/DL — SIGNIFICANT CHANGE UP (ref 8.4–10.5)
CHLORIDE SERPL-SCNC: 107 MMOL/L — SIGNIFICANT CHANGE UP (ref 98–110)
CO2 SERPL-SCNC: 18 MMOL/L — SIGNIFICANT CHANGE UP (ref 17–32)
CREAT SERPL-MCNC: <0.5 MG/DL — LOW (ref 0.7–1.5)
CULTURE RESULTS: SIGNIFICANT CHANGE UP
EGFR: 138 ML/MIN/1.73M2 — SIGNIFICANT CHANGE UP
EOSINOPHIL # BLD AUTO: 0.07 K/UL — SIGNIFICANT CHANGE UP (ref 0–0.7)
EOSINOPHIL NFR BLD AUTO: 0.9 % — SIGNIFICANT CHANGE UP (ref 0–8)
GLUCOSE SERPL-MCNC: 70 MG/DL — SIGNIFICANT CHANGE UP (ref 70–99)
HCT VFR BLD CALC: 33 % — LOW (ref 37–47)
HGB BLD-MCNC: 11.6 G/DL — LOW (ref 12–16)
IMM GRANULOCYTES NFR BLD AUTO: 0.1 % — SIGNIFICANT CHANGE UP (ref 0.1–0.3)
LYMPHOCYTES # BLD AUTO: 2.63 K/UL — SIGNIFICANT CHANGE UP (ref 1.2–3.4)
LYMPHOCYTES # BLD AUTO: 32.2 % — SIGNIFICANT CHANGE UP (ref 20.5–51.1)
MAGNESIUM SERPL-MCNC: 2.4 MG/DL — SIGNIFICANT CHANGE UP (ref 1.8–2.4)
MCHC RBC-ENTMCNC: 31.1 PG — HIGH (ref 27–31)
MCHC RBC-ENTMCNC: 35.2 G/DL — SIGNIFICANT CHANGE UP (ref 32–37)
MCV RBC AUTO: 88.5 FL — SIGNIFICANT CHANGE UP (ref 81–99)
MONOCYTES # BLD AUTO: 0.58 K/UL — SIGNIFICANT CHANGE UP (ref 0.1–0.6)
MONOCYTES NFR BLD AUTO: 7.1 % — SIGNIFICANT CHANGE UP (ref 1.7–9.3)
NEUTROPHILS # BLD AUTO: 4.85 K/UL — SIGNIFICANT CHANGE UP (ref 1.4–6.5)
NEUTROPHILS NFR BLD AUTO: 59.2 % — SIGNIFICANT CHANGE UP (ref 42.2–75.2)
NRBC # BLD: 0 /100 WBCS — SIGNIFICANT CHANGE UP (ref 0–0)
PHOSPHATE SERPL-MCNC: 3.4 MG/DL — SIGNIFICANT CHANGE UP (ref 2.1–4.9)
PLATELET # BLD AUTO: 258 K/UL — SIGNIFICANT CHANGE UP (ref 130–400)
PMV BLD: 11.4 FL — HIGH (ref 7.4–10.4)
POTASSIUM SERPL-MCNC: 3.7 MMOL/L — SIGNIFICANT CHANGE UP (ref 3.5–5)
POTASSIUM SERPL-SCNC: 3.7 MMOL/L — SIGNIFICANT CHANGE UP (ref 3.5–5)
PROT SERPL-MCNC: 6 G/DL — SIGNIFICANT CHANGE UP (ref 6–8)
RBC # BLD: 3.73 M/UL — LOW (ref 4.2–5.4)
RBC # FLD: 12.7 % — SIGNIFICANT CHANGE UP (ref 11.5–14.5)
SODIUM SERPL-SCNC: 139 MMOL/L — SIGNIFICANT CHANGE UP (ref 135–146)
SPECIMEN SOURCE: SIGNIFICANT CHANGE UP
WBC # BLD: 8.18 K/UL — SIGNIFICANT CHANGE UP (ref 4.8–10.8)
WBC # FLD AUTO: 8.18 K/UL — SIGNIFICANT CHANGE UP (ref 4.8–10.8)

## 2023-04-25 PROCEDURE — 93005 ELECTROCARDIOGRAM TRACING: CPT

## 2023-04-25 PROCEDURE — 86850 RBC ANTIBODY SCREEN: CPT

## 2023-04-25 PROCEDURE — 82728 ASSAY OF FERRITIN: CPT

## 2023-04-25 PROCEDURE — 83735 ASSAY OF MAGNESIUM: CPT

## 2023-04-25 PROCEDURE — 86664 EPSTEIN-BARR NUCLEAR ANTIGEN: CPT

## 2023-04-25 PROCEDURE — 36415 COLL VENOUS BLD VENIPUNCTURE: CPT

## 2023-04-25 PROCEDURE — 86695 HERPES SIMPLEX TYPE 1 TEST: CPT

## 2023-04-25 PROCEDURE — 85025 COMPLETE CBC W/AUTO DIFF WBC: CPT

## 2023-04-25 PROCEDURE — 80074 ACUTE HEPATITIS PANEL: CPT

## 2023-04-25 PROCEDURE — 80048 BASIC METABOLIC PNL TOTAL CA: CPT

## 2023-04-25 PROCEDURE — 82010 KETONE BODYS QUAN: CPT

## 2023-04-25 PROCEDURE — 82784 ASSAY IGA/IGD/IGG/IGM EACH: CPT

## 2023-04-25 PROCEDURE — 86665 EPSTEIN-BARR CAPSID VCA: CPT

## 2023-04-25 PROCEDURE — 84100 ASSAY OF PHOSPHORUS: CPT

## 2023-04-25 PROCEDURE — 86900 BLOOD TYPING SEROLOGIC ABO: CPT

## 2023-04-25 PROCEDURE — 86901 BLOOD TYPING SEROLOGIC RH(D): CPT

## 2023-04-25 PROCEDURE — 93010 ELECTROCARDIOGRAM REPORT: CPT

## 2023-04-25 PROCEDURE — 83550 IRON BINDING TEST: CPT

## 2023-04-25 PROCEDURE — 86644 CMV ANTIBODY: CPT

## 2023-04-25 PROCEDURE — 84478 ASSAY OF TRIGLYCERIDES: CPT

## 2023-04-25 PROCEDURE — 86645 CMV ANTIBODY IGM: CPT

## 2023-04-25 PROCEDURE — 86255 FLUORESCENT ANTIBODY SCREEN: CPT

## 2023-04-25 PROCEDURE — 86376 MICROSOMAL ANTIBODY EACH: CPT

## 2023-04-25 PROCEDURE — C9113: CPT

## 2023-04-25 PROCEDURE — 86663 EPSTEIN-BARR ANTIBODY: CPT

## 2023-04-25 PROCEDURE — 86381 MITOCHONDRIAL ANTIBODY EACH: CPT

## 2023-04-25 PROCEDURE — 83540 ASSAY OF IRON: CPT

## 2023-04-25 PROCEDURE — 80053 COMPREHEN METABOLIC PANEL: CPT

## 2023-04-25 PROCEDURE — 76705 ECHO EXAM OF ABDOMEN: CPT

## 2023-04-25 RX ORDER — SODIUM CHLORIDE 9 MG/ML
1000 INJECTION, SOLUTION INTRAVENOUS
Refills: 0 | Status: DISCONTINUED | OUTPATIENT
Start: 2023-04-25 | End: 2023-04-26

## 2023-04-25 RX ORDER — ONDANSETRON 8 MG/1
4 TABLET, FILM COATED ORAL EVERY 6 HOURS
Refills: 0 | Status: DISCONTINUED | OUTPATIENT
Start: 2023-04-25 | End: 2023-04-26

## 2023-04-25 RX ORDER — PYRIDOXINE HCL (VITAMIN B6) 100 MG
25 TABLET ORAL
Refills: 0 | Status: DISCONTINUED | OUTPATIENT
Start: 2023-04-25 | End: 2023-04-26

## 2023-04-25 RX ORDER — SODIUM CHLORIDE 9 MG/ML
1000 INJECTION, SOLUTION INTRAVENOUS
Refills: 0 | Status: DISCONTINUED | OUTPATIENT
Start: 2023-04-25 | End: 2023-04-25

## 2023-04-25 RX ADMIN — Medication 1 GRAM(S): at 05:52

## 2023-04-25 RX ADMIN — Medication 2 GRAM(S): at 01:21

## 2023-04-25 RX ADMIN — PANTOPRAZOLE SODIUM 40 MILLIGRAM(S): 20 TABLET, DELAYED RELEASE ORAL at 05:52

## 2023-04-25 RX ADMIN — Medication 25 MILLIGRAM(S): at 17:08

## 2023-04-25 RX ADMIN — ONDANSETRON 4 MILLIGRAM(S): 8 TABLET, FILM COATED ORAL at 23:43

## 2023-04-25 RX ADMIN — Medication 25 MILLIGRAM(S): at 05:53

## 2023-04-25 RX ADMIN — PANTOPRAZOLE SODIUM 40 MILLIGRAM(S): 20 TABLET, DELAYED RELEASE ORAL at 17:08

## 2023-04-25 RX ADMIN — SODIUM CHLORIDE 125 MILLILITER(S): 9 INJECTION, SOLUTION INTRAVENOUS at 15:54

## 2023-04-25 RX ADMIN — ONDANSETRON 4 MILLIGRAM(S): 8 TABLET, FILM COATED ORAL at 11:26

## 2023-04-25 RX ADMIN — ONDANSETRON 4 MILLIGRAM(S): 8 TABLET, FILM COATED ORAL at 17:08

## 2023-04-25 RX ADMIN — SODIUM CHLORIDE 125 MILLILITER(S): 9 INJECTION, SOLUTION INTRAVENOUS at 06:10

## 2023-04-25 RX ADMIN — ONDANSETRON 4 MILLIGRAM(S): 8 TABLET, FILM COATED ORAL at 05:52

## 2023-04-25 NOTE — PROGRESS NOTE ADULT - SUBJECTIVE AND OBJECTIVE BOX
PGY2 Note    HD: #2  GA: 8w4d    Chief Complaint: N/V     HPI: Pt seen and examined at bedside. Continues to endorse nausea and vomiting, unable to tolerate PO. Denies HA, CP, SOB, fevers, chills, urinary symptoms, changes in bowel habits. Denies abdominal cramping, vaginal discharge, vaginal bleeding.    ROS: Denies cardiovascular or respiratory symptoms    PAST MEDICAL & SURGICAL HISTORY:  No pertinent past medical history  H/o appendectomy    Physical Exam  Vital Signs Last 24 Hrs  T(F): 98.1 (2023 18:57), Max: 98.1 (2023 18:57)  HR: 94 (2023 18:57) (94 - 94)  BP: 128/73 (2023 18:57) (128/73 - 128/73)  RR: 18 (2023 18:57) (18 - 18)    Physical exam:  General - AAOx3, NAD  Abdomen - Soft, nontender, nondistended, gravid uterus, no R/R/G  Pelvis/Vagina - Deferred  Extremities - No calf tenderness, no swelling    Labs:             13.2   7.07  )-----------( 279      ( 2023 20:00 )             37.7     04-24    138  |  105  |  4<L>  ----------------------------<  90  3.3<L>   |  19  |  <0.5<L>    Ca    9.5      2023 20:00    TPro  7.0  /  Alb  4.5  /  TBili  0.8  /  DBili  x   /  AST  50<H>  /  ALT  95<H>  /  AlkPhos  79  04-24        Beta Hydroxy-Butyrate: 1.3 mmoL/L (23 @ 20:00)     Urinalysis Basic - ( 2023 21:42 )  Color: Yellow / Appearance: Slightly Turbid / S.008 / pH: x  Gluc: x / Ketone: Large  / Bili: Negative / Urobili: <2 mg/dL   Blood: x / Protein: Trace / Nitrite: Negative   Leuk Esterase: Large / RBC: 36 /HPF / WBC 23 /HPF   Sq Epi: x / Non Sq Epi: x / Bacteria: Few     EKG QTc 476   PGY2 Note   ID [id number]    HD: #2  GA: 8w4d    Chief Complaint: N/V     HPI: Pt seen and examined at bedside. Continues to endorse nausea, but no vomiting since admission. Has not tried PO since last night, but was able to tolerate a few sips of water when taking her morning medications. Denies HA, CP, SOB, fevers, chills, urinary symptoms, changes in bowel habits. Denies abdominal cramping/pain, vaginal discharge, vaginal bleeding.    ROS: Denies cardiovascular or respiratory symptoms    PAST MEDICAL & SURGICAL HISTORY:  No pertinent past medical history  H/o appendectomy    Physical Exam  Vital Signs Last 24 Hrs  T(F): 98.1 (2023 18:57), Max: 98.1 (2023 18:57)  HR: 94 (2023 18:57) (94 - 94)  BP: 128/73 (2023 18:57) (128/73 - 128/73)  RR: 18 (2023 18:57) (18 - 18)    Physical exam:  General - AAOx3, NAD  Abdomen - Soft, nontender, nondistended, gravid uterus, no R/R/G  Pelvis/Vagina - Deferred  Extremities - No calf tenderness, no swelling    Labs:             13.2   7.07  )-----------( 279      ( 2023 20:00 )             37.7     04-24    138  |  105  |  4<L>  ----------------------------<  90  3.3<L>   |  19  |  <0.5<L>    Ca    9.5      2023 20:00    TPro  7.0  /  Alb  4.5  /  TBili  0.8  /  DBili  x   /  AST  50<H>  /  ALT  95<H>  /  AlkPhos  79  04-        Beta Hydroxy-Butyrate: 1.3 mmoL/L (23 @ 20:00)     Urinalysis Basic - ( 2023 21:42 )  Color: Yellow / Appearance: Slightly Turbid / S.008 / pH: x  Gluc: x / Ketone: Large  / Bili: Negative / Urobili: <2 mg/dL   Blood: x / Protein: Trace / Nitrite: Negative   Leuk Esterase: Large / RBC: 36 /HPF / WBC 23 /HPF   Sq Epi: x / Non Sq Epi: x / Bacteria: Few     EKG QTc 476

## 2023-04-25 NOTE — PROGRESS NOTE ADULT - ASSESSMENT
26 yo  @8w4d GA with persistent hyperemesis gravidarum, for inpatient management    - vitals q4 hours  - SCDs  - daily weights  - IVF hydration: First bag of the day is multivitamin in NS with folic acid and thiamine, Maintenance fluid of LR throughout the day  - regular diet as tolerated  - CXR done, f/u read  - f/u UCx  - current meds: Zofran 4mg IVP q6hr, pyridoxine 25mg PO q12hr, sucralfate 1g PO daily, pantoprazole 40mg IV q12 hr   - additional QTc prolonging antiemetics held at this time   - s/p 2mg magnesium and 20mEq potassium yesterday  - continue replace electrolytes as indicated  - AM labs ordered  - FHR prior to discharge    Dr Ward and OBGYN attending to be made aware.   26 yo  @8w4d GA with persistent hyperemesis gravidarum, for inpatient management    - vitals q4 hours  - SCDs  - daily weights  - IVF hydration: First bag of the day is multivitamin in NS with folic acid and thiamine, Maintenance fluid of LR throughout the day  - regular diet as tolerated, encouraged to separate solids from liquids   - ambulate as tolerated  - CXR done, f/u read  - f/u UCx  - current meds: Zofran 4mg IVP q6hr, pyridoxine 25mg PO q12hr, sucralfate 1g PO daily, pantoprazole 40mg IV q12 hr   - additional QTc prolonging antiemetics held at this time   - s/p 2mg magnesium and 20mEq potassium yesterday  - continue replace electrolytes as indicated  - AM labs ordered  - FHR prior to discharge    Dr Ward and OBGYN attending to be made aware.

## 2023-04-25 NOTE — PATIENT PROFILE ADULT - FALL HARM RISK - HARM RISK INTERVENTIONS

## 2023-04-26 LAB
ALBUMIN SERPL ELPH-MCNC: 3.9 G/DL — SIGNIFICANT CHANGE UP (ref 3.5–5.2)
ALP SERPL-CCNC: 70 U/L — SIGNIFICANT CHANGE UP (ref 30–115)
ALT FLD-CCNC: 70 U/L — HIGH (ref 0–41)
ANION GAP SERPL CALC-SCNC: 13 MMOL/L — SIGNIFICANT CHANGE UP (ref 7–14)
AST SERPL-CCNC: 42 U/L — HIGH (ref 0–41)
B-OH-BUTYR SERPL-SCNC: 1.4 MMOL/L — HIGH
BASOPHILS # BLD AUTO: 0.02 K/UL — SIGNIFICANT CHANGE UP (ref 0–0.2)
BASOPHILS NFR BLD AUTO: 0.3 % — SIGNIFICANT CHANGE UP (ref 0–1)
BILIRUB SERPL-MCNC: 1.1 MG/DL — SIGNIFICANT CHANGE UP (ref 0.2–1.2)
BUN SERPL-MCNC: 3 MG/DL — LOW (ref 10–20)
CALCIUM SERPL-MCNC: 8.8 MG/DL — SIGNIFICANT CHANGE UP (ref 8.4–10.5)
CHLORIDE SERPL-SCNC: 104 MMOL/L — SIGNIFICANT CHANGE UP (ref 98–110)
CO2 SERPL-SCNC: 20 MMOL/L — SIGNIFICANT CHANGE UP (ref 17–32)
CREAT SERPL-MCNC: <0.5 MG/DL — LOW (ref 0.7–1.5)
EGFR: 138 ML/MIN/1.73M2 — SIGNIFICANT CHANGE UP
EOSINOPHIL # BLD AUTO: 0.09 K/UL — SIGNIFICANT CHANGE UP (ref 0–0.7)
EOSINOPHIL NFR BLD AUTO: 1.1 % — SIGNIFICANT CHANGE UP (ref 0–8)
GLUCOSE SERPL-MCNC: 69 MG/DL — LOW (ref 70–99)
HCT VFR BLD CALC: 31.5 % — LOW (ref 37–47)
HGB BLD-MCNC: 11.2 G/DL — LOW (ref 12–16)
IMM GRANULOCYTES NFR BLD AUTO: 0.3 % — SIGNIFICANT CHANGE UP (ref 0.1–0.3)
LYMPHOCYTES # BLD AUTO: 2.38 K/UL — SIGNIFICANT CHANGE UP (ref 1.2–3.4)
LYMPHOCYTES # BLD AUTO: 30.1 % — SIGNIFICANT CHANGE UP (ref 20.5–51.1)
MAGNESIUM SERPL-MCNC: 1.7 MG/DL — LOW (ref 1.8–2.4)
MCHC RBC-ENTMCNC: 31.1 PG — HIGH (ref 27–31)
MCHC RBC-ENTMCNC: 35.6 G/DL — SIGNIFICANT CHANGE UP (ref 32–37)
MCV RBC AUTO: 87.5 FL — SIGNIFICANT CHANGE UP (ref 81–99)
MONOCYTES # BLD AUTO: 0.59 K/UL — SIGNIFICANT CHANGE UP (ref 0.1–0.6)
MONOCYTES NFR BLD AUTO: 7.5 % — SIGNIFICANT CHANGE UP (ref 1.7–9.3)
NEUTROPHILS # BLD AUTO: 4.8 K/UL — SIGNIFICANT CHANGE UP (ref 1.4–6.5)
NEUTROPHILS NFR BLD AUTO: 60.7 % — SIGNIFICANT CHANGE UP (ref 42.2–75.2)
NRBC # BLD: 0 /100 WBCS — SIGNIFICANT CHANGE UP (ref 0–0)
PHOSPHATE SERPL-MCNC: 3.4 MG/DL — SIGNIFICANT CHANGE UP (ref 2.1–4.9)
PLATELET # BLD AUTO: 227 K/UL — SIGNIFICANT CHANGE UP (ref 130–400)
PMV BLD: 11.3 FL — HIGH (ref 7.4–10.4)
POTASSIUM SERPL-MCNC: 3.5 MMOL/L — SIGNIFICANT CHANGE UP (ref 3.5–5)
POTASSIUM SERPL-SCNC: 3.5 MMOL/L — SIGNIFICANT CHANGE UP (ref 3.5–5)
PROT SERPL-MCNC: 5.7 G/DL — LOW (ref 6–8)
RBC # BLD: 3.6 M/UL — LOW (ref 4.2–5.4)
RBC # FLD: 12.7 % — SIGNIFICANT CHANGE UP (ref 11.5–14.5)
SODIUM SERPL-SCNC: 137 MMOL/L — SIGNIFICANT CHANGE UP (ref 135–146)
TRIGL SERPL-MCNC: 88 MG/DL — SIGNIFICANT CHANGE UP
WBC # BLD: 7.9 K/UL — SIGNIFICANT CHANGE UP (ref 4.8–10.8)
WBC # FLD AUTO: 7.9 K/UL — SIGNIFICANT CHANGE UP (ref 4.8–10.8)

## 2023-04-26 PROCEDURE — 93010 ELECTROCARDIOGRAM REPORT: CPT

## 2023-04-26 RX ORDER — PYRIDOXINE HCL (VITAMIN B6) 100 MG
25 TABLET ORAL
Refills: 0 | Status: DISCONTINUED | OUTPATIENT
Start: 2023-04-26 | End: 2023-04-30

## 2023-04-26 RX ORDER — PROCHLORPERAZINE MALEATE 5 MG
25 TABLET ORAL EVERY 12 HOURS
Refills: 0 | Status: DISCONTINUED | OUTPATIENT
Start: 2023-04-26 | End: 2023-04-30

## 2023-04-26 RX ORDER — ELECTROLYTE SOLUTION,INJ
1 VIAL (ML) INTRAVENOUS
Refills: 0 | Status: DISCONTINUED | OUTPATIENT
Start: 2023-04-26 | End: 2023-04-26

## 2023-04-26 RX ORDER — ONDANSETRON 8 MG/1
4 TABLET, FILM COATED ORAL EVERY 6 HOURS
Refills: 0 | Status: DISCONTINUED | OUTPATIENT
Start: 2023-04-26 | End: 2023-04-29

## 2023-04-26 RX ORDER — I.V. FAT EMULSION 20 G/100ML
1.63 EMULSION INTRAVENOUS
Qty: 100.04 | Refills: 0 | Status: DISCONTINUED | OUTPATIENT
Start: 2023-04-26 | End: 2023-04-26

## 2023-04-26 RX ADMIN — Medication 25 MILLIGRAM(S): at 17:10

## 2023-04-26 RX ADMIN — SODIUM CHLORIDE 125 MILLILITER(S): 9 INJECTION, SOLUTION INTRAVENOUS at 15:26

## 2023-04-26 RX ADMIN — Medication 1 EACH: at 21:24

## 2023-04-26 RX ADMIN — SODIUM CHLORIDE 125 MILLILITER(S): 9 INJECTION, SOLUTION INTRAVENOUS at 05:21

## 2023-04-26 RX ADMIN — PANTOPRAZOLE SODIUM 40 MILLIGRAM(S): 20 TABLET, DELAYED RELEASE ORAL at 17:10

## 2023-04-26 RX ADMIN — I.V. FAT EMULSION 31.3 GM/KG/DAY: 20 EMULSION INTRAVENOUS at 21:24

## 2023-04-26 RX ADMIN — ONDANSETRON 4 MILLIGRAM(S): 8 TABLET, FILM COATED ORAL at 11:24

## 2023-04-26 RX ADMIN — Medication 1 GRAM(S): at 11:24

## 2023-04-26 RX ADMIN — Medication 25 MILLIGRAM(S): at 05:23

## 2023-04-26 RX ADMIN — ONDANSETRON 4 MILLIGRAM(S): 8 TABLET, FILM COATED ORAL at 17:11

## 2023-04-26 RX ADMIN — PANTOPRAZOLE SODIUM 40 MILLIGRAM(S): 20 TABLET, DELAYED RELEASE ORAL at 05:24

## 2023-04-26 RX ADMIN — ONDANSETRON 4 MILLIGRAM(S): 8 TABLET, FILM COATED ORAL at 05:24

## 2023-04-26 NOTE — PROGRESS NOTE ADULT - SUBJECTIVE AND OBJECTIVE BOX
PGY2 Note    HD: #3  GA: 8w5d    HPI: Pt seen and examined at bedside. Reports nausea without vomiting.  Denies HA, CP, SOB, fevers, chills, urinary symptoms, changes in bowel habits. Denies abdominal cramping/pain, vaginal discharge, vaginal bleeding.    PAST MEDICAL & SURGICAL HISTORY:  No pertinent past medical history  H/o appendectomy    Physical Exam  Vital Signs Last 24 Hrs  T(F): 98.2 (26 Apr 2023 04:33), Max: 98.9 (26 Apr 2023 00:17)  HR: 74 (26 Apr 2023 04:33) (74 - 91)  BP: 90/60 (26 Apr 2023 04:33) (90/60 - 103/62)  RR: 18 (26 Apr 2023 04:33) (18 - 18)    Physical exam:  General - AAOx3, NAD  Abdomen - Soft, nontender, nondistended, BS+  Pelvis/Vagina - Deferred  Extremities - No calf tenderness, no swelling    Labs:                        11.6   8.18  )-----------( 258      ( 25 Apr 2023 06:04 )             33.0                         13.2   7.07  )-----------( 279      ( 24 Apr 2023 20:00 )             37.7     139  |  107  |  3  ----------------------------<70  3.7  |  18  |  <0.5    Magnesium, Serum: 2.4 mg/dL (04-25-23 @ 06:04)    Phosphorus Level, Serum: 3.4 mg/dL (04-25-23 @ 06:04)    Antibody Screen: NEG (04-25-23 @ 06:04)    4/24   BSS: +FHR 140bpm  EKG: QTc 476  CXR: neg PGY2 Note    HD: #3  GA: 8w5d    HPI: Pt seen and examined at bedside. Continues to endorse nausea and vomiting x3 yesterday. Mostly she is vomiting the water she drinks, as she has not really tried to eat. Yesterday, she did eat a little bit of rice and vomited most of it. Denies HA, CP, SOB, fevers, chills, urinary symptoms, changes in bowel habits. Denies abdominal cramping/pain, vaginal discharge, vaginal bleeding.    PAST MEDICAL & SURGICAL HISTORY:  No pertinent past medical history  H/o appendectomy    Physical Exam  Vital Signs Last 24 Hrs  T(F): 98.2 (26 Apr 2023 04:33), Max: 98.9 (26 Apr 2023 00:17)  HR: 74 (26 Apr 2023 04:33) (74 - 91)  BP: 90/60 (26 Apr 2023 04:33) (90/60 - 103/62)  RR: 18 (26 Apr 2023 04:33) (18 - 18)    Physical exam:  General - AAOx3, NAD  Abdomen - Soft, nontender, nondistended, BS+  Pelvis/Vagina - Deferred  Extremities - No calf tenderness, no swelling    Labs:                        11.6   8.18  )-----------( 258      ( 25 Apr 2023 06:04 )             33.0                         13.2   7.07  )-----------( 279      ( 24 Apr 2023 20:00 )             37.7     139  |  107  |  3  ----------------------------<70  3.7  |  18  |  <0.5    Magnesium, Serum: 2.4 mg/dL (04-25-23 @ 06:04)    Phosphorus Level, Serum: 3.4 mg/dL (04-25-23 @ 06:04)    Antibody Screen: NEG (04-25-23 @ 06:04)    4/24   BSS: +FHR 140bpm  EKG: QTc 476  CXR: neg PGY2 Note   ID [id number]    HD: #3  GA: 8w5d    HPI: Pt seen and examined at bedside. Continues to endorse nausea and vomiting x3 yesterday. Mostly she is vomiting the water she drinks, as she has not really tried to eat. Yesterday, she did eat a little bit of rice and vomited most of it. Denies HA, CP, SOB, fevers, chills, urinary symptoms, changes in bowel habits. Denies abdominal cramping/pain, vaginal discharge, vaginal bleeding.    PAST MEDICAL & SURGICAL HISTORY:  No pertinent past medical history  H/o appendectomy    Physical Exam  Vital Signs Last 24 Hrs  T(F): 98.2 (26 Apr 2023 04:33), Max: 98.9 (26 Apr 2023 00:17)  HR: 74 (26 Apr 2023 04:33) (74 - 91)  BP: 90/60 (26 Apr 2023 04:33) (90/60 - 103/62)  RR: 18 (26 Apr 2023 04:33) (18 - 18)    Physical exam:  General - AAOx3, NAD  Abdomen - Soft, nontender, nondistended, BS+  Pelvis/Vagina - Deferred  Extremities - No calf tenderness, no swelling    Labs:                        11.6   8.18  )-----------( 258      ( 25 Apr 2023 06:04 )             33.0                         13.2   7.07  )-----------( 279      ( 24 Apr 2023 20:00 )             37.7     139  |  107  |  3  ----------------------------<70  3.7  |  18  |  <0.5    Magnesium, Serum: 2.4 mg/dL (04-25-23 @ 06:04)    Phosphorus Level, Serum: 3.4 mg/dL (04-25-23 @ 06:04)    Antibody Screen: NEG (04-25-23 @ 06:04)    4/24   BSS: +FHR 140bpm  EKG: QTc 476  CXR: neg

## 2023-04-26 NOTE — PROGRESS NOTE ADULT - ASSESSMENT
28 yo  @8w5d GA with persistent hyperemesis gravidarum, for inpatient management    - vitals q4 hours  - SCDs  - daily weights  - IVF hydration: First bag of the day is multivitamin in NS with folic acid and thiamine, Maintenance fluid of LR throughout the day  - regular diet as tolerated, encouraged to separate solids from liquids   - ambulate as tolerated  - CXR neg, UCx neg  - current meds: Zofran 4mg IVP q6hr, pyridoxine 25mg PO q12hr, sucralfate 1g PO daily, pantoprazole 40mg IV q12 hr   - additional QTc prolonging antiemetics held at this time   - continue replace electrolytes as indicated; currently stable  - AM labs ordered  - FHR prior to discharge    Dr Ward and OBGYN attending to be made aware.   28 yo  @8w5d GA with persistent hyperemesis gravidarum, for inpatient management    - vitals q4 hours  - SCDs  - daily weights  - IVF hydration: First bag of the day is multivitamin in NS with folic acid and thiamine, Maintenance fluid of LR throughout the day  - regular diet as tolerated, encouraged to separate solids from liquids   - ambulate as tolerated  - CXR neg, UCx neg  - current meds: Zofran 4mg IVP q6hr, pyridoxine 25mg PO q12hr, sucralfate 1g PO daily, pantoprazole 40mg IV q12 hr   - additional QTc prolonging antiemetics held at this time  --> AM EKG ordered to re-assess QTc, if normal then may consider switching Zofran to Reglan or Compazine instead  - continue replace electrolytes as indicated; currently stable  - AM labs ordered  - FHR prior to discharge    Dr Ward and ALFONSO attending to be made aware.

## 2023-04-26 NOTE — CHART NOTE - NSCHARTNOTEFT_GEN_A_CORE
NUTRITION SUPPORT TEAM  -  CONSULT NOTE     28 yo  @8w3d by LMP (23) and first trim ze, presents to the ED with nausea and vomiting. Patient has had multiple ED visits for similar complaints. She was admitted to [facility] from - for inpatient management of hyperemesis gravidarum. She was discharged home with compazine, pepcid, b6, zofran, protonix. Patient reports she took the compazine suppository and a few zofran after discharge, her symptoms were improved for a couple of days, but then discontinued the medication. Reports persistent nausea/vomitting since then and has been unable to tolerate PO since . She was seen in the ED in the [place] for similar symptoms on  and discharge home with Keflex for a UTI, zofran, reglan, and B6. Despite these medications, she continues to endorse persistent nausea and vomiting, has been unable to tolerate PO food or hydration. Of note patient had an appendectomy 3-4 weeks ago. During her last admission, patient was found to have cholelithiasis and mildly elevated LFTs. Denies fevers, chills, diarrhea, constipation. Denies urinary symptoms. Denies abdominal pain, discharge, vaginal bleeding.     Ob/Gyn History:                   LMP - 23             Endorses h/o ovarian cysts, Denies history of uterine fibroids, abnormal paps, or STIs (2023 23:28)      NUTRITION SUPPORT NOTE:    Pt not tolerating PO, appears uncomfortable  Vomiting X3 yesterday, none today  d/w pt, RN and OB team    REVIEW OF SYSTEMS:  Negative except as noted above.       PAST MEDICAL/SURGICAL HISTORY:   No pertinent past medical history  No significant past surgical history    ALLERGIES:  No Known Allergies      VITALS:  T(F): 97.6 ( @ 12:00), Max: 98.2 ( @ 04:33)  HR: 72 ( @ 12:00) (72 - 76)  BP: 95/55 ( @ 12:00) (90/60 - 104/65)  RR: 18 ( @ 12:00) (18 - 18)  SpO2: 98% ( @ 12:00) (98% - 98%)    HEIGHT/WEIGHT/BMI:   Height (cm): 152.4 (), 152.4 (), 152.4 (), 152.4 ()  Weight (kg): 61.3 (), 59 (), 68 (-16), 71 ()  BMI (kg/m2): 26.4 (), 25.4 (), 29.3 (), 29.3 ()    PHYSICAL EXAM:   GENERAL: NAD, awake, alert  HEENT: Moist mucous membranes, Good dentition, No lesions  ABDOMEN: Soft, Nontender, Nondistended  EXTREMITIES:  No clubbing, cyanosis, or edema  SKIN: warm and well perfused; No obvious rashes or lesions  IV ACCESS: peripheral  ENTERAL ACCESS: none    I/Os:     STANDING MEDICATIONS:   fat emulsion (Fish Oil and Plant Based) 20% Infusion 1.632 Gm/kG/Day IV Continuous <Continuous>  lactated ringers. 1000 milliLiter(s) IV Continuous <Continuous>  ondansetron Injectable 4 milliGRAM(s) IV Push every 6 hours  pantoprazole  Injectable 40 milliGRAM(s) IV Push every 12 hours  Parenteral Nutrition - Adult 1 Each TPN Continuous <Continuous>  pyridoxine 25 milliGRAM(s) Oral two times a day  sodium chloride 0.9% 1000 milliLiter(s) IV Continuous <Continuous>  sucralfate 1 Gram(s) Oral <User Schedule>    LABS:                         11.2   7.90  )-----------( 227      ( 2023 06:52 )             31.5     137  |  104  |  3<L>  ----------------------------<  69<L>          (23 @ 06:52)  3.5   |  20  |  <0.5<L>    Ca    8.8          (23 @ 06:52)  Phos  3.4         (23 @ 06:52)  Mg     1.7         (23 @ 06:52)    TPro  5.7<L>  /  Alb  3.9  /  TBili  1.1  /  DBili  x   /  AST  42<H>  /  ALT  70<H>  /  AlkPhos  70       23 @ 06:52    Triglycerides, Serum: 88 mg/dL ( @ 06:52)    DIET:   Diet, Regular:   No Mixed Consistencies (23 @ 01:55) [Active]    fat emulsion (Fish Oil and Plant Based) 20% Infusion 1.632 Gm/kG/Day (31.3 mL/Hr) IV Continuous <Continuous>, 23 @ 20:00, 20:00, Stop order after: 16 Hours  Parenteral Nutrition - Adult 1 Each (85 mL/Hr) TPN Continuous <Continuous>, 23 @ 20:00, 20:00, Stop order after: 1 Days      ASSESSMENT  28 yo  @8w5d GA with persistent hyperemesis gravidarum        PLAN  - start PPN tonight  - d/c IVF's when PPN begins  - give regular diet but encourage solids without liquids, liquids should be given >30-60min after solids  - 4/27am bmp, in phos, mg  - pyridoxine 25mg PO tid  - will follow NUTRITION SUPPORT TEAM  -  CONSULT NOTE     26 yo  @8w3d by LMP (23) and first trim ze, presents to the ED with nausea and vomiting. Patient has had multiple ED visits for similar complaints. She was admitted to [facility] from - for inpatient management of hyperemesis gravidarum. She was discharged home with compazine, pepcid, b6, zofran, protonix. Patient reports she took the compazine suppository and a few zofran after discharge, her symptoms were improved for a couple of days, but then discontinued the medication. Reports persistent nausea/vomitting since then and has been unable to tolerate PO since . She was seen in the ED in the [place] for similar symptoms on  and discharge home with Keflex for a UTI, zofran, reglan, and B6. Despite these medications, she continues to endorse persistent nausea and vomiting, has been unable to tolerate PO food or hydration. Of note patient had an appendectomy 3-4 weeks ago. During her last admission, patient was found to have cholelithiasis and mildly elevated LFTs. Denies fevers, chills, diarrhea, constipation. Denies urinary symptoms. Denies abdominal pain, discharge, vaginal bleeding.     Ob/Gyn History:                   LMP - 23             Endorses h/o ovarian cysts, Denies history of uterine fibroids, abnormal paps, or STIs (2023 23:28)    NUTRITION SUPPORT NOTE:    Pt not tolerating PO, appears uncomfortable  Vomiting X3 yesterday, none today  d/w pt, RN and OB team    REVIEW OF SYSTEMS:  Negative except as noted above.     PAST MEDICAL/SURGICAL HISTORY:   No pertinent past medical history  No significant past surgical history  No Known Allergies    VITALS:  T(F): 97.6 ( @ 12:00), Max: 98.2 ( @ 04:33)  HR: 72 ( @ 12:00) (72 - 76)  BP: 95/55 ( @ 12:00) (90/60 - 104/65)  RR: 18 ( @ 12:00) (18 - 18)  SpO2: 98% ( @ 12:00) (98% - 98%)    HEIGHT/WEIGHT/BMI:   Height (cm): 152.4 (), 152.4 (), 152.4 (), 152.4 ()  Weight (kg): 61.3 (), 59 (), 68 (), 71 ()  BMI (kg/m2): 26.4 (), 25.4 (), 29.3 (), 29.3 ()    PHYSICAL EXAM:   GENERAL: NAD, awake, alert  HEENT: Moist mucous membranes, Good dentition, No lesions  ABDOMEN: Soft, Nontender, Nondistended, no NG  EXTREMITIES:  No clubbing, cyanosis, or edema  SKIN: warm and well perfused; No obvious rashes or lesions  IV ACCESS: peripheral    I/Os:     STANDING MEDICATIONS:   fat emulsion (Fish Oil and Plant Based) 20% Infusion 1.632 Gm/kG/Day IV Continuous <Continuous>  lactated ringers. 1000 milliLiter(s) IV Continuous <Continuous>  ondansetron Injectable 4 milliGRAM(s) IV Push every 6 hours  pantoprazole  Injectable 40 milliGRAM(s) IV Push every 12 hours  Parenteral Nutrition - Adult 1 Each TPN Continuous <Continuous>  pyridoxine 25 milliGRAM(s) Oral two times a day  sodium chloride 0.9% 1000 milliLiter(s) IV Continuous <Continuous>  sucralfate 1 Gram(s) Oral <User Schedule>    LABS:                       11.2   7.90  )-----------( 227      ( 2023 06:52 )             31.5     137  |  104  |  3<L>  ----------------------------<  69<L>          (23 @ 06:52)  3.5   |  20  |  <0.5<L>    Ca    8.8          (23 @ 06:52)  Phos  3.4         (23 @ 06:52)  Mg     1.7         (23 @ 06:52)    TPro  5.7<L>  /  Alb  3.9  /  TBili  1.1  /  DBili  x   /  AST  42<H>  /  ALT  70<H>  /  AlkPhos  70       23 @ 06:52    Triglycerides, Serum: 88 mg/dL ( @ 06:52)    DIET:   Diet, Regular:   No Mixed Consistencies (23 @ 01:55) [Active]    ASSESSMENT  26 yo  @8w5d GA with persistent hyperemesis gravidarum    PLAN  - start PPN tonight  - d/c IVF's when PPN begins  - give regular diet but encourage solids without liquids, liquids should be given >30-60min after solids  - am bmp, in phos, mg  - pyridoxine 25mg PO tid  - will follow

## 2023-04-27 LAB
ANION GAP SERPL CALC-SCNC: 12 MMOL/L — SIGNIFICANT CHANGE UP (ref 7–14)
B-OH-BUTYR SERPL-SCNC: 0.6 MMOL/L — HIGH
BASOPHILS # BLD AUTO: 0.03 K/UL — SIGNIFICANT CHANGE UP (ref 0–0.2)
BASOPHILS NFR BLD AUTO: 0.4 % — SIGNIFICANT CHANGE UP (ref 0–1)
BUN SERPL-MCNC: 4 MG/DL — LOW (ref 10–20)
CALCIUM SERPL-MCNC: 9.3 MG/DL — SIGNIFICANT CHANGE UP (ref 8.4–10.5)
CHLORIDE SERPL-SCNC: 105 MMOL/L — SIGNIFICANT CHANGE UP (ref 98–110)
CO2 SERPL-SCNC: 22 MMOL/L — SIGNIFICANT CHANGE UP (ref 17–32)
CREAT SERPL-MCNC: 0.5 MG/DL — LOW (ref 0.7–1.5)
EGFR: 131 ML/MIN/1.73M2 — SIGNIFICANT CHANGE UP
EOSINOPHIL # BLD AUTO: 0.12 K/UL — SIGNIFICANT CHANGE UP (ref 0–0.7)
EOSINOPHIL NFR BLD AUTO: 1.5 % — SIGNIFICANT CHANGE UP (ref 0–8)
GLUCOSE SERPL-MCNC: 107 MG/DL — HIGH (ref 70–99)
HCT VFR BLD CALC: 33.6 % — LOW (ref 37–47)
HGB BLD-MCNC: 12 G/DL — SIGNIFICANT CHANGE UP (ref 12–16)
IMM GRANULOCYTES NFR BLD AUTO: 0.3 % — SIGNIFICANT CHANGE UP (ref 0.1–0.3)
LYMPHOCYTES # BLD AUTO: 2.03 K/UL — SIGNIFICANT CHANGE UP (ref 1.2–3.4)
LYMPHOCYTES # BLD AUTO: 25.6 % — SIGNIFICANT CHANGE UP (ref 20.5–51.1)
MAGNESIUM SERPL-MCNC: 1.9 MG/DL — SIGNIFICANT CHANGE UP (ref 1.8–2.4)
MCHC RBC-ENTMCNC: 30.9 PG — SIGNIFICANT CHANGE UP (ref 27–31)
MCHC RBC-ENTMCNC: 35.7 G/DL — SIGNIFICANT CHANGE UP (ref 32–37)
MCV RBC AUTO: 86.6 FL — SIGNIFICANT CHANGE UP (ref 81–99)
MONOCYTES # BLD AUTO: 0.56 K/UL — SIGNIFICANT CHANGE UP (ref 0.1–0.6)
MONOCYTES NFR BLD AUTO: 7.1 % — SIGNIFICANT CHANGE UP (ref 1.7–9.3)
NEUTROPHILS # BLD AUTO: 5.17 K/UL — SIGNIFICANT CHANGE UP (ref 1.4–6.5)
NEUTROPHILS NFR BLD AUTO: 65.1 % — SIGNIFICANT CHANGE UP (ref 42.2–75.2)
NRBC # BLD: 0 /100 WBCS — SIGNIFICANT CHANGE UP (ref 0–0)
PHOSPHATE SERPL-MCNC: 3.1 MG/DL — SIGNIFICANT CHANGE UP (ref 2.1–4.9)
PLATELET # BLD AUTO: 239 K/UL — SIGNIFICANT CHANGE UP (ref 130–400)
PMV BLD: 11.1 FL — HIGH (ref 7.4–10.4)
POTASSIUM SERPL-MCNC: 3.4 MMOL/L — LOW (ref 3.5–5)
POTASSIUM SERPL-SCNC: 3.4 MMOL/L — LOW (ref 3.5–5)
RBC # BLD: 3.88 M/UL — LOW (ref 4.2–5.4)
RBC # FLD: 12.6 % — SIGNIFICANT CHANGE UP (ref 11.5–14.5)
SODIUM SERPL-SCNC: 139 MMOL/L — SIGNIFICANT CHANGE UP (ref 135–146)
WBC # BLD: 7.93 K/UL — SIGNIFICANT CHANGE UP (ref 4.8–10.8)
WBC # FLD AUTO: 7.93 K/UL — SIGNIFICANT CHANGE UP (ref 4.8–10.8)

## 2023-04-27 RX ORDER — ELECTROLYTE SOLUTION,INJ
1 VIAL (ML) INTRAVENOUS
Refills: 0 | Status: DISCONTINUED | OUTPATIENT
Start: 2023-04-27 | End: 2023-04-27

## 2023-04-27 RX ORDER — I.V. FAT EMULSION 20 G/100ML
1.63 EMULSION INTRAVENOUS
Qty: 100.04 | Refills: 0 | Status: DISCONTINUED | OUTPATIENT
Start: 2023-04-27 | End: 2023-04-27

## 2023-04-27 RX ADMIN — I.V. FAT EMULSION 31.3 GM/KG/DAY: 20 EMULSION INTRAVENOUS at 20:22

## 2023-04-27 RX ADMIN — ONDANSETRON 4 MILLIGRAM(S): 8 TABLET, FILM COATED ORAL at 11:57

## 2023-04-27 RX ADMIN — Medication 25 MILLIGRAM(S): at 21:15

## 2023-04-27 RX ADMIN — ONDANSETRON 4 MILLIGRAM(S): 8 TABLET, FILM COATED ORAL at 17:42

## 2023-04-27 RX ADMIN — PANTOPRAZOLE SODIUM 40 MILLIGRAM(S): 20 TABLET, DELAYED RELEASE ORAL at 17:43

## 2023-04-27 RX ADMIN — Medication 1 GRAM(S): at 05:34

## 2023-04-27 RX ADMIN — Medication 25 MILLIGRAM(S): at 05:21

## 2023-04-27 RX ADMIN — Medication 25 MILLIGRAM(S): at 18:41

## 2023-04-27 RX ADMIN — ONDANSETRON 4 MILLIGRAM(S): 8 TABLET, FILM COATED ORAL at 23:09

## 2023-04-27 RX ADMIN — Medication 1 EACH: at 20:22

## 2023-04-27 RX ADMIN — ONDANSETRON 4 MILLIGRAM(S): 8 TABLET, FILM COATED ORAL at 05:20

## 2023-04-27 RX ADMIN — Medication 25 MILLIGRAM(S): at 13:31

## 2023-04-27 RX ADMIN — PANTOPRAZOLE SODIUM 40 MILLIGRAM(S): 20 TABLET, DELAYED RELEASE ORAL at 05:20

## 2023-04-27 RX ADMIN — ONDANSETRON 4 MILLIGRAM(S): 8 TABLET, FILM COATED ORAL at 00:06

## 2023-04-27 NOTE — PROGRESS NOTE ADULT - SUBJECTIVE AND OBJECTIVE BOX
PGY2 Note   ID [id number]    HD: #4  GA: 8w6d    HPI: Pt seen and examined at bedside. Continues to endorse significant nausea with vomiting yesterday. Reports yesterday she tolerated breakfast of chicken broth. Around lunch, she had a banana and cookies and then vomited x3. No vomiting overnight. Reports her last bowel movement was Sunday and she feels slightly constipated. Denies HA, CP, SOB, fevers, chills, urinary symptoms, Denies abdominal cramping/pain, vaginal discharge, vaginal bleeding.    PAST MEDICAL & SURGICAL HISTORY:  No pertinent past medical history  H/o appendectomy    Physical Exam  Vital Signs Last 24 Hrs  T(F): 98.2 (26 Apr 2023 04:33), Max: 98.9 (26 Apr 2023 00:17)  HR: 74 (26 Apr 2023 04:33) (74 - 91)  BP: 90/60 (26 Apr 2023 04:33) (90/60 - 103/62)  RR: 18 (26 Apr 2023 04:33) (18 - 18)    Physical exam:  General - AAOx3, NAD  Abdomen - Soft, nontender, nondistended, BS+  Pelvis/Vagina - Deferred  Extremities - No calf tenderness, no swelling    Labs:                        11.6   8.18  )-----------( 258      ( 25 Apr 2023 06:04 )             33.0                         13.2   7.07  )-----------( 279      ( 24 Apr 2023 20:00 )             37.7     139  |  107  |  3  ----------------------------<70  3.7  |  18  |  <0.5    Magnesium, Serum: 2.4 mg/dL (04-25-23 @ 06:04)    Phosphorus Level, Serum: 3.4 mg/dL (04-25-23 @ 06:04)    Antibody Screen: NEG (04-25-23 @ 06:04)    4/24   BSS: +FHR 140bpm  EKG: QTc 476  CXR: neg    4/26 EKG: QTC 450ms

## 2023-04-27 NOTE — CHART NOTE - NSCHARTNOTEFT_GEN_A_CORE
Dietitian consult for decreased oral intake >3 days placed 4/25. Pt currently followed by Nutrition Support Team. Please defer to Nutrition Support Team assessment for nutrition recommendations.

## 2023-04-27 NOTE — PROGRESS NOTE ADULT - ASSESSMENT
26 yo  @8w6d GA with persistent hyperemesis gravidarum, for inpatient management    - vitals q4 hours  - SCDs  - daily weights  - IVF hydration: First bag of the day is multivitamin in NS with folic acid and thiamine, Maintenance fluid of LR throughout the day  - IV nutrition: PPN  - Nutrition consult : start PPN, pyridoxine 25mg TID  - regular diet as tolerated, encouraged to separate solids from liquids   - ambulate as tolerated  - CXR neg, UCx neg  - current meds: Zofran 4mg IVP q6hr, pyridoxine 25mg PO q8hr, sucralfate 1g PO daily, pantoprazole 40mg IV q12 hr, compazine 25mg q12hr  - continue replace electrolytes as indicated; currently stable  - AM labs ordered  - FHR prior to discharge    Dr Ward and OBGYN attending to be made aware.

## 2023-04-28 LAB
ANION GAP SERPL CALC-SCNC: 14 MMOL/L — SIGNIFICANT CHANGE UP (ref 7–14)
B-OH-BUTYR SERPL-SCNC: 0.4 MMOL/L — SIGNIFICANT CHANGE UP
BASOPHILS # BLD AUTO: 0.04 K/UL — SIGNIFICANT CHANGE UP (ref 0–0.2)
BASOPHILS NFR BLD AUTO: 0.4 % — SIGNIFICANT CHANGE UP (ref 0–1)
BUN SERPL-MCNC: 7 MG/DL — LOW (ref 10–20)
CALCIUM SERPL-MCNC: 8.9 MG/DL — SIGNIFICANT CHANGE UP (ref 8.4–10.5)
CHLORIDE SERPL-SCNC: 100 MMOL/L — SIGNIFICANT CHANGE UP (ref 98–110)
CO2 SERPL-SCNC: 20 MMOL/L — SIGNIFICANT CHANGE UP (ref 17–32)
CREAT SERPL-MCNC: <0.5 MG/DL — LOW (ref 0.7–1.5)
EGFR: 138 ML/MIN/1.73M2 — SIGNIFICANT CHANGE UP
EOSINOPHIL # BLD AUTO: 0.12 K/UL — SIGNIFICANT CHANGE UP (ref 0–0.7)
EOSINOPHIL NFR BLD AUTO: 1.1 % — SIGNIFICANT CHANGE UP (ref 0–8)
GLUCOSE SERPL-MCNC: 93 MG/DL — SIGNIFICANT CHANGE UP (ref 70–99)
HCT VFR BLD CALC: 34.6 % — LOW (ref 37–47)
HGB BLD-MCNC: 12.1 G/DL — SIGNIFICANT CHANGE UP (ref 12–16)
IMM GRANULOCYTES NFR BLD AUTO: 0.4 % — HIGH (ref 0.1–0.3)
LYMPHOCYTES # BLD AUTO: 1.94 K/UL — SIGNIFICANT CHANGE UP (ref 1.2–3.4)
LYMPHOCYTES # BLD AUTO: 17.7 % — LOW (ref 20.5–51.1)
MAGNESIUM SERPL-MCNC: 1.9 MG/DL — SIGNIFICANT CHANGE UP (ref 1.8–2.4)
MCHC RBC-ENTMCNC: 30.6 PG — SIGNIFICANT CHANGE UP (ref 27–31)
MCHC RBC-ENTMCNC: 35 G/DL — SIGNIFICANT CHANGE UP (ref 32–37)
MCV RBC AUTO: 87.6 FL — SIGNIFICANT CHANGE UP (ref 81–99)
MONOCYTES # BLD AUTO: 0.68 K/UL — HIGH (ref 0.1–0.6)
MONOCYTES NFR BLD AUTO: 6.2 % — SIGNIFICANT CHANGE UP (ref 1.7–9.3)
NEUTROPHILS # BLD AUTO: 8.13 K/UL — HIGH (ref 1.4–6.5)
NEUTROPHILS NFR BLD AUTO: 74.2 % — SIGNIFICANT CHANGE UP (ref 42.2–75.2)
NRBC # BLD: 0 /100 WBCS — SIGNIFICANT CHANGE UP (ref 0–0)
PHOSPHATE SERPL-MCNC: 4 MG/DL — SIGNIFICANT CHANGE UP (ref 2.1–4.9)
PLATELET # BLD AUTO: 224 K/UL — SIGNIFICANT CHANGE UP (ref 130–400)
PMV BLD: 12.1 FL — HIGH (ref 7.4–10.4)
POTASSIUM SERPL-MCNC: 3.4 MMOL/L — LOW (ref 3.5–5)
POTASSIUM SERPL-SCNC: 3.4 MMOL/L — LOW (ref 3.5–5)
RBC # BLD: 3.95 M/UL — LOW (ref 4.2–5.4)
RBC # FLD: 12.5 % — SIGNIFICANT CHANGE UP (ref 11.5–14.5)
SODIUM SERPL-SCNC: 134 MMOL/L — LOW (ref 135–146)
WBC # BLD: 10.95 K/UL — HIGH (ref 4.8–10.8)
WBC # FLD AUTO: 10.95 K/UL — HIGH (ref 4.8–10.8)

## 2023-04-28 PROCEDURE — 76705 ECHO EXAM OF ABDOMEN: CPT | Mod: 26

## 2023-04-28 PROCEDURE — 99222 1ST HOSP IP/OBS MODERATE 55: CPT | Mod: 25

## 2023-04-28 RX ORDER — ELECTROLYTE SOLUTION,INJ
1 VIAL (ML) INTRAVENOUS
Refills: 0 | Status: DISCONTINUED | OUTPATIENT
Start: 2023-04-28 | End: 2023-04-28

## 2023-04-28 RX ORDER — ACETAMINOPHEN 500 MG
650 TABLET ORAL EVERY 6 HOURS
Refills: 0 | Status: DISCONTINUED | OUTPATIENT
Start: 2023-04-28 | End: 2023-04-30

## 2023-04-28 RX ORDER — DIPHENHYDRAMINE HCL 50 MG
25 CAPSULE ORAL EVERY 12 HOURS
Refills: 0 | Status: DISCONTINUED | OUTPATIENT
Start: 2023-04-28 | End: 2023-04-29

## 2023-04-28 RX ORDER — I.V. FAT EMULSION 20 G/100ML
1.63 EMULSION INTRAVENOUS
Qty: 100.04 | Refills: 0 | Status: DISCONTINUED | OUTPATIENT
Start: 2023-04-28 | End: 2023-04-28

## 2023-04-28 RX ORDER — SIMETHICONE 80 MG/1
80 TABLET, CHEWABLE ORAL EVERY 4 HOURS
Refills: 0 | Status: DISCONTINUED | OUTPATIENT
Start: 2023-04-28 | End: 2023-04-30

## 2023-04-28 RX ADMIN — SIMETHICONE 80 MILLIGRAM(S): 80 TABLET, CHEWABLE ORAL at 22:28

## 2023-04-28 RX ADMIN — Medication 25 MILLIGRAM(S): at 05:36

## 2023-04-28 RX ADMIN — ONDANSETRON 4 MILLIGRAM(S): 8 TABLET, FILM COATED ORAL at 12:08

## 2023-04-28 RX ADMIN — ONDANSETRON 4 MILLIGRAM(S): 8 TABLET, FILM COATED ORAL at 17:54

## 2023-04-28 RX ADMIN — Medication 25 MILLIGRAM(S): at 17:56

## 2023-04-28 RX ADMIN — SIMETHICONE 80 MILLIGRAM(S): 80 TABLET, CHEWABLE ORAL at 17:55

## 2023-04-28 RX ADMIN — PANTOPRAZOLE SODIUM 40 MILLIGRAM(S): 20 TABLET, DELAYED RELEASE ORAL at 17:54

## 2023-04-28 RX ADMIN — Medication 650 MILLIGRAM(S): at 10:09

## 2023-04-28 RX ADMIN — PANTOPRAZOLE SODIUM 40 MILLIGRAM(S): 20 TABLET, DELAYED RELEASE ORAL at 05:36

## 2023-04-28 RX ADMIN — Medication 1 EACH: at 20:53

## 2023-04-28 RX ADMIN — SIMETHICONE 80 MILLIGRAM(S): 80 TABLET, CHEWABLE ORAL at 10:04

## 2023-04-28 RX ADMIN — Medication 25 MILLIGRAM(S): at 17:54

## 2023-04-28 RX ADMIN — ONDANSETRON 4 MILLIGRAM(S): 8 TABLET, FILM COATED ORAL at 05:36

## 2023-04-28 RX ADMIN — SIMETHICONE 80 MILLIGRAM(S): 80 TABLET, CHEWABLE ORAL at 14:07

## 2023-04-28 RX ADMIN — Medication 25 MILLIGRAM(S): at 14:06

## 2023-04-28 RX ADMIN — I.V. FAT EMULSION 31.3 GM/KG/DAY: 20 EMULSION INTRAVENOUS at 20:53

## 2023-04-28 RX ADMIN — Medication 25 MILLIGRAM(S): at 22:27

## 2023-04-28 RX ADMIN — Medication 1 GRAM(S): at 08:17

## 2023-04-28 RX ADMIN — Medication 25 MILLIGRAM(S): at 05:35

## 2023-04-28 RX ADMIN — Medication 25 MILLIGRAM(S): at 10:04

## 2023-04-28 RX ADMIN — Medication 650 MILLIGRAM(S): at 14:09

## 2023-04-28 NOTE — CONSULT NOTE ADULT - SUBJECTIVE AND OBJECTIVE BOX
Gastroenterology Consultation:    Patient is a 27y old  Female who presents with a chief complaint of hyperemesis gravidarum (2023 06:24)        Admitted on: 23      HPI:    26 yo  @8w3d by LMP (23) and Hyperemesis Gravidarum presents to the ED with nausea and vomiting. Patient has had multiple ED visits for similar complaints. She was admitted to [facility] from - for inpatient management of hyperemesis gravidarum. She was discharged home with compazine, pepcid, b6, zofran, protonix. Patient reports she took the compazine suppository and a few zofran after discharge, her symptoms were improved for a couple of days, but then discontinued the medication. Reports persistent nausea/vomitting since then and has been unable to tolerate PO since . She was seen in the ED in the [place] for similar symptoms on  and discharge home with Keflex for a UTI, zofran, reglan, and B6. Despite these medications, she continues to endorse persistent nausea and vomiting, has been unable to tolerate PO food or hydration. Of note patient had an appendectomy 3-4 weeks ago. During her last admission, patient was found to have mildly elevated LFTs. Denies fevers, chills, diarrhea, constipation. Denies urinary symptoms. Denies abdominal pain, discharge, vaginal bleeding.       Prior EGD: none     Prior  Colonoscopy: none      PAST MEDICAL & SURGICAL HISTORY:  No pertinent past medical history      No significant past surgical history            FAMILY HISTORY:  No pertinent family history in first degree relatives        Social History:  Tobacco: denies   Alcohol: denies   Drugs: denies    Home Medications:        MEDICATIONS  (STANDING):  diphenhydrAMINE Injectable 25 milliGRAM(s) IV Push every 12 hours  fat emulsion (Fish Oil and Plant Based) 20% Infusion 1.632 Gm/kG/Day (31.3 mL/Hr) IV Continuous <Continuous>  ondansetron Injectable 4 milliGRAM(s) IV Push every 6 hours  pantoprazole  Injectable 40 milliGRAM(s) IV Push every 12 hours  Parenteral Nutrition - Adult 1 Each (85 mL/Hr) TPN Continuous <Continuous>  prochlorperazine Suppository 25 milliGRAM(s) Rectal every 12 hours  pyridoxine 25 milliGRAM(s) Oral <User Schedule>  simethicone 80 milliGRAM(s) Chew every 4 hours  sucralfate 1 Gram(s) Oral <User Schedule>    MEDICATIONS  (PRN):  acetaminophen     Tablet .. 650 milliGRAM(s) Oral every 6 hours PRN Mild Pain (1 - 3), Moderate Pain (4 - 6)      Allergies  No Known Allergies      Review of Systems:   Constitutional:  No Fever, No Chills  ENT/Mouth:  No Hearing Changes,  No Difficulty Swallowing  Eyes:  No Eye Pain, No Vision Changes  Cardiovascular:  No Chest Pain, No Palpitations  Respiratory:  No Cough, No Dyspnea  Gastrointestinal:  As described in HPI          Physical Examination:  T(C): 36.9 (23 @ 08:35), Max: 36.9 (23 @ 20:39)  HR: 89 (23 @ 08:35) (87 - 99)  BP: 97/57 (23 @ 08:35) (90/53 - 109/59)  RR: 18 (23 @ 08:35) (18 - 18)  SpO2: 98% (23 @ 08:35) (97% - 98%)      23 @ 07:01  -  23 @ 07:00  --------------------------------------------------------  IN: 2304.1 mL / OUT: 0 mL / NET: 2304.1 mL    23 @ 07:  -  23 @ 07:00  --------------------------------------------------------  IN: 1561.5 mL / OUT: 0 mL / NET: 1561.5 mL    23 @ 07:01  -  23 @ 12:20  --------------------------------------------------------  IN: 528.9 mL / OUT: 300 mL / NET: 228.9 mL          GENERAL: AAOx3, no acute distress.  HEAD:  Atraumatic, Normocephalic  EYES: conjunctiva and sclera clear  CHEST/LUNG: Clear to auscultation bilaterally; No wheeze, rhonchi, or rales  HEART: Regular rate and rhythm; normal S1, S2, No murmurs.  ABDOMEN: Soft, nontender, ; Bowel sounds present  SKIN: Intact, no jaundice        Data:                        12.1   10 )-----------( 224      ( 2023 06:01 )             34.6     Hgb Trend:  12.1  23 @ 06:01  12.0  23 @ 05:44  11.2  23 @ 06:52            134<L>  |  100  |  7<L>  ----------------------------<  93  3.4<L>   |  20  |  <0.5<L>    Ca    8.9      2023 06:01  Phos  4.0       Mg     1.9           Liver panel trend:  TBili 1.1   /   AST 42   /   ALT 70   /   AlkP 70   /   Tptn 5.7   /   Alb 3.9    /   DBili --        TBili 0.9   /   AST 34   /   ALT 69   /   AlkP 71   /   Tptn 6.0   /   Alb 4.0    /   DBili --        TBili 0.8   /   AST 50   /   ALT 95   /   AlkP 79   /   Tptn 7.0   /   Alb 4.5    /   DBili --        TBili 1.2   /   AST 75   /      /   AlkP 94   /   Tptn 7.5   /   Alb 4.9    /   DBili --                    Radiology:

## 2023-04-28 NOTE — PROGRESS NOTE ADULT - SUBJECTIVE AND OBJECTIVE BOX
PGY1 Note   ID [id number]    HD: #5  GA: 9w0d    HPI: Pt seen and examined at bedside. Continues to endorse significant nausea with vomiting yesterday. Reports she tolerated no food yesterday. Denies HA, CP, SOB, fevers, chills, urinary symptoms, Denies abdominal cramping/pain, vaginal discharge, vaginal bleeding.    PAST MEDICAL & SURGICAL HISTORY:  No pertinent past medical history  H/o appendectomy    Physical Exam  Vital Signs Last 24 Hrs  T(C): 36.5 (28 Apr 2023 16:51), Max: 36.9 (27 Apr 2023 20:39)  T(F): 97.7 (28 Apr 2023 16:51), Max: 98.5 (27 Apr 2023 20:39)  HR: 89 (28 Apr 2023 16:51) (80 - 99)  BP: 103/66 (28 Apr 2023 16:51) (88/53 - 109/59)  RR: 18 (28 Apr 2023 16:51) (18 - 18)  SpO2: 97% (28 Apr 2023 16:51) (97% - 98%)    Physical exam:  General - AAOx3, NAD  Abdomen - Soft, nontender, nondistended, BS+  Pelvis/Vagina - Deferred  Extremities - No calf tenderness, no swelling    Labs:  4/24 7.07>13.2/37.7<279, 138/3.3/105/19/4/<0.5<90, AST/ALT 50/95, beta hydroxy 1.3,  VBG lactate 0.9, UA large ketone  4/25 8.18>11.6/33.0<258, 139/3.7/107/18/3/0.5<70, AST/ALT 71/34, Mg 2.4, Phos 3.4, beta hydroxy butyrate 1.1, O pos  4/26 7.9>11.2/31.5<227, 137/3.5/104/20/3/<0.5<69, AST/ALT 42/70, Mg 1.7, P 3.4, beta hydroxybutyrate 1.4, triglycerides 88  4/27 7.93>12/33.6<239, 139/3.4/105/22/4/0.5<107, P 3.1, Mg 1.9, beta hydroxy 0.6  4/28 10.95>12.1/34.6<224, 134/3.4/100/20/7/0.5<93, Mg 1.9, phos 4, beta hydroxy butyrate 0.4    4/24   BSS: +FHR 140bpm  EKG: QTc 476  CXR: neg    4/26 EKG: QTC 450ms

## 2023-04-28 NOTE — PROGRESS NOTE ADULT - ASSESSMENT
26 yo  @9w0d GA with persistent hyperemesis gravidarum, for inpatient management    - vitals q4 hours  - SCDs  - daily weights  - IVF hydration: First bag of the day is multivitamin in NS with folic acid and thiamine, Maintenance fluid of LR throughout the day  - IV nutrition: PPN  - Nutrition consult : start PPN, pyridoxine 25mg TID  - regular diet as tolerated, encouraged to separate solids from liquids   - ambulate as tolerated  - CXR neg, UCx neg  - current meds: Zofran 4mg IVP q6hr, pyridoxine 25mg PO q8hr, sucralfate 1g PO daily, pantoprazole 40mg IV q12 hr, compazine 25mg q12hr  - continue replace electrolytes as indicated; currently stable  - AM labs ordered  - FHR prior to discharge    Dr. Ge and OBGYN attending to be made aware.

## 2023-04-28 NOTE — PROGRESS NOTE ADULT - SUBJECTIVE AND OBJECTIVE BOX
NUTRITION SUPPORT TEAM  -  PROGRESS NOTE     Interval Events:  weak, alert, verbal  skin turgor ok, mouth dry  PPN and lipids infusing  + nausea, no vomiting today  per RN pt ate a few spoons of yogurt, stopped when she felt nauseated    VITALS:  T(F): 97.9 (04-28 @ 12:36), Max: 98.5 (04-28 @ 08:35)  HR: 80 (04-28 @ 12:36) (80 - 89)  BP: 88/53 (04-28 @ 12:36) (88/53 - 98/53)  RR: 18 (04-28 @ 12:36) (18 - 18)  SpO2: 98% (04-28 @ 12:36) (97% - 98%)    HEIGHT/WEIGHT/BMI:   Height (cm): 152.4 (04-24), 152.4 (04-23), 152.4 (04-14), 152.4 (04-01)  Weight (kg): 61.3 (04-24), 59 (04-23), 68 (03-16), 71 (03-03)  BMI (kg/m2): 26.4 (04-24), 25.4 (04-23), 29.3 (04-14), 29.3 (04-01)    I/Os:     04-27-23 @ 07:01  -  04-28-23 @ 07:00  --------------------------------------------------------  IN:    Fat Emulsion (Fish Oil &amp; Plant Based) 20% Infusion: 156.5 mL    Oral Fluid: 470 mL    PPN (Peripheral Parenteral Nutrition): 935 mL  Total IN: 1561.5 mL    OUT:  Total OUT: 0 mL--- pt goes to bathroom to urinate    Total NET: 1561.5 mL    STANDING MEDICATIONS:   diphenhydrAMINE Injectable 25 milliGRAM(s) IV Push every 12 hours  fat emulsion (Fish Oil and Plant Based) 20% Infusion 1.632 Gm/kG/Day IV Continuous <Continuous>  ondansetron Injectable 4 milliGRAM(s) IV Push every 6 hours  pantoprazole  Injectable 40 milliGRAM(s) IV Push every 12 hours  Parenteral Nutrition - Adult 1 Each TPN Continuous <Continuous>  prochlorperazine Suppository 25 milliGRAM(s) Rectal every 12 hours  pyridoxine 25 milliGRAM(s) Oral <User Schedule>  simethicone 80 milliGRAM(s) Chew every 4 hours  sucralfate 1 Gram(s) Oral <User Schedule>    LABS:                         12.1   10.95 )-----------( 224      ( 28 Apr 2023 06:01 )             34.6     134<L>  |  100  |  7<L>  ----------------------------<  93          (04-28-23 @ 06:01)  3.4<L>   |  20  |  <0.5<L>    Ca    8.9          (04-28-23 @ 06:01)  Phos  4.0         (04-28-23 @ 06:01)  Mg     1.9         (04-28-23 @ 06:01)    Triglycerides, Serum: 88 mg/dL (04-26 @ 06:52)    DIET:   Diet, Regular:   No Mixed Consistencies (04-25-23 @ 01:55) [Active]

## 2023-04-28 NOTE — CONSULT NOTE ADULT - ASSESSMENT
26 yo  @8w3d by LMP (23) and Hyperemesis Gravidarum presents to the ED with nausea and vomiting. Patient has had multiple ED visits for similar complaints. She was admitted to [facility] from - for inpatient management of hyperemesis gravidarum. She was discharged home with compazine, pepcid, b6, zofran, protonix. Patient reports she took the compazine suppository and a few zofran after discharge, her symptoms were improved for a couple of days, but then discontinued the medication. Reports persistent nausea/vomitting since then and has been unable to tolerate PO since . She was seen in the ED in the [place] for similar symptoms on  and discharge home with Keflex for a UTI, zofran, reglan, and B6. Despite these medications, she continues to endorse persistent nausea and vomiting, has been unable to tolerate PO food or hydration. Of note patient had an appendectomy 3-4 weeks ago. During her last admission, patient was found to have  mildly elevated LFTs.  GI was consulted for management    #Hyperemesis Gravidarum  #Mild hepatocellular liver injury - likely NAFLD - r/o other etiologies  - electrolyte abnormalities  - CTAP on 3/23 - Fatty liver disease. no gallstones.  - sono 3/23 - fatty liver. intact GB. cbd 4mm and no stones - clinically asymptomatic  - no alarm GI symptoms    Plan  - NPO for 24 hours  - C/w pyridoxine 20mg PO Q8h  - switch carafate to 1g QID  - protonix IV 40mg BID  - c/w Zofran 4mg IV TID - monitor QTC especially in context of electrolyte abnormality - target K>4, Mg>2 and PO4>1  - can consider benadryl 20mg Q4-q6h  - If all above measures fail pt may required short trial therapy with Solumedrol 16mg Q8h for 48 hours  - IF nausea and vomiting improves on above regimen can advance diet slowly to full liquids. If tolerates that then can advance to BRAT diet - Bread, rice, apple sauce and TOAST   26 yo  @8w3d by LMP (23) and Hyperemesis Gravidarum presents to the ED with nausea and vomiting. Patient has had multiple ED visits for similar complaints. She was admitted to [facility] from - for inpatient management of hyperemesis gravidarum. She was discharged home with compazine, pepcid, b6, zofran, protonix. Patient reports she took the compazine suppository and a few zofran after discharge, her symptoms were improved for a couple of days, but then discontinued the medication. Reports persistent nausea/vomitting since then and has been unable to tolerate PO since . She was seen in the ED in the [place] for similar symptoms on  and discharge home with Keflex for a UTI, zofran, reglan, and B6. Despite these medications, she continues to endorse persistent nausea and vomiting, has been unable to tolerate PO food or hydration. Of note patient had an appendectomy 3-4 weeks ago. During her last admission, patient was found to have  mildly elevated LFTs.  GI was consulted for management    #Hyperemesis Gravidarum  #Mild hepatocellular liver injury - likely NAFLD - r/o other etiologies  - electrolyte abnormalities  - CTAP on 3/23 - Fatty liver disease. no gallstones.  - sono 3/23 - fatty liver. intact GB. cbd 4mm and no stones - clinically asymptomatic  - no alarm GI symptoms    Plan  - NPO for 24 hours  - C/w pyridoxine 20mg PO Q8h  - switch carafate to 1g QID  - protonix IV 40mg BID  - c/w Zofran 4mg IV TID - monitor QTC especially in context of electrolyte abnormality - target K>4, Mg>2 and PO4>1  - can consider benadryl 20mg Q4-q6h  - If all above measures fail pt may required short trial therapy with Solumedrol 16mg Q8h for 48 hours  - IF nausea and vomiting improves on above regimen can advance diet slowly to full liquids. If tolerates that then can advance to BRAT diet - Bread, rice, apple sauce and TOAST  - Please send Hepatitis A IgM, Hepatitis B core IgM, core Ab total, surface Ag, surface Ab, HCV antibody, HCV RNA, Anti HEV  - Please send Iron studies  - Please send VELMA, AMA, anti-Smooth Muscle Ab type 1, Anti liver-kidney microsomal Ab, Anti-soluble liver Ag, immunoglobulin panel, A1AT phenotype  - Please send CMV PCR, EBV PCR, HSV IgM  - Please send Serum Drug screen and Utox  - Please check RUQ sono with Doppler  - Please avoid hepatotoxic medications  - Monitor LFTs and INR daily

## 2023-04-28 NOTE — PROGRESS NOTE ADULT - ASSESSMENT
{\rtf1\jvyldz99664\ansi\ueijsjo6309\ftnbj\uc1\deff0  {\fonttbl{\f0 \fnil Segoe UI;}{\f1 \fnil \fcharset0 Segoe UI;}{\f2 \fnil Times New Dino;}}  {\colortbl ;\zrk510\ukzxd486\scwc190 ;\red0\green0\blue0 ;\red0\green0\hcfn597 ;\red0\green0\blue0 ;}  {\stylesheet{\f0\fs20 Normal;}{\cs1 Default Paragraph Font;}{\cs2\f0\fs16 Line Number;}{\cs3\f2\fs24\ul\cf3 Hyperlink;}}  {\*\revtbl{Unknown;}}  \oexjzx94165\hdanei57444\xznzc3823\qicht4773\dzhrv4610\fjxpe8460\oolbuqc053\spfoppe115\nogrowautofit\odpdpp281\formshade\nofeaturethrottle1\dntblnsbdb\fet4\aendnotes\aftnnrlc\pgbrdrhead\pgbrdrfoot  \sectd\xyegbb30576\pytkyh99864\guttersxn0\dmpcipzv2520\mcqslhkf3279\haxqmwgi2936\npbrqhav6480\hxbslda620\ewiiyye278\sbkpage\pgncont\pgndec  \plain\plain\f0\fs24\ql\plain\f0\fs24\plain\f0\fs20\gxie1692\hich\f0\dbch\f0\loch\f0\fs20 28 yo  @8w5d GA with persistent hyperemesis gravidarum\par  \par  PLAN\par  - cont PPN \par  - give regular diet but encourage solids without liquids, liquids should be given >30-60min after solids\par      ELISSA diet not indicated, as pt does not have diarrhea, and she \plain\f1\fs20\kgzz7073\hich\f1\dbch\f1\loch\f1\cf2\fs20\ul does\plain\f0\fs20\vhyp7149\hich\f0\dbch\f0\loch\f0\fs20  need protein\par      when resuming diet, give "dry hyperemesis diet" as d/w team\par  - pyridoxine 25mg PO tid\par  - will follow.\plain\f1\fs20\aesj7114\hich\f1\dbch\f1\loch\f1\cf2\fs20\strike\plain\f1\fs20\mfej4172\hich\f1\dbch\f1\loch\f1\cf2\fs20\plain\f0\fs20\vruj9923\hich\f0\dbch\f0\loch\f0\fs20\par  }

## 2023-04-29 ENCOUNTER — TRANSCRIPTION ENCOUNTER (OUTPATIENT)
Age: 28
End: 2023-04-29

## 2023-04-29 LAB
ALBUMIN SERPL ELPH-MCNC: 4.2 G/DL — SIGNIFICANT CHANGE UP (ref 3.5–5.2)
ALP SERPL-CCNC: 80 U/L — SIGNIFICANT CHANGE UP (ref 30–115)
ALT FLD-CCNC: 56 U/L — HIGH (ref 0–41)
ANION GAP SERPL CALC-SCNC: 15 MMOL/L — HIGH (ref 7–14)
AST SERPL-CCNC: 25 U/L — SIGNIFICANT CHANGE UP (ref 0–41)
B-OH-BUTYR SERPL-SCNC: 0.6 MMOL/L — HIGH
BASOPHILS # BLD AUTO: 0.03 K/UL — SIGNIFICANT CHANGE UP (ref 0–0.2)
BASOPHILS NFR BLD AUTO: 0.3 % — SIGNIFICANT CHANGE UP (ref 0–1)
BILIRUB SERPL-MCNC: 0.6 MG/DL — SIGNIFICANT CHANGE UP (ref 0.2–1.2)
BUN SERPL-MCNC: 7 MG/DL — LOW (ref 10–20)
CALCIUM SERPL-MCNC: 9.2 MG/DL — SIGNIFICANT CHANGE UP (ref 8.4–10.5)
CHLORIDE SERPL-SCNC: 105 MMOL/L — SIGNIFICANT CHANGE UP (ref 98–110)
CMV IGG FLD QL: 5.2 U/ML — HIGH
CMV IGG SERPL-IMP: POSITIVE
CMV IGM FLD-ACNC: <8 AU/ML — SIGNIFICANT CHANGE UP
CMV IGM SERPL QL: NEGATIVE — SIGNIFICANT CHANGE UP
CO2 SERPL-SCNC: 18 MMOL/L — SIGNIFICANT CHANGE UP (ref 17–32)
CREAT SERPL-MCNC: <0.5 MG/DL — LOW (ref 0.7–1.5)
EBV EA AB SER IA-ACNC: <5 U/ML — SIGNIFICANT CHANGE UP
EBV EA AB TITR SER IF: POSITIVE
EBV EA IGG SER-ACNC: NEGATIVE — SIGNIFICANT CHANGE UP
EBV NA IGG SER IA-ACNC: 428 U/ML — HIGH
EBV PATRN SPEC IB-IMP: SIGNIFICANT CHANGE UP
EBV VCA IGG AVIDITY SER QL IA: NEGATIVE — SIGNIFICANT CHANGE UP
EBV VCA IGM SER IA-ACNC: 14 U/ML — SIGNIFICANT CHANGE UP
EBV VCA IGM SER IA-ACNC: <10 U/ML — SIGNIFICANT CHANGE UP
EBV VCA IGM TITR FLD: NEGATIVE — SIGNIFICANT CHANGE UP
EGFR: 138 ML/MIN/1.73M2 — SIGNIFICANT CHANGE UP
EOSINOPHIL # BLD AUTO: 0.09 K/UL — SIGNIFICANT CHANGE UP (ref 0–0.7)
EOSINOPHIL NFR BLD AUTO: 1 % — SIGNIFICANT CHANGE UP (ref 0–8)
FERRITIN SERPL-MCNC: 44 NG/ML — SIGNIFICANT CHANGE UP (ref 15–150)
GLUCOSE SERPL-MCNC: 82 MG/DL — SIGNIFICANT CHANGE UP (ref 70–99)
HAV IGM SER-ACNC: SIGNIFICANT CHANGE UP
HBV CORE IGM SER-ACNC: SIGNIFICANT CHANGE UP
HBV SURFACE AG SER-ACNC: SIGNIFICANT CHANGE UP
HCT VFR BLD CALC: 35.4 % — LOW (ref 37–47)
HCV AB S/CO SERPL IA: 0.08 S/CO — SIGNIFICANT CHANGE UP (ref 0–0.99)
HCV AB SERPL-IMP: SIGNIFICANT CHANGE UP
HGB BLD-MCNC: 12.4 G/DL — SIGNIFICANT CHANGE UP (ref 12–16)
IGA FLD-MCNC: 150 MG/DL — SIGNIFICANT CHANGE UP (ref 84–499)
IGG FLD-MCNC: 656 MG/DL — SIGNIFICANT CHANGE UP (ref 610–1660)
IGM SERPL-MCNC: 130 MG/DL — SIGNIFICANT CHANGE UP (ref 35–242)
IMM GRANULOCYTES NFR BLD AUTO: 0.3 % — SIGNIFICANT CHANGE UP (ref 0.1–0.3)
IRON SATN MFR SERPL: 13 % — LOW (ref 15–50)
IRON SATN MFR SERPL: 49 UG/DL — SIGNIFICANT CHANGE UP (ref 35–150)
KAPPA LC SER QL IFE: 0.97 MG/DL — SIGNIFICANT CHANGE UP (ref 0.33–1.94)
KAPPA/LAMBDA FREE LIGHT CHAIN RATIO, SERUM: 0.98 RATIO — SIGNIFICANT CHANGE UP (ref 0.26–1.65)
LAMBDA LC SER QL IFE: 0.99 MG/DL — SIGNIFICANT CHANGE UP (ref 0.57–2.63)
LYMPHOCYTES # BLD AUTO: 2.44 K/UL — SIGNIFICANT CHANGE UP (ref 1.2–3.4)
LYMPHOCYTES # BLD AUTO: 25.9 % — SIGNIFICANT CHANGE UP (ref 20.5–51.1)
MAGNESIUM SERPL-MCNC: 1.8 MG/DL — SIGNIFICANT CHANGE UP (ref 1.8–2.4)
MCHC RBC-ENTMCNC: 30.5 PG — SIGNIFICANT CHANGE UP (ref 27–31)
MCHC RBC-ENTMCNC: 35 G/DL — SIGNIFICANT CHANGE UP (ref 32–37)
MCV RBC AUTO: 87.2 FL — SIGNIFICANT CHANGE UP (ref 81–99)
MONOCYTES # BLD AUTO: 0.58 K/UL — SIGNIFICANT CHANGE UP (ref 0.1–0.6)
MONOCYTES NFR BLD AUTO: 6.2 % — SIGNIFICANT CHANGE UP (ref 1.7–9.3)
NEUTROPHILS # BLD AUTO: 6.25 K/UL — SIGNIFICANT CHANGE UP (ref 1.4–6.5)
NEUTROPHILS NFR BLD AUTO: 66.3 % — SIGNIFICANT CHANGE UP (ref 42.2–75.2)
NRBC # BLD: 0 /100 WBCS — SIGNIFICANT CHANGE UP (ref 0–0)
PHOSPHATE SERPL-MCNC: 4.7 MG/DL — SIGNIFICANT CHANGE UP (ref 2.1–4.9)
PLATELET # BLD AUTO: 217 K/UL — SIGNIFICANT CHANGE UP (ref 130–400)
PMV BLD: 11.5 FL — HIGH (ref 7.4–10.4)
POTASSIUM SERPL-MCNC: 3.4 MMOL/L — LOW (ref 3.5–5)
POTASSIUM SERPL-SCNC: 3.4 MMOL/L — LOW (ref 3.5–5)
PROT SERPL-MCNC: 6.3 G/DL — SIGNIFICANT CHANGE UP (ref 6–8)
RBC # BLD: 4.06 M/UL — LOW (ref 4.2–5.4)
RBC # FLD: 12.8 % — SIGNIFICANT CHANGE UP (ref 11.5–14.5)
SODIUM SERPL-SCNC: 138 MMOL/L — SIGNIFICANT CHANGE UP (ref 135–146)
TIBC SERPL-MCNC: 364 UG/DL — SIGNIFICANT CHANGE UP (ref 220–430)
UIBC SERPL-MCNC: 315 UG/DL — SIGNIFICANT CHANGE UP (ref 110–370)
WBC # BLD: 9.42 K/UL — SIGNIFICANT CHANGE UP (ref 4.8–10.8)
WBC # FLD AUTO: 9.42 K/UL — SIGNIFICANT CHANGE UP (ref 4.8–10.8)

## 2023-04-29 PROCEDURE — 93010 ELECTROCARDIOGRAM REPORT: CPT

## 2023-04-29 PROCEDURE — 99232 SBSQ HOSP IP/OBS MODERATE 35: CPT | Mod: GC

## 2023-04-29 RX ORDER — PYRIDOXINE HCL (VITAMIN B6) 100 MG
1 TABLET ORAL
Qty: 90 | Refills: 3
Start: 2023-04-29 | End: 2023-08-26

## 2023-04-29 RX ORDER — ONDANSETRON 8 MG/1
4 TABLET, FILM COATED ORAL EVERY 6 HOURS
Refills: 0 | Status: DISCONTINUED | OUTPATIENT
Start: 2023-04-29 | End: 2023-04-30

## 2023-04-29 RX ORDER — DIPHENHYDRAMINE HCL 50 MG
25 CAPSULE ORAL EVERY 12 HOURS
Refills: 0 | Status: DISCONTINUED | OUTPATIENT
Start: 2023-04-29 | End: 2023-04-29

## 2023-04-29 RX ORDER — SUCRALFATE 1 G
1 TABLET ORAL
Qty: 30 | Refills: 3
Start: 2023-04-29 | End: 2023-08-26

## 2023-04-29 RX ORDER — PANTOPRAZOLE SODIUM 20 MG/1
40 TABLET, DELAYED RELEASE ORAL
Refills: 0 | Status: DISCONTINUED | OUTPATIENT
Start: 2023-04-29 | End: 2023-04-30

## 2023-04-29 RX ORDER — PROCHLORPERAZINE MALEATE 5 MG
1 TABLET ORAL
Qty: 60 | Refills: 3
Start: 2023-04-29 | End: 2023-08-26

## 2023-04-29 RX ORDER — PANTOPRAZOLE SODIUM 20 MG/1
1 TABLET, DELAYED RELEASE ORAL
Qty: 30 | Refills: 3
Start: 2023-04-29 | End: 2023-08-26

## 2023-04-29 RX ORDER — ONDANSETRON 8 MG/1
1 TABLET, FILM COATED ORAL
Qty: 120 | Refills: 3
Start: 2023-04-29 | End: 2023-08-26

## 2023-04-29 RX ORDER — ONDANSETRON 8 MG/1
4 TABLET, FILM COATED ORAL EVERY 6 HOURS
Refills: 0 | Status: DISCONTINUED | OUTPATIENT
Start: 2023-04-29 | End: 2023-04-29

## 2023-04-29 RX ORDER — DIPHENHYDRAMINE HCL 50 MG
25 CAPSULE ORAL EVERY 12 HOURS
Refills: 0 | Status: DISCONTINUED | OUTPATIENT
Start: 2023-04-29 | End: 2023-04-30

## 2023-04-29 RX ADMIN — Medication 25 MILLIGRAM(S): at 21:13

## 2023-04-29 RX ADMIN — Medication 25 MILLIGRAM(S): at 05:39

## 2023-04-29 RX ADMIN — SIMETHICONE 80 MILLIGRAM(S): 80 TABLET, CHEWABLE ORAL at 09:45

## 2023-04-29 RX ADMIN — SIMETHICONE 80 MILLIGRAM(S): 80 TABLET, CHEWABLE ORAL at 02:27

## 2023-04-29 RX ADMIN — SIMETHICONE 80 MILLIGRAM(S): 80 TABLET, CHEWABLE ORAL at 05:40

## 2023-04-29 RX ADMIN — Medication 25 MILLIGRAM(S): at 05:40

## 2023-04-29 RX ADMIN — SIMETHICONE 80 MILLIGRAM(S): 80 TABLET, CHEWABLE ORAL at 17:41

## 2023-04-29 RX ADMIN — SIMETHICONE 80 MILLIGRAM(S): 80 TABLET, CHEWABLE ORAL at 13:52

## 2023-04-29 RX ADMIN — Medication 25 MILLIGRAM(S): at 13:51

## 2023-04-29 RX ADMIN — Medication 1 GRAM(S): at 08:49

## 2023-04-29 RX ADMIN — ONDANSETRON 4 MILLIGRAM(S): 8 TABLET, FILM COATED ORAL at 00:09

## 2023-04-29 RX ADMIN — ONDANSETRON 4 MILLIGRAM(S): 8 TABLET, FILM COATED ORAL at 05:40

## 2023-04-29 RX ADMIN — Medication 25 MILLIGRAM(S): at 17:41

## 2023-04-29 RX ADMIN — SIMETHICONE 80 MILLIGRAM(S): 80 TABLET, CHEWABLE ORAL at 21:13

## 2023-04-29 RX ADMIN — Medication 650 MILLIGRAM(S): at 02:57

## 2023-04-29 RX ADMIN — PANTOPRAZOLE SODIUM 40 MILLIGRAM(S): 20 TABLET, DELAYED RELEASE ORAL at 05:41

## 2023-04-29 RX ADMIN — Medication 650 MILLIGRAM(S): at 02:27

## 2023-04-29 RX ADMIN — ONDANSETRON 4 MILLIGRAM(S): 8 TABLET, FILM COATED ORAL at 17:41

## 2023-04-29 RX ADMIN — ONDANSETRON 4 MILLIGRAM(S): 8 TABLET, FILM COATED ORAL at 11:58

## 2023-04-29 NOTE — PROVIDER CONTACT NOTE (OTHER) - ASSESSMENT
Pt states she can not move arm but appears to have full ROM and able to move fingers. IV site shows no signs of infiltration and has blood return.
IV site shows no signs of infiltration.

## 2023-04-29 NOTE — DISCHARGE NOTE ANTEPARTUM - PLAN OF CARE
Please  your medications and take them as directed. carafate 1 tableta esmer vez al día  miralax esmer vez al maddie  ondansetrón (zofran) cada 6 horas  pantoprazol 40 mg esmer tableta esmer vez al día  proclorperazina (compazine) 25 mg por vía rectal cada 12 horas (dos veces al día)  piridoxina (B6) 25 mg 1 tableta cada 8 horas (3 veces al día

## 2023-04-29 NOTE — DISCHARGE NOTE ANTEPARTUM - MEDICATION SUMMARY - MEDICATIONS TO TAKE
I will START or STAY ON the medications listed below when I get home from the hospital:    prochlorperazine 25 mg rectal suppository  -- 1 suppository(ies) rectally every 12 hours  -- Indication: For nausea and vomiting    ondansetron 4 mg oral tablet  -- 1 tab(s) by mouth every 6 hours  -- Indication: For nausea and vomiting    sucralfate 1 g oral tablet  -- 1 tab(s) by mouth once a day  -- Indication: For nausea and vomiting    pantoprazole 40 mg oral delayed release tablet  -- 1 tab(s) by mouth once a day (before a meal)  -- Indication: For nausea and vomiting    pyridoxine 25 mg oral tablet  -- 1 tab(s) by mouth 3 times a day  -- Indication: For nausea and vomiting   I will START or STAY ON the medications listed below when I get home from the hospital:    prochlorperazine 25 mg rectal suppository  -- 1 suppository(ies) rectally every 12 hours  -- Indication: For nausea and vomiting    ondansetron 4 mg oral tablet  -- 1 tab(s) by mouth every 6 hours  -- Indication: For nausea and vomiting    MiraLax oral powder for reconstitution  -- 17 gram(s) by mouth once a day  -- Indication: For constipation    sucralfate 1 g oral tablet  -- 1 tab(s) by mouth once a day  -- Indication: For nausea and vomiting    pantoprazole 40 mg oral delayed release tablet  -- 1 tab(s) by mouth once a day (before a meal)  -- Indication: For nausea and vomiting    pyridoxine 25 mg oral tablet  -- 1 tab(s) by mouth 3 times a day  -- Indication: For nausea and vomiting

## 2023-04-29 NOTE — PROGRESS NOTE ADULT - SUBJECTIVE AND OBJECTIVE BOX
ID [id number]    HD: #6  GA: 9w1d    HPI: Pt seen and examined at bedside. Reports that she is feeling better with PO medications. Still endorses some nausea but has been able to tolerate some food including banana, rice and ginger ale. Denies HA, CP, SOB, fevers, chills, urinary symptoms, Denies abdominal cramping/pain, vaginal discharge, vaginal bleeding.    PAST MEDICAL & SURGICAL HISTORY:  No pertinent past medical history  H/o appendectomy    MEDICATIONS  (STANDING):  fat emulsion (Fish Oil and Plant Based) 20% Infusion 1.632 Gm/kG/Day (31.3 mL/Hr) IV Continuous <Continuous>  ondansetron    Tablet 4 milliGRAM(s) Oral every 6 hours  pantoprazole    Tablet 40 milliGRAM(s) Oral before breakfast  Parenteral Nutrition - Adult 1 Each (75 mL/Hr) TPN Continuous <Continuous>  prochlorperazine Suppository 25 milliGRAM(s) Rectal every 12 hours  pyridoxine 25 milliGRAM(s) Oral <User Schedule>  simethicone 80 milliGRAM(s) Chew every 4 hours  sucralfate 1 Gram(s) Oral <User Schedule>    MEDICATIONS  (PRN):  acetaminophen     Tablet .. 650 milliGRAM(s) Oral every 6 hours PRN Mild Pain (1 - 3), Moderate Pain (4 - 6)  diphenhydrAMINE 25 milliGRAM(s) Oral every 12 hours PRN nausea      PAST MEDICAL & SURGICAL HISTORY:  No pertinent past medical history    No significant past surgical history    Physical Exam:   Vital Signs Last 24 Hrs  T(C): 36.8 (29 Apr 2023 05:32), Max: 36.9 (28 Apr 2023 08:35)  T(F): 98.3 (29 Apr 2023 05:32), Max: 98.5 (28 Apr 2023 08:35)  HR: 84 (29 Apr 2023 05:32) (80 - 96)  BP: 95/56 (29 Apr 2023 05:32) (88/53 - 103/66)  RR: 18 (29 Apr 2023 05:32) (18 - 18)  SpO2: 97% (29 Apr 2023 05:32) (97% - 98%)    Parameters below as of 28 Apr 2023 20:17  Patient On (Oxygen Delivery Method): room air    Gen: AOx3, NAD   Cardio: RRR, S1S2, no m/r/g  Pulm: CTA b/l  Abdomen: soft, gravid, nontender, no palpable contractions   MSK: normal passive and active range of motion   Neuro: CN2-12 intact   Extremities: no swelling or erythema noted   Psych: normal mood and affect, no suicidal or homicidal ideations    Labs:                        12.4   9.42  )-----------( 217      ( 29 Apr 2023 05:21 )             35.4                         12.1   10.95 )-----------( 224      ( 28 Apr 2023 06:01 )             34.6      "4/24 7.07>13.2/37.7<279, 138/3.3/105/19/4/<0.5<90, AST/ALT 50/95, beta hydroxy 1.3,  VBG lactate 0.9, UA large ketone  4/25 8.18>11.6/33.0<258, 139/3.7/107/18/3/0.5<70, AST/ALT 71/34, Mg 2.4, Phos 3.4, beta hydroxy butyrate 1.1, O pos  4/26 7.9>11.2/31.5<227, 137/3.5/104/20/3/<0.5<69, AST/ALT 42/70, Mg 1.7, P 3.4, beta hydroxybutyrate 1.4, triglycerides 88  4/27 7.93>12/33.6<239, 139/3.4/105/22/4/0.5<107, P 3.1, Mg 1.9, beta hydroxy 0.6  4/28 10.95>12.1/34.6<224, 134/3.4/100/20/7/0.5<93, Mg 1.9, phos 4, beta hydroxy butyrate 0.4    Ucx neg"

## 2023-04-29 NOTE — DISCHARGE NOTE ANTEPARTUM - CARE PLAN
1 Principal Discharge DX:	Hyperemesis gravidarum  Assessment and plan of treatment:	Please  your medications and take them as directed.   Principal Discharge DX:	Hyperemesis gravidarum  Assessment and plan of treatment:	carafate 1 tableta esmer vez al día  miralax esmer vez al maddie  ondansetrón (zofran) cada 6 horas  pantoprazol 40 mg esmer tableta esmer vez al día  proclorperazina (compazine) 25 mg por vía rectal cada 12 horas (dos veces al día)  piridoxina (B6) 25 mg 1 tableta cada 8 horas (3 veces al día

## 2023-04-29 NOTE — DISCHARGE NOTE ANTEPARTUM - CARE PROVIDER_API CALL
Harvey Lopez)  OBN  [address] [place] [address]  Phone: [phone]  Fax: [phone]  Follow Up Time:

## 2023-04-29 NOTE — CHART NOTE - NSCHARTNOTEFT_GEN_A_CORE
PGY2 NOTE    Patient seen at bedside after receiving a call from RN that patient crying in pain from IV site. Pain began tonight when the PPN started. RN had replaced the IV x3 times and patient endorsing this pain through all site. PPN discontinued and patient continues to endorse pain ranging from her right hand/fingers to upper right shoulder, is decreasing in intensity since the time PPN discontinued. Patient asking to take out the IV due to pain, about to pull it out herself. Upon questioning, patient endorsed she ate rice and a banana today, without vomiting. Vitals and electrolytes continue to be stable, patient appropriately gaining weight.     Patient counseled that she will be unable to receive PPN and IV antiemetics without an IV in place. Also counseled that while she is in the hospital, she requires IV access. Patient again continuing to endorse pain and requesting removal.     On physical exam, both arms normal in appearance. No swelling, no erythema, not cold or warm. Full motor strength noted and full sensation appreciated on full length of arms.     At this time, will d/c IV access and change IV meds to PO. Will re-asses patient in the morning for IV access. PGY2 NOTE    Patient seen at bedside after receiving a call from RN that patient crying in pain from IV site. Pain began tonight when the PPN started. RN had replaced the IV x3 times and patient endorsing this pain through all site. PPN discontinued and patient continues to endorse pain ranging from her right hand/fingers to upper right shoulder, is decreasing in intensity since the time PPN discontinued. Patient asking to take out the IV due to pain, about to pull it out herself.     Upon questioning, patient endorsed she ate rice and a banana today, without vomiting. Vitals and electrolytes continue to be stable, patient appropriately gaining weight.     Patient counseled that she will be unable to receive PPN and IV antiemetics without an IV in place. Also counseled that while she is in the hospital, she requires IV access. Patient again continuing to endorse pain and requesting removal.     On physical exam, both arms normal in appearance. No swelling, no erythema, not cold or warm. Full motor strength noted and full sensation appreciated on full length of arms. Low suspicion for UE DVT.    At this time, will d/c IV access and change IV meds to PO. Will re-asses patient in the morning for new IV access.

## 2023-04-29 NOTE — DISCHARGE NOTE ANTEPARTUM - PATIENT PORTAL LINK FT
You can access the FollowMyHealth Patient Portal offered by [facility] by registering at the following website: [email or web address]. By joining [employer]’s FollowMyHealth portal, you will also be able to view your health information using other applications (apps) compatible with our system.

## 2023-04-29 NOTE — DISCHARGE NOTE ANTEPARTUM - MEDICATION SUMMARY - MEDICATIONS TO STOP TAKING
I will STOP taking the medications listed below when I get home from the hospital:    Pepcid 20 mg oral tablet  -- 1 tab(s) by mouth 2 times a day    pyridoxine 25 mg oral tablet  -- 1 tab(s) by mouth 2 times a day    ondansetron 4 mg oral tablet, disintegrating  -- 1 tab(s) by mouth every 6 hours as needed for  nausea

## 2023-04-29 NOTE — PROGRESS NOTE ADULT - ASSESSMENT
26 yo  @9w1d GA with hyperemesis gravidarum, for inpatient management    - vitals q4 hours  - SCDs  - daily weights  - IV removed due to patient request  - Nutrition consult : start PPN, pyridoxine 25mg TID --> patient refusing IV, aware she cannot receive PPN  - regular diet as tolerated, encouraged to separate solids from liquids   - ambulate as tolerated  - CXR neg, UCx neg  - current meds: zofran 4mg PO q6, compazine 25mg rectal BID, pyridoxine 25mg PO BID, sucralfate 1g PO BID, pantoprazole 40mg PO qd  - continue replace electrolytes as indicated; currently stable  - FHR prior to discharge    Dr. Gomez to be made aware.

## 2023-04-30 ENCOUNTER — TRANSCRIPTION ENCOUNTER (OUTPATIENT)
Age: 28
End: 2023-04-30

## 2023-04-30 VITALS
SYSTOLIC BLOOD PRESSURE: 95 MMHG | TEMPERATURE: 98 F | HEART RATE: 87 BPM | DIASTOLIC BLOOD PRESSURE: 60 MMHG | OXYGEN SATURATION: 98 % | RESPIRATION RATE: 18 BRPM

## 2023-04-30 LAB
ALBUMIN SERPL ELPH-MCNC: 4.1 G/DL — SIGNIFICANT CHANGE UP (ref 3.5–5.2)
ALP SERPL-CCNC: 90 U/L — SIGNIFICANT CHANGE UP (ref 30–115)
ALT FLD-CCNC: 63 U/L — HIGH (ref 0–41)
ANION GAP SERPL CALC-SCNC: 16 MMOL/L — HIGH (ref 7–14)
AST SERPL-CCNC: 37 U/L — SIGNIFICANT CHANGE UP (ref 0–41)
B-OH-BUTYR SERPL-SCNC: 0.6 MMOL/L — HIGH
BASOPHILS # BLD AUTO: 0.04 K/UL — SIGNIFICANT CHANGE UP (ref 0–0.2)
BASOPHILS NFR BLD AUTO: 0.6 % — SIGNIFICANT CHANGE UP (ref 0–1)
BILIRUB SERPL-MCNC: 0.7 MG/DL — SIGNIFICANT CHANGE UP (ref 0.2–1.2)
BUN SERPL-MCNC: 8 MG/DL — LOW (ref 10–20)
CALCIUM SERPL-MCNC: 9.5 MG/DL — SIGNIFICANT CHANGE UP (ref 8.4–10.5)
CHLORIDE SERPL-SCNC: 104 MMOL/L — SIGNIFICANT CHANGE UP (ref 98–110)
CO2 SERPL-SCNC: 18 MMOL/L — SIGNIFICANT CHANGE UP (ref 17–32)
CREAT SERPL-MCNC: <0.5 MG/DL — LOW (ref 0.7–1.5)
EGFR: 138 ML/MIN/1.73M2 — SIGNIFICANT CHANGE UP
EOSINOPHIL # BLD AUTO: 0.09 K/UL — SIGNIFICANT CHANGE UP (ref 0–0.7)
EOSINOPHIL NFR BLD AUTO: 1.3 % — SIGNIFICANT CHANGE UP (ref 0–8)
GLUCOSE SERPL-MCNC: 77 MG/DL — SIGNIFICANT CHANGE UP (ref 70–99)
HCT VFR BLD CALC: 35.3 % — LOW (ref 37–47)
HGB BLD-MCNC: 12.5 G/DL — SIGNIFICANT CHANGE UP (ref 12–16)
IMM GRANULOCYTES NFR BLD AUTO: 0.1 % — SIGNIFICANT CHANGE UP (ref 0.1–0.3)
LYMPHOCYTES # BLD AUTO: 2.28 K/UL — SIGNIFICANT CHANGE UP (ref 1.2–3.4)
LYMPHOCYTES # BLD AUTO: 32.3 % — SIGNIFICANT CHANGE UP (ref 20.5–51.1)
MCHC RBC-ENTMCNC: 30.8 PG — SIGNIFICANT CHANGE UP (ref 27–31)
MCHC RBC-ENTMCNC: 35.4 G/DL — SIGNIFICANT CHANGE UP (ref 32–37)
MCV RBC AUTO: 86.9 FL — SIGNIFICANT CHANGE UP (ref 81–99)
MITOCHONDRIA AB SER-ACNC: SIGNIFICANT CHANGE UP
MONOCYTES # BLD AUTO: 0.57 K/UL — SIGNIFICANT CHANGE UP (ref 0.1–0.6)
MONOCYTES NFR BLD AUTO: 8.1 % — SIGNIFICANT CHANGE UP (ref 1.7–9.3)
NEUTROPHILS # BLD AUTO: 4.07 K/UL — SIGNIFICANT CHANGE UP (ref 1.4–6.5)
NEUTROPHILS NFR BLD AUTO: 57.6 % — SIGNIFICANT CHANGE UP (ref 42.2–75.2)
NRBC # BLD: 0 /100 WBCS — SIGNIFICANT CHANGE UP (ref 0–0)
PLATELET # BLD AUTO: 216 K/UL — SIGNIFICANT CHANGE UP (ref 130–400)
PMV BLD: 11.9 FL — HIGH (ref 7.4–10.4)
POTASSIUM SERPL-MCNC: 3.7 MMOL/L — SIGNIFICANT CHANGE UP (ref 3.5–5)
POTASSIUM SERPL-SCNC: 3.7 MMOL/L — SIGNIFICANT CHANGE UP (ref 3.5–5)
PROT SERPL-MCNC: 6.6 G/DL — SIGNIFICANT CHANGE UP (ref 6–8)
RBC # BLD: 4.06 M/UL — LOW (ref 4.2–5.4)
RBC # FLD: 12.7 % — SIGNIFICANT CHANGE UP (ref 11.5–14.5)
SMOOTH MUSCLE AB SER-ACNC: SIGNIFICANT CHANGE UP
SODIUM SERPL-SCNC: 138 MMOL/L — SIGNIFICANT CHANGE UP (ref 135–146)
WBC # BLD: 7.06 K/UL — SIGNIFICANT CHANGE UP (ref 4.8–10.8)
WBC # FLD AUTO: 7.06 K/UL — SIGNIFICANT CHANGE UP (ref 4.8–10.8)

## 2023-04-30 PROCEDURE — 99238 HOSP IP/OBS DSCHRG MGMT 30/<: CPT

## 2023-04-30 RX ORDER — POLYETHYLENE GLYCOL 3350 17 G/17G
17 POWDER, FOR SOLUTION ORAL
Qty: 1 | Refills: 0
Start: 2023-04-30 | End: 2023-05-29

## 2023-04-30 RX ORDER — POLYETHYLENE GLYCOL 3350 17 G/17G
17 POWDER, FOR SOLUTION ORAL DAILY
Refills: 0 | Status: DISCONTINUED | OUTPATIENT
Start: 2023-04-30 | End: 2023-04-30

## 2023-04-30 RX ADMIN — Medication 25 MILLIGRAM(S): at 06:04

## 2023-04-30 RX ADMIN — ONDANSETRON 4 MILLIGRAM(S): 8 TABLET, FILM COATED ORAL at 00:06

## 2023-04-30 RX ADMIN — SIMETHICONE 80 MILLIGRAM(S): 80 TABLET, CHEWABLE ORAL at 11:22

## 2023-04-30 RX ADMIN — PANTOPRAZOLE SODIUM 40 MILLIGRAM(S): 20 TABLET, DELAYED RELEASE ORAL at 06:03

## 2023-04-30 RX ADMIN — Medication 25 MILLIGRAM(S): at 02:37

## 2023-04-30 RX ADMIN — Medication 1 GRAM(S): at 06:03

## 2023-04-30 RX ADMIN — SIMETHICONE 80 MILLIGRAM(S): 80 TABLET, CHEWABLE ORAL at 02:37

## 2023-04-30 RX ADMIN — POLYETHYLENE GLYCOL 3350 17 GRAM(S): 17 POWDER, FOR SOLUTION ORAL at 11:23

## 2023-04-30 RX ADMIN — ONDANSETRON 4 MILLIGRAM(S): 8 TABLET, FILM COATED ORAL at 11:22

## 2023-04-30 RX ADMIN — SIMETHICONE 80 MILLIGRAM(S): 80 TABLET, CHEWABLE ORAL at 06:03

## 2023-04-30 RX ADMIN — ONDANSETRON 4 MILLIGRAM(S): 8 TABLET, FILM COATED ORAL at 06:03

## 2023-04-30 NOTE — PROGRESS NOTE ADULT - ASSESSMENT
28 yo  @9w2d GA with hyperemesis gravidarum, for inpatient management, clinically improved and tolerating PO,    - vitals q4 hours  - SCDs  - daily weights  - IV removed due to patient request  - Nutrition consult : start PPN, pyridoxine 25mg TID --> patient refusing IV, aware she cannot receive PPN  - regular diet as tolerated, encouraged to separate solids from liquids   - ambulate as tolerated  - CXR neg, UCx neg  - current meds: zofran 4mg PO q6, compazine 25mg rectal BID, pyridoxine 25mg PO BID, sucralfate 1g PO BID, pantoprazole 40mg PO qd, benadryl PRN  - continue replace electrolytes as indicated; currently stable  - FHR prior to discharge    Dr. Méndez and Dr. Hernandez to be aware

## 2023-04-30 NOTE — PROGRESS NOTE ADULT - REASON FOR ADMISSION
hyperemesis gravidarum

## 2023-04-30 NOTE — PROGRESS NOTE ADULT - ATTENDING COMMENTS
28 y/o  @ 9w2d with hyperemesis gravidarum, admitted for management, now improved and tolerating PO. Continue current regimen. Anticipate discharge. Follow up outpatient.

## 2023-04-30 NOTE — PROGRESS NOTE ADULT - SUBJECTIVE AND OBJECTIVE BOX
ID [id number]    HD: #7  GA: 9w2d    HPI: Pt seen and examined at bedside. Reports that she is feeling better with PO medications, still endorsing some nausea and vomiting but is able to keep food down. Denies HA, CP, SOB, fevers, chills, urinary symptoms, Denies abdominal cramping/pain, vaginal discharge, vaginal bleeding.    PAST MEDICAL & SURGICAL HISTORY:  No pertinent past medical history  H/o appendectomy    MEDICATIONS  (STANDING):  ondansetron    Tablet 4 milliGRAM(s) Oral every 6 hours  pantoprazole    Tablet 40 milliGRAM(s) Oral before breakfast  prochlorperazine Suppository 25 milliGRAM(s) Rectal every 12 hours  pyridoxine 25 milliGRAM(s) Oral <User Schedule>  simethicone 80 milliGRAM(s) Chew every 4 hours  sucralfate 1 Gram(s) Oral <User Schedule>    MEDICATIONS  (PRN):  acetaminophen     Tablet .. 650 milliGRAM(s) Oral every 6 hours PRN Mild Pain (1 - 3), Moderate Pain (4 - 6)  diphenhydrAMINE 25 milliGRAM(s) Oral every 12 hours PRN nausea      PAST MEDICAL & SURGICAL HISTORY:  No pertinent past medical history    No significant past surgical history    Physical Exam:   Vital Signs Last 24 Hrs  T(C): 36.9 (30 Apr 2023 04:46), Max: 36.9 (30 Apr 2023 04:46)  T(F): 98.5 (30 Apr 2023 04:46), Max: 98.5 (30 Apr 2023 04:46)  HR: 94 (30 Apr 2023 04:46) (90 - 98)  BP: 101/60 (30 Apr 2023 04:46) (91/55 - 125/59)  RR: 18 (30 Apr 2023 04:46) (18 - 18)  SpO2: 98% (30 Apr 2023 04:46) (98% - 100%)    Parameters below as of 29 Apr 2023 21:17  Patient On (Oxygen Delivery Method): room air    Gen: AOx3, NAD   Cardio: RRR, S1S2, no m/r/g  Pulm: CTA b/l  Abdomen: soft, gravid, nontender, no palpable contractions   MSK: normal passive and active range of motion   Extremities: no swelling or erythema noted   Psych: normal mood and affect, no suicidal or homicidal ideations    Labs:  "4/24 7.07>13.2/37.7<279, 138/3.3/105/19/4/<0.5<90, AST/ALT 50/95, beta hydroxy 1.3,  VBG lactate 0.9, UA large ketone  4/25 8.18>11.6/33.0<258, 139/3.7/107/18/3/0.5<70, AST/ALT 71/34, Mg 2.4, Phos 3.4, beta hydroxy butyrate 1.1, O pos  4/26 7.9>11.2/31.5<227, 137/3.5/104/20/3/<0.5<69, AST/ALT 42/70, Mg 1.7, P 3.4, beta hydroxybutyrate 1.4, triglycerides 88  4/27 7.93>12/33.6<239, 139/3.4/105/22/4/0.5<107, P 3.1, Mg 1.9, beta hydroxy 0.6  4/28 10.95>12.1/34.6<224, 134/3.4/100/20/7/0.5<93, Mg 1.9, phos 4, beta hydroxy butyrate 0.4    Ucx neg"

## 2023-04-30 NOTE — DISCHARGE NOTE NURSING/CASE MANAGEMENT/SOCIAL WORK - NSDCPEFALRISK_GEN_ALL_CORE
For information on Fall & Injury Prevention, visit: [email or web address] OR  [email or web address] OR  https://www.cdc.gov/steadi/patient.html

## 2023-04-30 NOTE — DISCHARGE NOTE NURSING/CASE MANAGEMENT/SOCIAL WORK - PATIENT PORTAL LINK FT
You can access the FollowMyHealth Patient Portal offered by [facility] by registering at the following website: [email or web address]. By joining [employer]’s FollowMyHealth portal, you will also be able to view your health information using other applications (apps) compatible with our system.

## 2023-05-02 LAB — LKM AB SER-ACNC: <20.1 UNITS — SIGNIFICANT CHANGE UP (ref 0–20)

## 2023-05-04 ENCOUNTER — EMERGENCY (EMERGENCY)
Facility: HOSPITAL | Age: 28
LOS: 0 days | Discharge: HOME | End: 2023-05-05
Attending: EMERGENCY MEDICINE
Payer: MEDICAID

## 2023-05-04 VITALS
HEART RATE: 110 BPM | TEMPERATURE: 98 F | RESPIRATION RATE: 18 BRPM | DIASTOLIC BLOOD PRESSURE: 72 MMHG | SYSTOLIC BLOOD PRESSURE: 112 MMHG | OXYGEN SATURATION: 100 % | HEIGHT: 60 IN

## 2023-05-04 DIAGNOSIS — O99.891 OTHER SPECIFIED DISEASES AND CONDITIONS COMPLICATING PREGNANCY: ICD-10-CM

## 2023-05-04 DIAGNOSIS — Z90.49 ACQUIRED ABSENCE OF OTHER SPECIFIED PARTS OF DIGESTIVE TRACT: ICD-10-CM

## 2023-05-04 DIAGNOSIS — Z3A.11 11 WEEKS GESTATION OF PREGNANCY: ICD-10-CM

## 2023-05-04 DIAGNOSIS — R00.0 TACHYCARDIA, UNSPECIFIED: ICD-10-CM

## 2023-05-04 DIAGNOSIS — R10.31 RIGHT LOWER QUADRANT PAIN: ICD-10-CM

## 2023-05-04 DIAGNOSIS — O21.9 VOMITING OF PREGNANCY, UNSPECIFIED: ICD-10-CM

## 2023-05-04 DIAGNOSIS — O23.41 UNSPECIFIED INFECTION OF URINARY TRACT IN PREGNANCY, FIRST TRIMESTER: ICD-10-CM

## 2023-05-04 LAB
ALBUMIN SERPL ELPH-MCNC: 4.3 G/DL — SIGNIFICANT CHANGE UP (ref 3.5–5.2)
ALP SERPL-CCNC: 89 U/L — SIGNIFICANT CHANGE UP (ref 30–115)
ALT FLD-CCNC: 64 U/L — HIGH (ref 0–41)
ANION GAP SERPL CALC-SCNC: 12 MMOL/L — SIGNIFICANT CHANGE UP (ref 7–14)
APPEARANCE UR: ABNORMAL
AST SERPL-CCNC: 43 U/L — HIGH (ref 0–41)
BASOPHILS # BLD AUTO: 0.03 K/UL — SIGNIFICANT CHANGE UP (ref 0–0.2)
BASOPHILS NFR BLD AUTO: 0.4 % — SIGNIFICANT CHANGE UP (ref 0–1)
BILIRUB DIRECT SERPL-MCNC: 0.4 MG/DL — HIGH (ref 0–0.3)
BILIRUB INDIRECT FLD-MCNC: 0.5 MG/DL — SIGNIFICANT CHANGE UP (ref 0.2–1.2)
BILIRUB SERPL-MCNC: 0.9 MG/DL — SIGNIFICANT CHANGE UP (ref 0.2–1.2)
BILIRUB UR-MCNC: NEGATIVE — SIGNIFICANT CHANGE UP
BUN SERPL-MCNC: 6 MG/DL — LOW (ref 10–20)
CALCIUM SERPL-MCNC: 9.7 MG/DL — SIGNIFICANT CHANGE UP (ref 8.4–10.5)
CHLORIDE SERPL-SCNC: 104 MMOL/L — SIGNIFICANT CHANGE UP (ref 98–110)
CO2 SERPL-SCNC: 21 MMOL/L — SIGNIFICANT CHANGE UP (ref 17–32)
COLOR SPEC: YELLOW — SIGNIFICANT CHANGE UP
CREAT SERPL-MCNC: 0.5 MG/DL — LOW (ref 0.7–1.5)
DIFF PNL FLD: NEGATIVE — SIGNIFICANT CHANGE UP
EGFR: 131 ML/MIN/1.73M2 — SIGNIFICANT CHANGE UP
EOSINOPHIL # BLD AUTO: 0.08 K/UL — SIGNIFICANT CHANGE UP (ref 0–0.7)
EOSINOPHIL NFR BLD AUTO: 0.9 % — SIGNIFICANT CHANGE UP (ref 0–8)
GLUCOSE SERPL-MCNC: 104 MG/DL — HIGH (ref 70–99)
GLUCOSE UR QL: NEGATIVE — SIGNIFICANT CHANGE UP
HCT VFR BLD CALC: 38 % — SIGNIFICANT CHANGE UP (ref 37–47)
HGB BLD-MCNC: 13.5 G/DL — SIGNIFICANT CHANGE UP (ref 12–16)
IMM GRANULOCYTES NFR BLD AUTO: 0.2 % — SIGNIFICANT CHANGE UP (ref 0.1–0.3)
KETONES UR-MCNC: ABNORMAL
LEUKOCYTE ESTERASE UR-ACNC: ABNORMAL
LIDOCAIN IGE QN: 85 U/L — HIGH (ref 7–60)
LYMPHOCYTES # BLD AUTO: 1.98 K/UL — SIGNIFICANT CHANGE UP (ref 1.2–3.4)
LYMPHOCYTES # BLD AUTO: 23.3 % — SIGNIFICANT CHANGE UP (ref 20.5–51.1)
MCHC RBC-ENTMCNC: 31.1 PG — HIGH (ref 27–31)
MCHC RBC-ENTMCNC: 35.5 G/DL — SIGNIFICANT CHANGE UP (ref 32–37)
MCV RBC AUTO: 87.6 FL — SIGNIFICANT CHANGE UP (ref 81–99)
MONOCYTES # BLD AUTO: 0.63 K/UL — HIGH (ref 0.1–0.6)
MONOCYTES NFR BLD AUTO: 7.4 % — SIGNIFICANT CHANGE UP (ref 1.7–9.3)
NEUTROPHILS # BLD AUTO: 5.77 K/UL — SIGNIFICANT CHANGE UP (ref 1.4–6.5)
NEUTROPHILS NFR BLD AUTO: 67.8 % — SIGNIFICANT CHANGE UP (ref 42.2–75.2)
NITRITE UR-MCNC: NEGATIVE — SIGNIFICANT CHANGE UP
NRBC # BLD: 0 /100 WBCS — SIGNIFICANT CHANGE UP (ref 0–0)
PH UR: 6.5 — SIGNIFICANT CHANGE UP (ref 5–8)
PLATELET # BLD AUTO: 238 K/UL — SIGNIFICANT CHANGE UP (ref 130–400)
PMV BLD: 11.4 FL — HIGH (ref 7.4–10.4)
POTASSIUM SERPL-MCNC: 4 MMOL/L — SIGNIFICANT CHANGE UP (ref 3.5–5)
POTASSIUM SERPL-SCNC: 4 MMOL/L — SIGNIFICANT CHANGE UP (ref 3.5–5)
PROT SERPL-MCNC: 6.9 G/DL — SIGNIFICANT CHANGE UP (ref 6–8)
PROT UR-MCNC: SIGNIFICANT CHANGE UP
RBC # BLD: 4.34 M/UL — SIGNIFICANT CHANGE UP (ref 4.2–5.4)
RBC # FLD: 12.4 % — SIGNIFICANT CHANGE UP (ref 11.5–14.5)
SODIUM SERPL-SCNC: 137 MMOL/L — SIGNIFICANT CHANGE UP (ref 135–146)
SP GR SPEC: 1.01 — LOW (ref 1.01–1.03)
UROBILINOGEN FLD QL: ABNORMAL
WBC # BLD: 8.51 K/UL — SIGNIFICANT CHANGE UP (ref 4.8–10.8)
WBC # FLD AUTO: 8.51 K/UL — SIGNIFICANT CHANGE UP (ref 4.8–10.8)

## 2023-05-04 PROCEDURE — 80076 HEPATIC FUNCTION PANEL: CPT

## 2023-05-04 PROCEDURE — 99285 EMERGENCY DEPT VISIT HI MDM: CPT | Mod: 25

## 2023-05-04 PROCEDURE — 99285 EMERGENCY DEPT VISIT HI MDM: CPT

## 2023-05-04 PROCEDURE — 76856 US EXAM PELVIC COMPLETE: CPT | Mod: 26

## 2023-05-04 PROCEDURE — 80048 BASIC METABOLIC PNL TOTAL CA: CPT

## 2023-05-04 PROCEDURE — 83690 ASSAY OF LIPASE: CPT

## 2023-05-04 PROCEDURE — 87086 URINE CULTURE/COLONY COUNT: CPT

## 2023-05-04 PROCEDURE — 76705 ECHO EXAM OF ABDOMEN: CPT

## 2023-05-04 PROCEDURE — 81001 URINALYSIS AUTO W/SCOPE: CPT

## 2023-05-04 PROCEDURE — 85025 COMPLETE CBC W/AUTO DIFF WBC: CPT

## 2023-05-04 PROCEDURE — 93010 ELECTROCARDIOGRAM REPORT: CPT

## 2023-05-04 PROCEDURE — 76856 US EXAM PELVIC COMPLETE: CPT

## 2023-05-04 PROCEDURE — 71045 X-RAY EXAM CHEST 1 VIEW: CPT

## 2023-05-04 PROCEDURE — 93005 ELECTROCARDIOGRAM TRACING: CPT

## 2023-05-04 PROCEDURE — 71045 X-RAY EXAM CHEST 1 VIEW: CPT | Mod: 26

## 2023-05-04 PROCEDURE — 96374 THER/PROPH/DIAG INJ IV PUSH: CPT

## 2023-05-04 PROCEDURE — 36415 COLL VENOUS BLD VENIPUNCTURE: CPT

## 2023-05-04 RX ORDER — ACETAMINOPHEN 500 MG
975 TABLET ORAL ONCE
Refills: 0 | Status: COMPLETED | OUTPATIENT
Start: 2023-05-04 | End: 2023-05-04

## 2023-05-04 RX ORDER — SODIUM CHLORIDE 9 MG/ML
1000 INJECTION, SOLUTION INTRAVENOUS
Refills: 0 | Status: DISCONTINUED | OUTPATIENT
Start: 2023-05-04 | End: 2023-05-05

## 2023-05-04 RX ORDER — PYRIDOXINE HCL (VITAMIN B6) 100 MG
25 TABLET ORAL ONCE
Refills: 0 | Status: COMPLETED | OUTPATIENT
Start: 2023-05-04 | End: 2023-05-04

## 2023-05-04 RX ORDER — SODIUM CHLORIDE 9 MG/ML
1000 INJECTION, SOLUTION INTRAVENOUS ONCE
Refills: 0 | Status: COMPLETED | OUTPATIENT
Start: 2023-05-04 | End: 2023-05-04

## 2023-05-04 RX ORDER — ONDANSETRON 8 MG/1
8 TABLET, FILM COATED ORAL ONCE
Refills: 0 | Status: COMPLETED | OUTPATIENT
Start: 2023-05-04 | End: 2023-05-04

## 2023-05-04 RX ADMIN — Medication 975 MILLIGRAM(S): at 23:39

## 2023-05-04 RX ADMIN — SODIUM CHLORIDE 1000 MILLILITER(S): 9 INJECTION, SOLUTION INTRAVENOUS at 23:05

## 2023-05-04 RX ADMIN — ONDANSETRON 8 MILLIGRAM(S): 8 TABLET, FILM COATED ORAL at 23:06

## 2023-05-04 RX ADMIN — Medication 25 MILLIGRAM(S): at 23:06

## 2023-05-04 NOTE — ED PROVIDER NOTE - PROGRESS NOTE DETAILS
Resident AO: Patient reassessed multiple times in the ED. She feels better and is tolerating p.o. UA noted. Antibiotics sent.

## 2023-05-04 NOTE — ED PROVIDER NOTE - CLINICAL SUMMARY MEDICAL DECISION MAKING FREE TEXT BOX
26 yo F,  at 10 weeks GA here for assessment of R sided abdominal pain -- onset of pain is with onset of pregnancy, has been seen numerous times here and elsewhere for this, diagnosed with ovarian cyst, had laparoscopic exploration to ro appendicitis, did not have appendicitis but appendix was removed, diagnosed with pyelo and admitted for hyperemesis. She was discharged 4 days ago and presents with unchanged pain, intermittent vomiting.    No vomiting in ED, notes she vomited 3x today PTA.     No dysuria, hematuria, fever, chills.    VS normal, on exam is well hydrated, in no distress, has subjective pain in RLQ but no localized ttp.    US with IUP, labs reviewed, no signs of starvation ketosis, UA positive.    After treatment in ED, fluids, anti-emitic, tylenol, is tolerating PO well and pain has resolved.    At this point, given longstanding pain, extensive previous work up and that patient is currently feeling well, will dc home with continued monitoring, follow up as planned on discharge, return precautions.

## 2023-05-04 NOTE — ED PROVIDER NOTE - PATIENT PORTAL LINK FT
You can access the FollowMyHealth Patient Portal offered by [facility] by registering at the following website: [email or web address]. By joining [email or web address]’s FollowMyHealth portal, you will also be able to view your health information using other applications (apps) compatible with our system.

## 2023-05-04 NOTE — ED PROVIDER NOTE - ADDITIONAL NOTES AND INSTRUCTIONS:
No indicators present This patient was seen in our Emergency Department today for an urgent issue.   Please excuse them from work and/or school for today.    They may return on the date above (or earlier if feeling better) with the following restrictions: activity as tolerated.

## 2023-05-04 NOTE — ED PROVIDER NOTE - OBJECTIVE STATEMENT
Patient is a 27-year-old woman, , 10 weeks 0 days gestational age by LMP of 2023, with a history of hyperemesis gravidarum, prior appendectomy, presenting for right lower quadrant abdominal pain since yesterday.  History goes back to early 2023, when patient first developed right lower quadrant pain.  Since then, she has had a right sided hemorrhagic ovarian cyst, appendectomy, treatment for pyelonephritis on the right, has been admitted for hyperemesis gravidarum, however has not followed up outpatient.  Patient presents for unchanged symptoms.  Complains of NBNB emesis difficulty tolerating p.o. associated with right lower quadrant pain.  No vaginal bleeding or vaginal discharge.  No trauma to the abdomen.  No fever, recent travel, or diarrhea.  Last bowel movement yesterday, nonbloody. No polyuria, polydipsia. No dysuria, frequency, urgency, flank pain. No hematuria.   No other complaints - no CP, palpitations, SOB, cough, new joint pain, FND, rash.

## 2023-05-04 NOTE — ED PROVIDER NOTE - NSFOLLOWUPINSTRUCTIONS_ED_ALL_ED_FT
!! VERY IMPORTANT !! Please follow up with ObGyn:    ~~~~¡MUY IMPORTANTE!~~~~ Liam un seguimiento con Ob[facility]yn:  [facility])  [address]  [facility] [address]  Teléfono: [phone]  Fax: [phone]  Nuestros coordinadores de referencias del departamento de emergencias se comunicarán con usted en las próximas 24 a  48 horas de 9:00 a. m. a 5:00 p. m. (de lunes a viernes) con esmer richardson de seguimiento. Espere esmer llamada telefónica del hospital en alma período de tiempo. Si no recibe esmer llamada o si tiene alguna pregunta o inquietud, puede comunicarse con ellos al [phone].      · Consuma esmer dieta panda balanceada que incluya proteínas (kady magras, britney, pescado y frijoles), frutas o jugos frescos, verduras frescas y productos lácteos.  · No liam dieta ni se “muera de hambre” para volver a la forma que tenía antes del embarazo.  · Amarilis de 8 a 10 vasos de líquido al día.    Para el dolor, puede rell los siguientes medicamentos de venta kiesha:  - Acetaminophen (Tylenol, Midol) 650-1000 mg hasta cada 4-6 horas, según sea necesario (máx. 4000 mg/24 horas)    Por favor tome los siguientes medicamentos:     Para las náuseas:  - piridoxina (B6) 25 mg 1 tableta cada 8 horas (3 veces al día)  - ondansetrón (zofran) cada 6 horas  - proclorperazina (compazine) 25 mg por vía rectal cada 12 horas (dos veces al día)    Para molestias abdominales:  - pantoprazol 40 mg esmer tableta esmer vez al día  - carafate 1 tableta esmer vez al día    Para el estreñimiento:  - miralax esmer vez al maddie    Para la infección del tracto urinario:  - antibiotico cefpodoxime esmer tableta dos veces al día hortencia siete días    ---------------------------------------------------------------------------------------------------    Llame a chisholm médico/clínica o regrese al hospital para:  Sangrado vaginal.    Cambio o aumento en el flujo vaginal.  Dolor abdominal severo.  Ruptura de membrana (chorro o fuga lenta de líquido transparente o verdoso).  Visión borrosa.  Temperatura superior a 100.0 grados F, por la boca.  Dolor de david persistente que no se rahul con Tylenol.  Vómitos y diarrea persistentes.  Disminución de los movimientos fetales.  Contracciones uterinas regulares.  Área hinchada en la pierna que duele, está renzo o caliente.  Dolor en las pantorrillas de las piernas.

## 2023-05-04 NOTE — ED PROVIDER NOTE - PHYSICAL EXAMINATION
_  Vital signs reviewed; ABCs intact  GENERAL: Well nourished, comfortable  SKIN: Warm, dry  HEAD & NECK: NCAT, supple neck  EYES: EOMI, PER B/L  ENT: MMM  CARD: +Mildly tachycardic, RRR, S1, S2; no murmurs, no rubs, no gallops  RESP: Normal respiratory effort, CTAB, no rales, no wheezing  ABD: Soft, ND, +mild TTP to RLQ, no rebound, no guarding, +BS; no CVAT  EXT: Pulses palpable distally  NEUROMSK: Grossly intact  PSYCH: AAOx3, cooperative, appropriate

## 2023-05-05 DIAGNOSIS — Z28.21 IMMUNIZATION NOT CARRIED OUT BECAUSE OF PATIENT REFUSAL: ICD-10-CM

## 2023-05-05 DIAGNOSIS — E83.42 HYPOMAGNESEMIA: ICD-10-CM

## 2023-05-05 DIAGNOSIS — Z3A.08 8 WEEKS GESTATION OF PREGNANCY: ICD-10-CM

## 2023-05-05 DIAGNOSIS — O21.1 HYPEREMESIS GRAVIDARUM WITH METABOLIC DISTURBANCE: ICD-10-CM

## 2023-05-05 DIAGNOSIS — O99.281 ENDOCRINE, NUTRITIONAL AND METABOLIC DISEASES COMPLICATING PREGNANCY, FIRST TRIMESTER: ICD-10-CM

## 2023-05-05 DIAGNOSIS — Z90.49 ACQUIRED ABSENCE OF OTHER SPECIFIED PARTS OF DIGESTIVE TRACT: ICD-10-CM

## 2023-05-05 LAB
BACTERIA # UR AUTO: ABNORMAL
EPI CELLS # UR: 4 /HPF — SIGNIFICANT CHANGE UP (ref 0–5)
HSV1 AB FLD QL: NEGATIVE — SIGNIFICANT CHANGE UP
HSV2 AB FLD-ACNC: NEGATIVE — SIGNIFICANT CHANGE UP
HYALINE CASTS # UR AUTO: 5 /LPF — SIGNIFICANT CHANGE UP (ref 0–7)
RBC CASTS # UR COMP ASSIST: 1 /HPF — SIGNIFICANT CHANGE UP (ref 0–4)
WBC UR QL: 46 /HPF — HIGH (ref 0–5)

## 2023-05-05 RX ORDER — CEFPODOXIME PROXETIL 100 MG
1 TABLET ORAL
Qty: 14 | Refills: 0
Start: 2023-05-05 | End: 2023-05-11

## 2023-05-05 NOTE — ED ADULT NURSE NOTE - OBJECTIVE STATEMENT
28 y/o female presented to ED c/o RLQ abdominal pain associated with nausea and vomiting and decreased PO intake. Pt also 10 weeks gestation. LMP 2/24/2023.

## 2023-05-06 LAB
CULTURE RESULTS: NO GROWTH — SIGNIFICANT CHANGE UP
SPECIMEN SOURCE: SIGNIFICANT CHANGE UP

## 2023-05-08 ENCOUNTER — INPATIENT (INPATIENT)
Facility: HOSPITAL | Age: 28
LOS: 8 days | Discharge: HOME HEALTH | DRG: 566 | End: 2023-05-17
Attending: OBSTETRICS & GYNECOLOGY | Admitting: OBSTETRICS & GYNECOLOGY
Payer: MEDICAID

## 2023-05-08 VITALS
DIASTOLIC BLOOD PRESSURE: 72 MMHG | OXYGEN SATURATION: 99 % | TEMPERATURE: 98 F | HEART RATE: 122 BPM | SYSTOLIC BLOOD PRESSURE: 109 MMHG | HEIGHT: 60 IN | RESPIRATION RATE: 18 BRPM

## 2023-05-08 LAB
ALBUMIN SERPL ELPH-MCNC: 4.7 G/DL — SIGNIFICANT CHANGE UP (ref 3.5–5.2)
ALP SERPL-CCNC: 91 U/L — SIGNIFICANT CHANGE UP (ref 30–115)
ALT FLD-CCNC: 58 U/L — HIGH (ref 0–41)
ANION GAP SERPL CALC-SCNC: 17 MMOL/L — HIGH (ref 7–14)
APPEARANCE UR: ABNORMAL
AST SERPL-CCNC: 32 U/L — SIGNIFICANT CHANGE UP (ref 0–41)
B-OH-BUTYR SERPL-SCNC: 2.2 MMOL/L — HIGH
BACTERIA # UR AUTO: ABNORMAL
BASOPHILS # BLD AUTO: 0.03 K/UL — SIGNIFICANT CHANGE UP (ref 0–0.2)
BASOPHILS NFR BLD AUTO: 0.4 % — SIGNIFICANT CHANGE UP (ref 0–1)
BILIRUB SERPL-MCNC: 1 MG/DL — SIGNIFICANT CHANGE UP (ref 0.2–1.2)
BILIRUB UR-MCNC: ABNORMAL
BUN SERPL-MCNC: 9 MG/DL — LOW (ref 10–20)
CALCIUM SERPL-MCNC: 9.5 MG/DL — SIGNIFICANT CHANGE UP (ref 8.4–10.5)
CHLORIDE SERPL-SCNC: 102 MMOL/L — SIGNIFICANT CHANGE UP (ref 98–110)
CO2 SERPL-SCNC: 18 MMOL/L — SIGNIFICANT CHANGE UP (ref 17–32)
COLOR SPEC: ABNORMAL
COMMENT - URINE: SIGNIFICANT CHANGE UP
CREAT SERPL-MCNC: 0.5 MG/DL — LOW (ref 0.7–1.5)
DIFF PNL FLD: NEGATIVE — SIGNIFICANT CHANGE UP
EGFR: 131 ML/MIN/1.73M2 — SIGNIFICANT CHANGE UP
EOSINOPHIL # BLD AUTO: 0.04 K/UL — SIGNIFICANT CHANGE UP (ref 0–0.7)
EOSINOPHIL NFR BLD AUTO: 0.5 % — SIGNIFICANT CHANGE UP (ref 0–8)
EPI CELLS # UR: 16 /HPF — HIGH (ref 0–5)
GLUCOSE SERPL-MCNC: 85 MG/DL — SIGNIFICANT CHANGE UP (ref 70–99)
GLUCOSE UR QL: NEGATIVE — SIGNIFICANT CHANGE UP
HCG SERPL-ACNC: HIGH MIU/ML
HCT VFR BLD CALC: 40.5 % — SIGNIFICANT CHANGE UP (ref 37–47)
HGB BLD-MCNC: 14 G/DL — SIGNIFICANT CHANGE UP (ref 12–16)
HYALINE CASTS # UR AUTO: 30 /LPF — HIGH (ref 0–7)
IMM GRANULOCYTES NFR BLD AUTO: 0.2 % — SIGNIFICANT CHANGE UP (ref 0.1–0.3)
KETONES UR-MCNC: ABNORMAL
LACTATE SERPL-SCNC: 1.3 MMOL/L — SIGNIFICANT CHANGE UP (ref 0.7–2)
LEUKOCYTE ESTERASE UR-ACNC: ABNORMAL
LYMPHOCYTES # BLD AUTO: 2.12 K/UL — SIGNIFICANT CHANGE UP (ref 1.2–3.4)
LYMPHOCYTES # BLD AUTO: 25.2 % — SIGNIFICANT CHANGE UP (ref 20.5–51.1)
MAGNESIUM SERPL-MCNC: 1.9 MG/DL — SIGNIFICANT CHANGE UP (ref 1.8–2.4)
MCHC RBC-ENTMCNC: 30.4 PG — SIGNIFICANT CHANGE UP (ref 27–31)
MCHC RBC-ENTMCNC: 34.6 G/DL — SIGNIFICANT CHANGE UP (ref 32–37)
MCV RBC AUTO: 88 FL — SIGNIFICANT CHANGE UP (ref 81–99)
MONOCYTES # BLD AUTO: 0.42 K/UL — SIGNIFICANT CHANGE UP (ref 0.1–0.6)
MONOCYTES NFR BLD AUTO: 5 % — SIGNIFICANT CHANGE UP (ref 1.7–9.3)
NEUTROPHILS # BLD AUTO: 5.77 K/UL — SIGNIFICANT CHANGE UP (ref 1.4–6.5)
NEUTROPHILS NFR BLD AUTO: 68.7 % — SIGNIFICANT CHANGE UP (ref 42.2–75.2)
NITRITE UR-MCNC: NEGATIVE — SIGNIFICANT CHANGE UP
NRBC # BLD: 0 /100 WBCS — SIGNIFICANT CHANGE UP (ref 0–0)
PH UR: 6 — SIGNIFICANT CHANGE UP (ref 5–8)
PLATELET # BLD AUTO: 279 K/UL — SIGNIFICANT CHANGE UP (ref 130–400)
PMV BLD: 11.8 FL — HIGH (ref 7.4–10.4)
POTASSIUM SERPL-MCNC: 3.6 MMOL/L — SIGNIFICANT CHANGE UP (ref 3.5–5)
POTASSIUM SERPL-SCNC: 3.6 MMOL/L — SIGNIFICANT CHANGE UP (ref 3.5–5)
PROT SERPL-MCNC: 7.4 G/DL — SIGNIFICANT CHANGE UP (ref 6–8)
PROT UR-MCNC: ABNORMAL
RBC # BLD: 4.6 M/UL — SIGNIFICANT CHANGE UP (ref 4.2–5.4)
RBC # FLD: 12.4 % — SIGNIFICANT CHANGE UP (ref 11.5–14.5)
RBC CASTS # UR COMP ASSIST: 2 /HPF — SIGNIFICANT CHANGE UP (ref 0–4)
SODIUM SERPL-SCNC: 137 MMOL/L — SIGNIFICANT CHANGE UP (ref 135–146)
SP GR SPEC: 1.03 — SIGNIFICANT CHANGE UP (ref 1.01–1.03)
UROBILINOGEN FLD QL: ABNORMAL
WBC # BLD: 8.4 K/UL — SIGNIFICANT CHANGE UP (ref 4.8–10.8)
WBC # FLD AUTO: 8.4 K/UL — SIGNIFICANT CHANGE UP (ref 4.8–10.8)
WBC UR QL: 24 /HPF — HIGH (ref 0–5)

## 2023-05-08 PROCEDURE — 99285 EMERGENCY DEPT VISIT HI MDM: CPT

## 2023-05-08 RX ORDER — FAMOTIDINE 10 MG/ML
20 INJECTION INTRAVENOUS ONCE
Refills: 0 | Status: COMPLETED | OUTPATIENT
Start: 2023-05-08 | End: 2023-05-08

## 2023-05-08 RX ORDER — ONDANSETRON 8 MG/1
4 TABLET, FILM COATED ORAL ONCE
Refills: 0 | Status: COMPLETED | OUTPATIENT
Start: 2023-05-08 | End: 2023-05-08

## 2023-05-08 RX ORDER — METOCLOPRAMIDE HCL 10 MG
10 TABLET ORAL ONCE
Refills: 0 | Status: COMPLETED | OUTPATIENT
Start: 2023-05-08 | End: 2023-05-08

## 2023-05-08 RX ORDER — SODIUM CHLORIDE 9 MG/ML
1000 INJECTION INTRAMUSCULAR; INTRAVENOUS; SUBCUTANEOUS ONCE
Refills: 0 | Status: COMPLETED | OUTPATIENT
Start: 2023-05-08 | End: 2023-05-08

## 2023-05-08 RX ORDER — CEFTRIAXONE 500 MG/1
1000 INJECTION, POWDER, FOR SOLUTION INTRAMUSCULAR; INTRAVENOUS ONCE
Refills: 0 | Status: COMPLETED | OUTPATIENT
Start: 2023-05-08 | End: 2023-05-08

## 2023-05-08 RX ORDER — ACETAMINOPHEN 500 MG
650 TABLET ORAL ONCE
Refills: 0 | Status: COMPLETED | OUTPATIENT
Start: 2023-05-08 | End: 2023-05-08

## 2023-05-08 RX ORDER — SODIUM CHLORIDE 9 MG/ML
1000 INJECTION, SOLUTION INTRAVENOUS
Refills: 0 | Status: DISCONTINUED | OUTPATIENT
Start: 2023-05-08 | End: 2023-05-09

## 2023-05-08 RX ADMIN — SODIUM CHLORIDE 500 MILLILITER(S): 9 INJECTION, SOLUTION INTRAVENOUS at 23:35

## 2023-05-08 RX ADMIN — Medication 104 MILLIGRAM(S): at 17:59

## 2023-05-08 RX ADMIN — CEFTRIAXONE 100 MILLIGRAM(S): 500 INJECTION, POWDER, FOR SOLUTION INTRAMUSCULAR; INTRAVENOUS at 22:14

## 2023-05-08 RX ADMIN — Medication 650 MILLIGRAM(S): at 22:13

## 2023-05-08 RX ADMIN — ONDANSETRON 4 MILLIGRAM(S): 8 TABLET, FILM COATED ORAL at 20:46

## 2023-05-08 RX ADMIN — FAMOTIDINE 20 MILLIGRAM(S): 10 INJECTION INTRAVENOUS at 20:46

## 2023-05-08 RX ADMIN — SODIUM CHLORIDE 1000 MILLILITER(S): 9 INJECTION INTRAMUSCULAR; INTRAVENOUS; SUBCUTANEOUS at 17:59

## 2023-05-08 RX ADMIN — Medication 650 MILLIGRAM(S): at 23:00

## 2023-05-08 NOTE — ED PROVIDER NOTE - PHYSICAL EXAMINATION
PHYSICAL EXAM:    GENERAL: Alert, appears stated age, non-toxic  SKIN: Warm, pink and dry.   HEAD: NC, AT  EYE: Normal lids/conjunctiva  ENT: Normal hearing, patent oropharynx   NECK: +supple. No meningismus, or JVD  Pulm: Bilateral BS, normal resp effort, no wheezes, stridor, or retractions  CV: RRR, no M/R/G, 2+and = radial pulses  Abd: soft, +mild RUQ TTP. no murphys. non-distended, no rebound/guarding. no rlq/llq ttp. no CVA tenderness.   Mskel: no erythema, cyanosis, edema. no calf tenderness  Neuro: AAOx3,  normal gait

## 2023-05-08 NOTE — ED PROVIDER NOTE - NS ED ROS FT
- - -
Review of Systems    Constitutional: (-) fever   Eyes/ENT: (-) vision changes  Cardiovascular: (-) chest pain, (-) syncope (-) palpitations  Respiratory: (-) cough, (-) shortness of breath  Gastrointestinal: (+) vomiting, (-) diarrhea (-)black/bloody stools (+) abdominal pain  Genitourinary:  (-) dysuria   Musculoskeletal: (-) neck pain, (-) back pain, (-) leg pain/swelling  Integumentary: (-) rash, (-) edema  Neurological: (-) headache,  (-) confusion  Hematologic: (-) easy bruising

## 2023-05-08 NOTE — ED PROVIDER NOTE - ATTENDING APP SHARED VISIT CONTRIBUTION OF CARE
28 yo F with no PMH  currently 3 months pregnant diagnosed with hyperemesis gravidarum presents to the ED for nausea and vomiting. Pt was seen in the ED 3 days ago for similar concerns and was dc home with zofran which she has been taking with minimal relief. Pt states she was up all night last night vomiting. She has been unable to tolerate PO. She is currently on cefpodoxime which she has been taking. No back pain, no fevers, no chills, no dysuria, no hematuria.     Const: appears tired   Eyes: PERRL, no conjunctival injection  HENT:  Neck supple without meningismus   CV: RRR, Warm, well-perfused extremities  RESP: CTA B/L, no tachypnea   GI: soft, non-tender, non-distended  MSK: No gross deformities appreciated  Skin: Warm, dry. No rashes  Neuro: Alert, CNs II-XII grossly intact. Sensation and motor function of extremities grossly intact.  Psych: Appropriate mood and affect.    will do labs, UA, give fluids, reglan

## 2023-05-08 NOTE — ED PROVIDER NOTE - PROGRESS NOTE DETAILS
Nurse called. IV in but unable to get labs. Dr. Lepe: sign out   pending US and GYN recommendations SR: s.o received from dr. silva pending us & gyn. GORAN NIETO:--late entry-- FHR on POCUS 168bpm. also placed US guided 20 IV.

## 2023-05-08 NOTE — ED PROVIDER NOTE - CLINICAL SUMMARY MEDICAL DECISION MAKING FREE TEXT BOX
28 yo F with no PMH  currently 3 months pregnant diagnosed with hyperemesis gravidarum presents to the ED for nausea and vomiting. Pt was seen in the ED 3 days ago for similar concerns and was dc home with zofran which she has been taking with minimal relief. Pt states she was up all night last night vomiting. She has been unable to tolerate PO. She is currently on cefpodoxime which she has been taking. No back pain, no fevers, no chills, no dysuria, no hematuria.  vs reviewed labs imaging obtained and reviewed, meds given. ob consulted patient admitted.

## 2023-05-08 NOTE — ED PROVIDER NOTE - OBJECTIVE STATEMENT
27-year-old female currently 10 weeks pregnant, with hyperemesis, here multiple times in the last few weeks for hyperemesis, also recently diagnosed with UTI--unable to take antibiotics secondary to hyperemesis, presents with nausea, vomiting which has been persistent x weeks.   No palliating/provoking factors.  + Mild right upper quadrant pain.  No fever, diarrhea, chest pain, shortness of breath, rash, vaginal bleeding, lower abdominal pain.

## 2023-05-09 DIAGNOSIS — R11.2 NAUSEA WITH VOMITING, UNSPECIFIED: ICD-10-CM

## 2023-05-09 DIAGNOSIS — Z90.49 ACQUIRED ABSENCE OF OTHER SPECIFIED PARTS OF DIGESTIVE TRACT: Chronic | ICD-10-CM

## 2023-05-09 LAB
ALBUMIN SERPL ELPH-MCNC: 3.6 G/DL — SIGNIFICANT CHANGE UP (ref 3.5–5.2)
ALP SERPL-CCNC: 67 U/L — SIGNIFICANT CHANGE UP (ref 30–115)
ALT FLD-CCNC: 43 U/L — HIGH (ref 0–41)
ANION GAP SERPL CALC-SCNC: 14 MMOL/L — SIGNIFICANT CHANGE UP (ref 7–14)
AST SERPL-CCNC: 26 U/L — SIGNIFICANT CHANGE UP (ref 0–41)
B-OH-BUTYR SERPL-SCNC: 0.9 MMOL/L — HIGH
BASOPHILS # BLD AUTO: 0.04 K/UL — SIGNIFICANT CHANGE UP (ref 0–0.2)
BASOPHILS NFR BLD AUTO: 0.6 % — SIGNIFICANT CHANGE UP (ref 0–1)
BILIRUB SERPL-MCNC: 0.9 MG/DL — SIGNIFICANT CHANGE UP (ref 0.2–1.2)
BUN SERPL-MCNC: 6 MG/DL — LOW (ref 10–20)
CALCIUM SERPL-MCNC: 8.6 MG/DL — SIGNIFICANT CHANGE UP (ref 8.4–10.5)
CHLORIDE SERPL-SCNC: 105 MMOL/L — SIGNIFICANT CHANGE UP (ref 98–110)
CO2 SERPL-SCNC: 19 MMOL/L — SIGNIFICANT CHANGE UP (ref 17–32)
CREAT SERPL-MCNC: <0.5 MG/DL — LOW (ref 0.7–1.5)
EGFR: 138 ML/MIN/1.73M2 — SIGNIFICANT CHANGE UP
EOSINOPHIL # BLD AUTO: 0.12 K/UL — SIGNIFICANT CHANGE UP (ref 0–0.7)
EOSINOPHIL NFR BLD AUTO: 1.8 % — SIGNIFICANT CHANGE UP (ref 0–8)
GLUCOSE SERPL-MCNC: 78 MG/DL — SIGNIFICANT CHANGE UP (ref 70–99)
HCT VFR BLD CALC: 31.7 % — LOW (ref 37–47)
HGB BLD-MCNC: 11.1 G/DL — LOW (ref 12–16)
IMM GRANULOCYTES NFR BLD AUTO: 0.1 % — SIGNIFICANT CHANGE UP (ref 0.1–0.3)
LYMPHOCYTES # BLD AUTO: 2.31 K/UL — SIGNIFICANT CHANGE UP (ref 1.2–3.4)
LYMPHOCYTES # BLD AUTO: 33.7 % — SIGNIFICANT CHANGE UP (ref 20.5–51.1)
MAGNESIUM SERPL-MCNC: 1.7 MG/DL — LOW (ref 1.8–2.4)
MCHC RBC-ENTMCNC: 30.9 PG — SIGNIFICANT CHANGE UP (ref 27–31)
MCHC RBC-ENTMCNC: 35 G/DL — SIGNIFICANT CHANGE UP (ref 32–37)
MCV RBC AUTO: 88.3 FL — SIGNIFICANT CHANGE UP (ref 81–99)
MONOCYTES # BLD AUTO: 0.56 K/UL — SIGNIFICANT CHANGE UP (ref 0.1–0.6)
MONOCYTES NFR BLD AUTO: 8.2 % — SIGNIFICANT CHANGE UP (ref 1.7–9.3)
NEUTROPHILS # BLD AUTO: 3.81 K/UL — SIGNIFICANT CHANGE UP (ref 1.4–6.5)
NEUTROPHILS NFR BLD AUTO: 55.6 % — SIGNIFICANT CHANGE UP (ref 42.2–75.2)
NRBC # BLD: 0 /100 WBCS — SIGNIFICANT CHANGE UP (ref 0–0)
PHOSPHATE SERPL-MCNC: 3.8 MG/DL — SIGNIFICANT CHANGE UP (ref 2.1–4.9)
PLATELET # BLD AUTO: 233 K/UL — SIGNIFICANT CHANGE UP (ref 130–400)
PMV BLD: 11.5 FL — HIGH (ref 7.4–10.4)
POTASSIUM SERPL-MCNC: 3.4 MMOL/L — LOW (ref 3.5–5)
POTASSIUM SERPL-SCNC: 3.4 MMOL/L — LOW (ref 3.5–5)
PROT SERPL-MCNC: 5.7 G/DL — LOW (ref 6–8)
RBC # BLD: 3.59 M/UL — LOW (ref 4.2–5.4)
RBC # FLD: 12.6 % — SIGNIFICANT CHANGE UP (ref 11.5–14.5)
SODIUM SERPL-SCNC: 138 MMOL/L — SIGNIFICANT CHANGE UP (ref 135–146)
WBC # BLD: 6.85 K/UL — SIGNIFICANT CHANGE UP (ref 4.8–10.8)
WBC # FLD AUTO: 6.85 K/UL — SIGNIFICANT CHANGE UP (ref 4.8–10.8)

## 2023-05-09 PROCEDURE — 80048 BASIC METABOLIC PNL TOTAL CA: CPT

## 2023-05-09 PROCEDURE — 86592 SYPHILIS TEST NON-TREP QUAL: CPT

## 2023-05-09 PROCEDURE — 86762 RUBELLA ANTIBODY: CPT

## 2023-05-09 PROCEDURE — 86901 BLOOD TYPING SEROLOGIC RH(D): CPT

## 2023-05-09 PROCEDURE — 93010 ELECTROCARDIOGRAM REPORT: CPT

## 2023-05-09 PROCEDURE — 83036 HEMOGLOBIN GLYCOSYLATED A1C: CPT

## 2023-05-09 PROCEDURE — 83655 ASSAY OF LEAD: CPT

## 2023-05-09 PROCEDURE — 36415 COLL VENOUS BLD VENIPUNCTURE: CPT

## 2023-05-09 PROCEDURE — 76705 ECHO EXAM OF ABDOMEN: CPT | Mod: 26

## 2023-05-09 PROCEDURE — C9113: CPT

## 2023-05-09 PROCEDURE — 86850 RBC ANTIBODY SCREEN: CPT

## 2023-05-09 PROCEDURE — 86787 VARICELLA-ZOSTER ANTIBODY: CPT

## 2023-05-09 PROCEDURE — 93005 ELECTROCARDIOGRAM TRACING: CPT

## 2023-05-09 PROCEDURE — 86703 HIV-1/HIV-2 1 RESULT ANTBDY: CPT

## 2023-05-09 PROCEDURE — 84100 ASSAY OF PHOSPHORUS: CPT

## 2023-05-09 PROCEDURE — 86900 BLOOD TYPING SEROLOGIC ABO: CPT

## 2023-05-09 PROCEDURE — 85025 COMPLETE CBC W/AUTO DIFF WBC: CPT

## 2023-05-09 PROCEDURE — 80053 COMPREHEN METABOLIC PANEL: CPT

## 2023-05-09 PROCEDURE — 83735 ASSAY OF MAGNESIUM: CPT

## 2023-05-09 PROCEDURE — 83020 HEMOGLOBIN ELECTROPHORESIS: CPT

## 2023-05-09 PROCEDURE — 82010 KETONE BODYS QUAN: CPT

## 2023-05-09 RX ORDER — POTASSIUM CHLORIDE 20 MEQ
20 PACKET (EA) ORAL ONCE
Refills: 0 | Status: COMPLETED | OUTPATIENT
Start: 2023-05-09 | End: 2023-05-09

## 2023-05-09 RX ORDER — PANTOPRAZOLE SODIUM 20 MG/1
40 TABLET, DELAYED RELEASE ORAL DAILY
Refills: 0 | Status: DISCONTINUED | OUTPATIENT
Start: 2023-05-09 | End: 2023-05-12

## 2023-05-09 RX ORDER — ONDANSETRON 8 MG/1
4 TABLET, FILM COATED ORAL EVERY 6 HOURS
Refills: 0 | Status: DISCONTINUED | OUTPATIENT
Start: 2023-05-09 | End: 2023-05-16

## 2023-05-09 RX ORDER — PYRIDOXINE HCL (VITAMIN B6) 100 MG
25 TABLET ORAL EVERY 12 HOURS
Refills: 0 | Status: DISCONTINUED | OUTPATIENT
Start: 2023-05-09 | End: 2023-05-10

## 2023-05-09 RX ORDER — MAGNESIUM SULFATE 500 MG/ML
2 VIAL (ML) INJECTION ONCE
Refills: 0 | Status: COMPLETED | OUTPATIENT
Start: 2023-05-09 | End: 2023-05-09

## 2023-05-09 RX ORDER — CEFTRIAXONE 500 MG/1
1000 INJECTION, POWDER, FOR SOLUTION INTRAMUSCULAR; INTRAVENOUS EVERY 24 HOURS
Refills: 0 | Status: DISCONTINUED | OUTPATIENT
Start: 2023-05-09 | End: 2023-05-10

## 2023-05-09 RX ORDER — PROCHLORPERAZINE MALEATE 5 MG
25 TABLET ORAL EVERY 12 HOURS
Refills: 0 | Status: DISCONTINUED | OUTPATIENT
Start: 2023-05-09 | End: 2023-05-17

## 2023-05-09 RX ORDER — ACETAMINOPHEN 500 MG
650 TABLET ORAL EVERY 6 HOURS
Refills: 0 | Status: DISCONTINUED | OUTPATIENT
Start: 2023-05-09 | End: 2023-05-17

## 2023-05-09 RX ORDER — SODIUM CHLORIDE 9 MG/ML
1000 INJECTION, SOLUTION INTRAVENOUS
Refills: 0 | Status: DISCONTINUED | OUTPATIENT
Start: 2023-05-09 | End: 2023-05-10

## 2023-05-09 RX ORDER — DIPHENHYDRAMINE HCL 50 MG
25 CAPSULE ORAL EVERY 8 HOURS
Refills: 0 | Status: DISCONTINUED | OUTPATIENT
Start: 2023-05-09 | End: 2023-05-17

## 2023-05-09 RX ORDER — SUCRALFATE 1 G
1 TABLET ORAL
Refills: 0 | Status: DISCONTINUED | OUTPATIENT
Start: 2023-05-09 | End: 2023-05-17

## 2023-05-09 RX ADMIN — CEFTRIAXONE 100 MILLIGRAM(S): 500 INJECTION, POWDER, FOR SOLUTION INTRAMUSCULAR; INTRAVENOUS at 20:19

## 2023-05-09 RX ADMIN — PANTOPRAZOLE SODIUM 40 MILLIGRAM(S): 20 TABLET, DELAYED RELEASE ORAL at 12:19

## 2023-05-09 RX ADMIN — Medication 25 MILLIGRAM(S): at 17:19

## 2023-05-09 RX ADMIN — ONDANSETRON 4 MILLIGRAM(S): 8 TABLET, FILM COATED ORAL at 12:19

## 2023-05-09 RX ADMIN — Medication 1 GRAM(S): at 17:18

## 2023-05-09 RX ADMIN — ONDANSETRON 4 MILLIGRAM(S): 8 TABLET, FILM COATED ORAL at 05:59

## 2023-05-09 RX ADMIN — Medication 650 MILLIGRAM(S): at 17:44

## 2023-05-09 RX ADMIN — ONDANSETRON 4 MILLIGRAM(S): 8 TABLET, FILM COATED ORAL at 17:18

## 2023-05-09 RX ADMIN — Medication 20 MILLIEQUIVALENT(S): at 12:18

## 2023-05-09 RX ADMIN — Medication 25 MILLIGRAM(S): at 17:23

## 2023-05-09 RX ADMIN — Medication 25 GRAM(S): at 12:19

## 2023-05-09 RX ADMIN — SODIUM CHLORIDE 125 MILLILITER(S): 9 INJECTION, SOLUTION INTRAVENOUS at 20:18

## 2023-05-09 RX ADMIN — Medication 25 MILLIGRAM(S): at 06:00

## 2023-05-09 RX ADMIN — Medication 650 MILLIGRAM(S): at 16:44

## 2023-05-09 RX ADMIN — SODIUM CHLORIDE 125 MILLILITER(S): 9 INJECTION, SOLUTION INTRAVENOUS at 05:59

## 2023-05-09 NOTE — PROGRESS NOTE ADULT - ASSESSMENT
26 yo  @10w3d with persistent hyperemesis gravidarum and complicated UTI, for inpatient management    - admit to GYN service  - vitals q4 hours  - SCDs  - daily weights  - IVF hydration: First bag is multivitamin with folic acid and thiamine, Maintenance fluid of LR throughout the day  - regular diet as tolerated  - EKG to evaluate QTc  - if QTc not prolonged, will proceed with following meds: Zofran 4mg IVP q6hr, pyridoxine 25mg PO q12hr, sucralfate 1g PO daily, pantoprazole 40mg IV q12 hr   - replace electrolytes  - AM labs  - FHR prior to discharge    Dr. Tolbert and OB attending to be aware

## 2023-05-09 NOTE — PATIENT PROFILE ADULT - FALL HARM RISK - HARM RISK INTERVENTIONS

## 2023-05-09 NOTE — H&P ADULT - ASSESSMENT
28 yo  @10w3d with persistent hyperemesis gravidarum and complicated UTI, for inpatient management    - admit to GYN service  - vitals q4 hours  - SCDs  - daily weights  - IVF hydration: First bag is multivitamin with folic acid and thiamine, Maintenance fluid of LR throughout the day  - regular diet as tolerated  - EKG to evaluate QTc  - if QTc not prolonged, will proceed with following meds: Zofran 4mg IVP q6hr, pyridoxine 25mg PO q12hr, sucralfate 1g PO daily, pantoprazole 40mg IV q12 hr   -   - replace electrolytes  - AM labs  - FHR prior to discharge    Dr Hernandez and Dr Leone aware.

## 2023-05-09 NOTE — H&P ADULT - NSHPLABSRESULTS_GEN_ALL_CORE
14.0   8.40  )-----------( 279      ( 08 May 2023 20:02 )             40.5     05-08    137  |  102  |  9<L>  ----------------------------<  85  3.6   |  18  |  0.5<L>    Ca    9.5      08 May 2023 20:02  Mg     1.9     05-08    TPro  7.4  /  Alb  4.7  /  TBili  1.0  /  DBili  x   /  AST  32  /  ALT  58<H>  /  AlkPhos  91  05-08  HCG Quantitative, Serum (05.08.23 @ 20:02)   HCG Quantitative, Serum: 51718.0:    Urine Microscopic-Add On (NC) (05.08.23 @ 20:02)   Bacteria: Few  Comment - Urine: moderate yeast like cells  Squamous Epithelial Cells: 16 /HPF  Red Blood Cell - Urine: 2 /HPF  White Blood Cell - Urine: 24 /HPF  Hyaline Casts: 30 /LPF    < from: US Abdomen Upper Quadrant Right (05.09.23 @ 00:19) >    ACC: [id number] EXAM:  US ABDOMEN RT UPR QUADRANT   ORDERED BY: JONH NIETO     PROCEDURE DATE:  05/09/2023          INTERPRETATION:  CLINICAL INFORMATION: Right upper quadrant abdominal pain    COMPARISON: 4/28/2023    TECHNIQUE: Sonography of the right upper quadrant.    FINDINGS:  Liver: Increased liver parenchymal echogenicity  Bile ducts: Normal caliber. Common bile duct measures 3.3 mm.  Gallbladder: Gallbladder sludge. No stones visualized. No thickening or   pericholecystic fluid. Negative sonographic Lind's sign  Pancreas: Visualized portions are within normal limits.  Right kidney: Measures 11.4 cm in length. No hydronephrosis.  Ascites: None.  IVC: Visualized portions are within normal limits.    IMPRESSION:  Gallbladder sludge without sonographic evidence of acute cholecystitis.    Fatty infiltration of the liver    --- End of Report ---      ARMANDO ZAMORA MD; Attending Radiologist  This document has been electronically signed. May  9 2023 12:36AM    < end of copied text >      Historical Values  Urine Microscopic-Add On (NC) (05.08.23 @ 20:02)   Bacteria: Few  Comment - Urine: moderate yeast like cells  Squamous Epithelial Cells: 16 /HPF  Red Blood Cell - Urine: 2 /HPF  White Blood Cell - Urine: 24 /HPF  Hyaline Casts: 30 /LPF

## 2023-05-09 NOTE — H&P ADULT - NSHPPHYSICALEXAM_GEN_ALL_CORE
Vital Signs Last 24 Hrs  T(C): 36.8 (09 May 2023 00:13), Max: 36.8 (09 May 2023 00:13)  T(F): 98.3 (09 May 2023 00:13), Max: 98.3 (09 May 2023 00:13)  HR: 102 (09 May 2023 00:13) (102 - 122)  BP: 112/76 (09 May 2023 00:13) (109/72 - 112/76)  RR: 18 (09 May 2023 00:13) (18 - 18)  SpO2: 100% (09 May 2023 00:13) (99% - 100%)    Parameters below as of 09 May 2023 00:13  Patient On (Oxygen Delivery Method): room air    Gen; AAOx3, NAD  Lungs: CTABL  CVS: S1, S2, RRR  [place]: soft, tender to palpation RLQ, tender to palpation epigastrum, nondistended, BS+   Back: R CVA tenderness Vital Signs Last 24 Hrs  T(C): 36.8 (09 May 2023 00:13), Max: 36.8 (09 May 2023 00:13)  T(F): 98.3 (09 May 2023 00:13), Max: 98.3 (09 May 2023 00:13)  HR: 102 (09 May 2023 00:13) (102 - 122)  BP: 112/76 (09 May 2023 00:13) (109/72 - 112/76)  RR: 18 (09 May 2023 00:13) (18 - 18)  SpO2: 100% (09 May 2023 00:13) (99% - 100%)    Parameters below as of 09 May 2023 00:13  Patient On (Oxygen Delivery Method): room air    Gen; AAOx3, NAD  Lungs: CTABL  CVS: S1, S2, RRR  [place]: soft, tender to palpation RLQ, tender to palpation epigastrum, nondistended, BS+   Back: R CVA tenderness    BSS: variable presentation, FHR 167bpm.

## 2023-05-09 NOTE — PROGRESS NOTE ADULT - SUBJECTIVE AND OBJECTIVE BOX
PGY1 Note   #[id number]    Subjective  Patient reports that she continues to feel nausea, but she has not had emesis since arriving. She does not currently feel up to eating. She reports that her pain is the same and has not gotten better.    Objective  Vital Signs Last 24 Hrs  T(C): 36.7 (09 May 2023 06:05), Max: 36.8 (09 May 2023 00:13)  T(F): 98 (09 May 2023 06:05), Max: 98.3 (09 May 2023 00:13)  HR: 80 (09 May 2023 06:05) (80 - 122)  BP: 100/56 (09 May 2023 06:05) (100/56 - 112/76)  RR: 20 (09 May 2023 06:05) (18 - 20)  SpO2: 98% (09 May 2023 06:05) (98% - 100%)    Physical Exam  Gen; AAOx3, NAD  Lungs: CTABL  CVS: S1, S2, RRR  [place]: soft, tender to palpation RLQ, tender to palpation epigastrum, nondistended, BS+   Back: R CVA tenderness    MEDICATIONS  (STANDING):  cefTRIAXone   IVPB 1000 milliGRAM(s) IV Intermittent every 24 hours  dextrose 5% + sodium chloride 0.45%. 1000 milliLiter(s) (500 mL/Hr) IV Continuous <Continuous>  lactated ringers 1000 milliLiter(s) (125 mL/Hr) IV Continuous <Continuous>  ondansetron Injectable 4 milliGRAM(s) IV Push every 6 hours  pantoprazole  Injectable 40 milliGRAM(s) IV Push daily  pyridoxine 25 milliGRAM(s) Oral every 12 hours    Labs:                        14.0   8.40  )-----------( 279      ( 08 May 2023 20:02 )             40.5   05-08    137  |  102  |  9<L>  ----------------------------<  85  3.6   |  18  |  0.5<L>    Ca    9.5      08 May 2023 20:02  Mg     1.9     05-08    TPro  7.4  /  Alb  4.7  /  TBili  1.0  /  DBili  x   /  AST  32  /  ALT  58<H>  /  AlkPhos  91  05-08    Imaging  < from: US Abdomen Upper Quadrant Right (05.09.23 @ 00:19) >    ACC: [id number] EXAM:  US ABDOMEN RT UPR QUADRANT   ORDERED BY: JONH NIETO     PROCEDURE DATE:  05/09/2023          INTERPRETATION:  CLINICAL INFORMATION: Right upper quadrant abdominal pain    COMPARISON: 4/28/2023    TECHNIQUE: Sonography of the right upper quadrant.    FINDINGS:  Liver: Increased liver parenchymal echogenicity  Bile ducts: Normal caliber. Common bile duct measures 3.3 mm.  Gallbladder: Gallbladder sludge. No stones visualized. No thickening or   pericholecystic fluid. Negative sonographic Lind's sign  Pancreas: Visualized portions are within normal limits.  Right kidney: Measures 11.4 cm in length. No hydronephrosis.  Ascites: None.  IVC: Visualized portions are within normal limits.    IMPRESSION:  Gallbladder sludge without sonographic evidence of acute cholecystitis.    Fatty infiltration of the liver    --- End of Report ---            ARMANDO ZAMORA MD; Attending Radiologist  This document has been electronically signed. May  9 2023 12:36AM    < end of copied text >

## 2023-05-10 LAB
ALBUMIN SERPL ELPH-MCNC: 3.7 G/DL — SIGNIFICANT CHANGE UP (ref 3.5–5.2)
ALP SERPL-CCNC: 68 U/L — SIGNIFICANT CHANGE UP (ref 30–115)
ALT FLD-CCNC: 56 U/L — HIGH (ref 0–41)
ANION GAP SERPL CALC-SCNC: 12 MMOL/L — SIGNIFICANT CHANGE UP (ref 7–14)
AST SERPL-CCNC: 42 U/L — HIGH (ref 0–41)
B-OH-BUTYR SERPL-SCNC: 1.3 MMOL/L — HIGH
BASOPHILS # BLD AUTO: 0.04 K/UL — SIGNIFICANT CHANGE UP (ref 0–0.2)
BASOPHILS NFR BLD AUTO: 0.6 % — SIGNIFICANT CHANGE UP (ref 0–1)
BILIRUB SERPL-MCNC: 0.8 MG/DL — SIGNIFICANT CHANGE UP (ref 0.2–1.2)
BUN SERPL-MCNC: <3 MG/DL — LOW (ref 10–20)
CALCIUM SERPL-MCNC: 9 MG/DL — SIGNIFICANT CHANGE UP (ref 8.4–10.4)
CHLORIDE SERPL-SCNC: 106 MMOL/L — SIGNIFICANT CHANGE UP (ref 98–110)
CO2 SERPL-SCNC: 19 MMOL/L — SIGNIFICANT CHANGE UP (ref 17–32)
CREAT SERPL-MCNC: <0.5 MG/DL — LOW (ref 0.7–1.5)
CULTURE RESULTS: SIGNIFICANT CHANGE UP
EGFR: 148 ML/MIN/1.73M2 — SIGNIFICANT CHANGE UP
EOSINOPHIL # BLD AUTO: 0.11 K/UL — SIGNIFICANT CHANGE UP (ref 0–0.7)
EOSINOPHIL NFR BLD AUTO: 1.7 % — SIGNIFICANT CHANGE UP (ref 0–8)
GLUCOSE SERPL-MCNC: 73 MG/DL — SIGNIFICANT CHANGE UP (ref 70–99)
HCT VFR BLD CALC: 32.8 % — LOW (ref 37–47)
HGB BLD-MCNC: 11.5 G/DL — LOW (ref 12–16)
IMM GRANULOCYTES NFR BLD AUTO: 0.2 % — SIGNIFICANT CHANGE UP (ref 0.1–0.3)
LYMPHOCYTES # BLD AUTO: 2.14 K/UL — SIGNIFICANT CHANGE UP (ref 1.2–3.4)
LYMPHOCYTES # BLD AUTO: 33.8 % — SIGNIFICANT CHANGE UP (ref 20.5–51.1)
MAGNESIUM SERPL-MCNC: 1.7 MG/DL — LOW (ref 1.8–2.4)
MCHC RBC-ENTMCNC: 31.3 PG — HIGH (ref 27–31)
MCHC RBC-ENTMCNC: 35.1 G/DL — SIGNIFICANT CHANGE UP (ref 32–37)
MCV RBC AUTO: 89.1 FL — SIGNIFICANT CHANGE UP (ref 81–99)
MONOCYTES # BLD AUTO: 0.41 K/UL — SIGNIFICANT CHANGE UP (ref 0.1–0.6)
MONOCYTES NFR BLD AUTO: 6.5 % — SIGNIFICANT CHANGE UP (ref 1.7–9.3)
NEUTROPHILS # BLD AUTO: 3.62 K/UL — SIGNIFICANT CHANGE UP (ref 1.4–6.5)
NEUTROPHILS NFR BLD AUTO: 57.2 % — SIGNIFICANT CHANGE UP (ref 42.2–75.2)
NRBC # BLD: 0 /100 WBCS — SIGNIFICANT CHANGE UP (ref 0–0)
PHOSPHATE SERPL-MCNC: 3.2 MG/DL — SIGNIFICANT CHANGE UP (ref 2.1–4.9)
PLATELET # BLD AUTO: 239 K/UL — SIGNIFICANT CHANGE UP (ref 130–400)
PMV BLD: 11.5 FL — HIGH (ref 7.4–10.4)
POTASSIUM SERPL-MCNC: 3.5 MMOL/L — SIGNIFICANT CHANGE UP (ref 3.5–5)
POTASSIUM SERPL-SCNC: 3.5 MMOL/L — SIGNIFICANT CHANGE UP (ref 3.5–5)
PROT SERPL-MCNC: 5.9 G/DL — LOW (ref 6–8)
RBC # BLD: 3.68 M/UL — LOW (ref 4.2–5.4)
RBC # FLD: 12.6 % — SIGNIFICANT CHANGE UP (ref 11.5–14.5)
SODIUM SERPL-SCNC: 137 MMOL/L — SIGNIFICANT CHANGE UP (ref 135–146)
SPECIMEN SOURCE: SIGNIFICANT CHANGE UP
WBC # BLD: 6.33 K/UL — SIGNIFICANT CHANGE UP (ref 4.8–10.8)
WBC # FLD AUTO: 6.33 K/UL — SIGNIFICANT CHANGE UP (ref 4.8–10.8)

## 2023-05-10 PROCEDURE — 93010 ELECTROCARDIOGRAM REPORT: CPT

## 2023-05-10 RX ORDER — I.V. FAT EMULSION 20 G/100ML
1.63 EMULSION INTRAVENOUS
Qty: 100.04 | Refills: 0 | Status: DISCONTINUED | OUTPATIENT
Start: 2023-05-10 | End: 2023-05-10

## 2023-05-10 RX ORDER — ELECTROLYTE SOLUTION,INJ
1 VIAL (ML) INTRAVENOUS
Refills: 0 | Status: DISCONTINUED | OUTPATIENT
Start: 2023-05-10 | End: 2023-05-10

## 2023-05-10 RX ORDER — MAGNESIUM SULFATE 500 MG/ML
1 VIAL (ML) INJECTION ONCE
Refills: 0 | Status: COMPLETED | OUTPATIENT
Start: 2023-05-10 | End: 2023-05-10

## 2023-05-10 RX ORDER — PYRIDOXINE HCL (VITAMIN B6) 100 MG
25 TABLET ORAL
Refills: 0 | Status: DISCONTINUED | OUTPATIENT
Start: 2023-05-10 | End: 2023-05-17

## 2023-05-10 RX ADMIN — Medication 25 MILLIGRAM(S): at 17:08

## 2023-05-10 RX ADMIN — ONDANSETRON 4 MILLIGRAM(S): 8 TABLET, FILM COATED ORAL at 17:07

## 2023-05-10 RX ADMIN — PANTOPRAZOLE SODIUM 40 MILLIGRAM(S): 20 TABLET, DELAYED RELEASE ORAL at 11:19

## 2023-05-10 RX ADMIN — I.V. FAT EMULSION 31.3 GM/KG/DAY: 20 EMULSION INTRAVENOUS at 20:19

## 2023-05-10 RX ADMIN — ONDANSETRON 4 MILLIGRAM(S): 8 TABLET, FILM COATED ORAL at 06:25

## 2023-05-10 RX ADMIN — Medication 100 GRAM(S): at 11:20

## 2023-05-10 RX ADMIN — Medication 25 MILLIGRAM(S): at 21:44

## 2023-05-10 RX ADMIN — ONDANSETRON 4 MILLIGRAM(S): 8 TABLET, FILM COATED ORAL at 11:19

## 2023-05-10 RX ADMIN — Medication 1 EACH: at 20:20

## 2023-05-10 RX ADMIN — Medication 25 MILLIGRAM(S): at 06:25

## 2023-05-10 RX ADMIN — ONDANSETRON 4 MILLIGRAM(S): 8 TABLET, FILM COATED ORAL at 23:24

## 2023-05-10 RX ADMIN — Medication 1 GRAM(S): at 17:07

## 2023-05-10 NOTE — PROGRESS NOTE ADULT - ASSESSMENT
A/P: 26 yo  @10w4d with persistent hyperemesis gravidarum and complicated UTI, for inpatient management    - vitals q4 hours  - SCDs  - daily weights  - IVF hydration: First bag is multivitamin with folic acid and thiamine, Maintenance fluid of LR throughout the day  - regular diet as tolerated  - nutrition consult  - EKG to evaluate QTc  - if QTc not prolonged, will proceed with following meds: zofran 4mg q6h, protonix 40mg qd, b6 26mg BID, ceftriaxone 1g qd, carafate 1g qd, compazine 25mg BID, benadryl PRN, tylenol PRN  - replace electrolytes  - AM labs  - FHR prior to discharge    Dr. Tolbert and OB attending to be aware

## 2023-05-10 NOTE — CHART NOTE - NSCHARTNOTEFT_GEN_A_CORE
NUTRITION SUPPORT TEAM  -  CONSULT NOTE     28 yo  @10w3d by LMP (23) and first trim sono, presents to the ED with nausea and vomiting. Patient has had multiple ED visits and hospital admissions for similar complaints. She was admitted to [facility] from - and - for inpatient management of hyperemesis gravidarum. She was discharged home with compazine, pepcid, b6, zofran, protonix, sucralate, and miralax. Patient reports she has been taking medications as prescribed without improvement in nausea, and continues to vomit with any PO intake. Her last PO intake  was 2 days ago.  States she did not have any improvement of symptoms after discharge.  Also c/o R sided abdominal pain with radiation to her right flank. Patient has known UTI, but was unable to tolerate antibiotics.  Also complaining of constipation for 3 days.  Patient has had multiple hospital admissions and ED visits for nausea and vomitting in pregnancy.  During her first admission to [facility] () she was found to have cholelithiasis and mildly elevated LFTs. S/P Lap appendectomy early April.  Denies fevers, chills, diarrhea. Denies urinary symptoms. Denies discharge, vaginal bleeding.     Ob/Gyn History:                   LMP - 23             Endorses h/o ovarian cysts, Denies history of uterine fibroids, abnormal paps, or STIs (09 May 2023 00:29)      NUTRITION SUPPORT NOTE:    Pt known to service from previous admission. PPN given previous admission, pt improved after 48hrs of PPN and was d/c home. Presents now with similar presentation      REVIEW OF SYSTEMS:  Negative except as noted above.       PAST MEDICAL/SURGICAL HISTORY:   No pertinent past medical history  S/P appendectomy      ALLERGIES:  No Known Allergies    VITALS:  T(F): 98.3 (05-10 @ 11:00), Max: 98.3 (05-10 @ 11:00)  HR: 78 (05-10 @ 11:00) (72 - 78)  BP: 83/47 (05-10 @ 11:00) (83/47 - 90/56)  RR: 18 (05-10 @ 11:00) (18 - 18)  SpO2: 97% (05-10 @ 11:00) (97% - 97%)    HEIGHT/WEIGHT/BMI:   Height (cm): 152.4 (-), 152.4 (), 152.4 (), 152.4 ()  Weight (kg): 61.3 (), 59 (), 68 (-), 71 ()  BMI (kg/m2): 26.4 (-), 26.4 (), 26.4 (), 25.4 ()    PHYSICAL EXAM:   GENERAL: NAD, well-groomed, well-developed  HEENT: Moist mucous membranes, Good dentition, No lesions  ABDOMEN: Soft, Nontender, Nondistended  EXTREMITIES:  No clubbing, cyanosis, or edema  SKIN: warm and well perfused; No obvious rashes or lesions  IV ACCESS: peripheral  ENTERAL ACCESS: none    I/Os:     23 @ 07:01  -  05-10-23 @ 07:00  --------------------------------------------------------  IN:    IV PiggyBack: 50 mL    Lactated Ringers: 1000 mL    Lactated Ringers w/ Additives: 1000 mL  Total IN: 2050 mL    OUT:  Total OUT: 0 mL    Total NET: 2050 mL      STANDING MEDICATIONS:   acetaminophen     Tablet .. 650 milliGRAM(s) Oral every 6 hours PRN  diphenhydrAMINE Injectable 25 milliGRAM(s) IV Push every 8 hours PRN  lactated ringers 1000 milliLiter(s) IV Continuous <Continuous>  lactated ringers. 1000 milliLiter(s) IV Continuous <Continuous>  ondansetron Injectable 4 milliGRAM(s) IV Push every 6 hours  pantoprazole  Injectable 40 milliGRAM(s) IV Push daily  prochlorperazine Suppository 25 milliGRAM(s) Rectal every 12 hours  pyridoxine 25 milliGRAM(s) Oral every 12 hours  sucralfate 1 Gram(s) Oral <User Schedule>      LABS:                         11.5   6.33  )-----------( 239      ( 10 May 2023 07:35 )             32.8     137  |  106  |  <3<L>  ----------------------------<  73          (05-10-23 @ 07:35)  3.5   |  19  |  <0.5<L>    Ca    9.0          (05-10-23 @ 07:35)  Phos  3.2         (05-10-23 @ 07:35)  Mg     1.7         (05-10-23 @ 07:35)    TPro  5.9<L>  /  Alb  3.7  /  TBili  0.8  /  DBili  x   /  AST  42<H>  /  ALT  56<H>  /  AlkPhos  68       05-10-23 @ 07:35    Triglycerides, Serum: 88 mg/dL ( @ 06:52)    DIET:   Diet, Regular:   No Mixed Consistencies (23 @ 14:57) [Active]      ASSESSMENT  28 yo  @10w4d with persistent hyperemesis gravidarum and complicated UTI, for inpatient management    - severe wt loss, ~14% over past ~2 months  - severe protein calorie malnutrition  - hypomagnesemia      PLAN  - start PPN tonight  - d/c IVF's when PPN begins  - daily am bmp, in phos, mg while on parenteral nutrition  - pyridoxine should be given 25mg PO q8hrs NUTRITION SUPPORT TEAM  -  CONSULT NOTE     26 yo  @10w3d by LMP (23) and first trim sono, presents to the ED with nausea and vomiting. Patient has had multiple ED visits and hospital admissions for similar complaints. She was admitted to [facility] from - and - for inpatient management of hyperemesis gravidarum. She was discharged home with compazine, pepcid, b6, zofran, protonix, sucralate, and miralax. Patient reports she has been taking medications as prescribed without improvement in nausea, and continues to vomit with any PO intake. Her last PO intake  was 2 days ago.  States she did not have any improvement of symptoms after discharge.  Also c/o R sided abdominal pain with radiation to her right flank. Patient has known UTI, but was unable to tolerate antibiotics.  Also complaining of constipation for 3 days.  Patient has had multiple hospital admissions and ED visits for nausea and vomitting in pregnancy.  During her first admission to [facility] () she was found to have cholelithiasis and mildly elevated LFTs. S/P Lap appendectomy early April.  Denies fevers, chills, diarrhea. Denies urinary symptoms. Denies discharge, vaginal bleeding.     Ob/Gyn History:                   LMP - 23             Endorses h/o ovarian cysts, Denies history of uterine fibroids, abnormal paps, or STIs (09 May 2023 00:29)    NUTRITION SUPPORT NOTE:    Pt known to service from previous admission. PPN given previous admission, pt improved after 48hrs of PPN and was d/c home. Presents now with similar presentation    REVIEW OF SYSTEMS:  Negative except as noted above.     PAST MEDICAL/SURGICAL HISTORY:   No pertinent past medical history  S/P appendectomy    ALLERGIES:  No Known Allergies    VITALS:  T(F): 98.3 (05-10 @ 11:00), Max: 98.3 (05-10 @ 11:00)  HR: 78 (05-10 @ 11:00) (72 - 78)  BP: 83/47 (05-10 @ 11:00) (83/47 - 90/56)  RR: 18 (05-10 @ 11:00) (18 - 18)  SpO2: 97% (05-10 @ 11:00) (97% - 97%)    HEIGHT/WEIGHT/BMI:   Height (cm): 152.4 (-), 152.4 (), 152.4 (), 152.4 ()  Weight (kg): 61.3 (), 59 (), 68 (-), 71 ()  BMI (kg/m2): 26.4 (-), 26.4 (), 26.4 (), 25.4 ()    PHYSICAL EXAM:   GENERAL: NAD, well-groomed, well-developed, uncomfortable  HEENT: Moist mucous membranes, Good dentition, No lesions  ABDOMEN: Soft, Nontender, Nondistended  EXTREMITIES:  No clubbing, cyanosis, or edema  SKIN: warm and well perfused; No obvious rashes or lesions  IV ACCESS: peripheral  ENTERAL ACCESS: none    I/Os:     23 @ 07:01  -  05-10-23 @ 07:00  --------------------------------------------------------  IN:    IV PiggyBack: 50 mL    Lactated Ringers: 1000 mL    Lactated Ringers w/ Additives: 1000 mL  Total IN: 2050 mL    OUT:  Total OUT: 0 mL    Total NET: 2050 mL      STANDING MEDICATIONS:   acetaminophen     Tablet .. 650 milliGRAM(s) Oral every 6 hours PRN  diphenhydrAMINE Injectable 25 milliGRAM(s) IV Push every 8 hours PRN  lactated ringers 1000 milliLiter(s) IV Continuous <Continuous>  lactated ringers. 1000 milliLiter(s) IV Continuous <Continuous>  ondansetron Injectable 4 milliGRAM(s) IV Push every 6 hours  pantoprazole  Injectable 40 milliGRAM(s) IV Push daily  prochlorperazine Suppository 25 milliGRAM(s) Rectal every 12 hours  pyridoxine 25 milliGRAM(s) Oral every 12 hours  sucralfate 1 Gram(s) Oral <User Schedule>      LABS:                         11.5   6.33  )-----------( 239      ( 10 May 2023 07:35 )             32.8     137  |  106  |  <3<L>  ----------------------------<  73          (05-10-23 @ 07:35)  3.5   |  19  |  <0.5<L>    Ca    9.0          (05-10-23 @ 07:35)  Phos  3.2         (05-10-23 @ 07:35)  Mg     1.7         (05-10-23 @ 07:35)    TPro  5.9<L>  /  Alb  3.7  /  TBili  0.8  /  DBili  x   /  AST  42<H>  /  ALT  56<H>  /  AlkPhos  68       05-10-23 @ 07:35    Triglycerides, Serum: 88 mg/dL ( @ 06:52)    DIET:   Diet, Regular:   No Mixed Consistencies (23 @ 14:57) [Active]      ASSESSMENT  26 yo  @10w4d with persistent hyperemesis gravidarum and complicated UTI, for inpatient management  - severe wt loss, ~14% over past ~2 months  - severe protein calorie malnutrition  - hypomagnesemia    PLAN  - start PPN tonight  - d/c IVF's when PPN begins  - daily am bmp, in phos, mg while on parenteral nutrition  - pyridoxine should be given 25mg PO q8hrs

## 2023-05-10 NOTE — PROGRESS NOTE ADULT - SUBJECTIVE AND OBJECTIVE BOX
PGY1 Note   #[id number]    Subjective  Patient reports that she continues to feel nausea, and has emesis every time she eats. She reports that her pain is improving.    Objective  Vital Signs Last 24 Hrs  T(C): 36 (10 May 2023 00:00), Max: 36.1 (09 May 2023 08:34)  T(F): 96.8 (10 May 2023 00:00), Max: 97 (09 May 2023 08:34)  HR: 79 (10 May 2023 00:00) (76 - 95)  BP: 96/51 (10 May 2023 00:00) (94/60 - 98/52)  RR: 18 (10 May 2023 00:00) (18 - 18)  SpO2: 98% (10 May 2023 00:00) (97% - 100%)    Physical Exam  Gen; AAOx3, NAD  Lungs: CTABL  CVS: S1, S2, RRR  [place]: soft, minimally tender to palpation RLQ, tender to palpation epigastrum, nondistended, BS+     MEDICATIONS  (STANDING):  cefTRIAXone   IVPB 1000 milliGRAM(s) IV Intermittent every 24 hours  lactated ringers 1000 milliLiter(s) (125 mL/Hr) IV Continuous <Continuous>  lactated ringers. 1000 milliLiter(s) (125 mL/Hr) IV Continuous <Continuous>  ondansetron Injectable 4 milliGRAM(s) IV Push every 6 hours  pantoprazole  Injectable 40 milliGRAM(s) IV Push daily  prochlorperazine Suppository 25 milliGRAM(s) Rectal every 12 hours  pyridoxine 25 milliGRAM(s) Oral every 12 hours  sucralfate 1 Gram(s) Oral <User Schedule>    MEDICATIONS  (PRN):  acetaminophen     Tablet .. 650 milliGRAM(s) Oral every 6 hours PRN Temp greater or equal to 38C (100.4F), Mild Pain (1 - 3), Moderate Pain (4 - 6)  diphenhydrAMINE Injectable 25 milliGRAM(s) IV Push every 8 hours PRN nausea/vomiting    Labs:  4/30 7.06>12.5/35.3<216, 138/3.7/104/18/8/<0.5<77, AST/ALT 37/63, beta hydroxybutyrate 0.6  5/8 @2000 8.40>14/40.5<279, 137/3.6/102/18/9/0.5<85, AST/ALT 32/58, beta hydroxy buteryrate 2.2, Mg 1.9, lactate 1.3, UA LE pos, Large Ketones  UCx NG (final)   5/9 6.85>11.1/31.7<233, 138/3.4(repleted)/105/19/6/<0.5<78, AST/ALT 26/43, Mg 1.7 (repleted), Ph 3.8, beta hydroxy 0.9    Imaging  5/9 RUQ sono:  Gallbladder sludge without sonographic evidence of acute cholecystitis. Fatty infiltration of the liver  5/9 BSS: FHR 167bpm   5/9 EKG: QTc 464ms

## 2023-05-11 LAB
ALBUMIN SERPL ELPH-MCNC: 3.7 G/DL — SIGNIFICANT CHANGE UP (ref 3.5–5.2)
ALP SERPL-CCNC: 68 U/L — SIGNIFICANT CHANGE UP (ref 30–115)
ALT FLD-CCNC: 46 U/L — HIGH (ref 0–41)
ANION GAP SERPL CALC-SCNC: 14 MMOL/L — SIGNIFICANT CHANGE UP (ref 7–14)
AST SERPL-CCNC: 28 U/L — SIGNIFICANT CHANGE UP (ref 0–41)
B-OH-BUTYR SERPL-SCNC: 0.7 MMOL/L — HIGH
BASOPHILS # BLD AUTO: 0.03 K/UL — SIGNIFICANT CHANGE UP (ref 0–0.2)
BASOPHILS NFR BLD AUTO: 0.4 % — SIGNIFICANT CHANGE UP (ref 0–1)
BILIRUB SERPL-MCNC: 0.4 MG/DL — SIGNIFICANT CHANGE UP (ref 0.2–1.2)
BLD GP AB SCN SERPL QL: SIGNIFICANT CHANGE UP
BUN SERPL-MCNC: 4 MG/DL — LOW (ref 10–20)
CALCIUM SERPL-MCNC: 8.9 MG/DL — SIGNIFICANT CHANGE UP (ref 8.4–10.5)
CHLORIDE SERPL-SCNC: 106 MMOL/L — SIGNIFICANT CHANGE UP (ref 98–110)
CO2 SERPL-SCNC: 20 MMOL/L — SIGNIFICANT CHANGE UP (ref 17–32)
CREAT SERPL-MCNC: <0.5 MG/DL — LOW (ref 0.7–1.5)
EGFR: 138 ML/MIN/1.73M2 — SIGNIFICANT CHANGE UP
EOSINOPHIL # BLD AUTO: 0.17 K/UL — SIGNIFICANT CHANGE UP (ref 0–0.7)
EOSINOPHIL NFR BLD AUTO: 2.3 % — SIGNIFICANT CHANGE UP (ref 0–8)
GLUCOSE SERPL-MCNC: 106 MG/DL — HIGH (ref 70–99)
HCT VFR BLD CALC: 33.4 % — LOW (ref 37–47)
HGB BLD-MCNC: 11.8 G/DL — LOW (ref 12–16)
IMM GRANULOCYTES NFR BLD AUTO: 0.4 % — HIGH (ref 0.1–0.3)
LYMPHOCYTES # BLD AUTO: 1.9 K/UL — SIGNIFICANT CHANGE UP (ref 1.2–3.4)
LYMPHOCYTES # BLD AUTO: 26.1 % — SIGNIFICANT CHANGE UP (ref 20.5–51.1)
MAGNESIUM SERPL-MCNC: 1.9 MG/DL — SIGNIFICANT CHANGE UP (ref 1.8–2.4)
MCHC RBC-ENTMCNC: 30.7 PG — SIGNIFICANT CHANGE UP (ref 27–31)
MCHC RBC-ENTMCNC: 35.3 G/DL — SIGNIFICANT CHANGE UP (ref 32–37)
MCV RBC AUTO: 87 FL — SIGNIFICANT CHANGE UP (ref 81–99)
MONOCYTES # BLD AUTO: 0.45 K/UL — SIGNIFICANT CHANGE UP (ref 0.1–0.6)
MONOCYTES NFR BLD AUTO: 6.2 % — SIGNIFICANT CHANGE UP (ref 1.7–9.3)
NEUTROPHILS # BLD AUTO: 4.71 K/UL — SIGNIFICANT CHANGE UP (ref 1.4–6.5)
NEUTROPHILS NFR BLD AUTO: 64.6 % — SIGNIFICANT CHANGE UP (ref 42.2–75.2)
NRBC # BLD: 0 /100 WBCS — SIGNIFICANT CHANGE UP (ref 0–0)
PHOSPHATE SERPL-MCNC: 3.1 MG/DL — SIGNIFICANT CHANGE UP (ref 2.1–4.9)
PLATELET # BLD AUTO: 239 K/UL — SIGNIFICANT CHANGE UP (ref 130–400)
PMV BLD: 11.2 FL — HIGH (ref 7.4–10.4)
POTASSIUM SERPL-MCNC: 3.4 MMOL/L — LOW (ref 3.5–5)
POTASSIUM SERPL-SCNC: 3.4 MMOL/L — LOW (ref 3.5–5)
PROT SERPL-MCNC: 5.9 G/DL — LOW (ref 6–8)
RBC # BLD: 3.84 M/UL — LOW (ref 4.2–5.4)
RBC # FLD: 12.5 % — SIGNIFICANT CHANGE UP (ref 11.5–14.5)
SODIUM SERPL-SCNC: 140 MMOL/L — SIGNIFICANT CHANGE UP (ref 135–146)
WBC # BLD: 7.29 K/UL — SIGNIFICANT CHANGE UP (ref 4.8–10.8)
WBC # FLD AUTO: 7.29 K/UL — SIGNIFICANT CHANGE UP (ref 4.8–10.8)

## 2023-05-11 PROCEDURE — 76942 ECHO GUIDE FOR BIOPSY: CPT | Mod: 26

## 2023-05-11 PROCEDURE — 36000 PLACE NEEDLE IN VEIN: CPT

## 2023-05-11 RX ORDER — ELECTROLYTE SOLUTION,INJ
1 VIAL (ML) INTRAVENOUS
Refills: 0 | Status: DISCONTINUED | OUTPATIENT
Start: 2023-05-11 | End: 2023-05-11

## 2023-05-11 RX ORDER — I.V. FAT EMULSION 20 G/100ML
1.63 EMULSION INTRAVENOUS
Qty: 100.04 | Refills: 0 | Status: DISCONTINUED | OUTPATIENT
Start: 2023-05-11 | End: 2023-05-11

## 2023-05-11 RX ADMIN — ONDANSETRON 4 MILLIGRAM(S): 8 TABLET, FILM COATED ORAL at 05:28

## 2023-05-11 RX ADMIN — Medication 1 EACH: at 20:14

## 2023-05-11 RX ADMIN — ONDANSETRON 4 MILLIGRAM(S): 8 TABLET, FILM COATED ORAL at 18:09

## 2023-05-11 RX ADMIN — Medication 1 GRAM(S): at 18:09

## 2023-05-11 RX ADMIN — ONDANSETRON 4 MILLIGRAM(S): 8 TABLET, FILM COATED ORAL at 11:18

## 2023-05-11 RX ADMIN — Medication 25 MILLIGRAM(S): at 18:09

## 2023-05-11 RX ADMIN — Medication 25 MILLIGRAM(S): at 05:27

## 2023-05-11 RX ADMIN — I.V. FAT EMULSION 31.3 GM/KG/DAY: 20 EMULSION INTRAVENOUS at 20:13

## 2023-05-11 RX ADMIN — Medication 25 MILLIGRAM(S): at 13:29

## 2023-05-11 RX ADMIN — ONDANSETRON 4 MILLIGRAM(S): 8 TABLET, FILM COATED ORAL at 23:29

## 2023-05-11 RX ADMIN — Medication 25 MILLIGRAM(S): at 21:12

## 2023-05-11 RX ADMIN — PANTOPRAZOLE SODIUM 40 MILLIGRAM(S): 20 TABLET, DELAYED RELEASE ORAL at 11:18

## 2023-05-11 NOTE — PROCEDURE NOTE - SUPERVISORY STATEMENT
Procedure team was consulted to perform urgent midline for the administration of PPM. I was present for the key critical aspects of the procedure performed during the care of the patient. Utilizing ultrasound guidance, a midline catheter was inserted into the  R upper arm. Prior to needle insertion, the patency of the vein was confirmed with ultrasound.  The representative images are stored on the [employer] application.

## 2023-05-11 NOTE — PROCEDURE NOTE - ADDITIONAL PROCEDURE DETAILS
Procedure explained using  ID #[id number]  Procedure Team consulted for midline. Midline placed in RUE in sterile conditions using US guidance. Pt tolerated well.

## 2023-05-11 NOTE — PROGRESS NOTE ADULT - SUBJECTIVE AND OBJECTIVE BOX
PGY1 Note    Subjective  Patient reports that she continues to feel nausea, and has emesis every time she eats. She had 3 episodes of emesis over night. She reports that the pain in her side is improving.    Objective  Vital Signs Last 24 Hrs  T(C): 36.6 (11 May 2023 07:00), Max: 36.8 (10 May 2023 11:00)  T(F): 97.8 (11 May 2023 07:00), Max: 98.3 (10 May 2023 11:00)  HR: 85 (11 May 2023 07:00) (78 - 91)  BP: 93/56 (11 May 2023 07:00) (83/47 - 93/56)  RR: 18 (11 May 2023 07:00) (18 - 18)  SpO2: 97% (11 May 2023 07:00) (97% - 98%)    Physical Exam  Gen; AAOx3, NAD  Lungs: CTABL  CVS: S1, S2, RRR  [place]: soft, nontender, minimally tender to palpation epigastrum, nondistended, BS+     MEDICATIONS  (STANDING):  fat emulsion (Fish Oil and Plant Based) 20% Infusion 1.632 Gm/kG/Day (31.3 mL/Hr) IV Continuous <Continuous>  ondansetron Injectable 4 milliGRAM(s) IV Push every 6 hours  pantoprazole  Injectable 40 milliGRAM(s) IV Push daily  Parenteral Nutrition - Adult 1 Each (85 mL/Hr) TPN Continuous <Continuous>  prochlorperazine Suppository 25 milliGRAM(s) Rectal every 12 hours  pyridoxine 25 milliGRAM(s) Oral <User Schedule>  sucralfate 1 Gram(s) Oral <User Schedule>    MEDICATIONS  (PRN):  acetaminophen     Tablet .. 650 milliGRAM(s) Oral every 6 hours PRN Temp greater or equal to 38C (100.4F), Mild Pain (1 - 3), Moderate Pain (4 - 6)  diphenhydrAMINE Injectable 25 milliGRAM(s) IV Push every 8 hours PRN nausea/vomiting    Labs:  4/30 7.06>12.5/35.3<216, 138/3.7/104/18/8/<0.5<77, AST/ALT 37/63, beta hydroxybutyrate 0.6  5/8 @2000 8.40>14/40.5<279, 137/3.6/102/18/9/0.5<85, AST/ALT 32/58, beta hydroxy buteryrate 2.2, Mg 1.9, lactate 1.3, UA LE pos, Large Ketones  UCx NG (final)   5/9 6.85>11.1/31.7<233, 138/3.4(repleted)/105/19/6/<0.5<78, AST/ALT 26/43, Mg 1.7 (repleted), Ph 3.8, beta hydroxy 0.9  5/10 6.33>11.5/32.8<239, 137/106/19/3//0.5<73, AST/ALT 42/56, Mg 1.7, (repleted), Phos 3.2, beta hydroxy 1.3    Imaging  5/9 RUQ sono:  Gallbladder sludge without sonographic evidence of acute cholecystitis. Fatty infiltration of the liver  5/9 BSS: FHR 167bpm   5/9 EKG: QTc 464ms

## 2023-05-11 NOTE — PROGRESS NOTE ADULT - ASSESSMENT
A/P: 28 yo  @10w5d with persistent hyperemesis gravidarum, for inpatient management    - vitals q4 hours  - SCDs  - daily weights  - IVF hydration: First bag is multivitamin with folic acid and thiamine, Maintenance fluid of LR throughout the day  - regular diet as tolerated  - nutrition consult  - EKG to evaluate QTc  - if QTc not prolonged, will proceed with following meds: zofran 4mg q6h, protonix 40mg qd, b6 26mg BID, ceftriaxone 1g qd, carafate 1g qd, compazine 25mg BID, benadryl PRN, tylenol PRN  - replace electrolytes  - AM labs  - FHR prior to discharge    Dr. Tolbret and OB attending to be aware

## 2023-05-12 LAB
ANION GAP SERPL CALC-SCNC: 16 MMOL/L — HIGH (ref 7–14)
B-OH-BUTYR SERPL-SCNC: 0.5 MMOL/L — HIGH
BASOPHILS # BLD AUTO: 0.03 K/UL — SIGNIFICANT CHANGE UP (ref 0–0.2)
BASOPHILS NFR BLD AUTO: 0.4 % — SIGNIFICANT CHANGE UP (ref 0–1)
BUN SERPL-MCNC: 5 MG/DL — LOW (ref 10–20)
CALCIUM SERPL-MCNC: 8.9 MG/DL — SIGNIFICANT CHANGE UP (ref 8.4–10.5)
CHLORIDE SERPL-SCNC: 101 MMOL/L — SIGNIFICANT CHANGE UP (ref 98–110)
CO2 SERPL-SCNC: 18 MMOL/L — SIGNIFICANT CHANGE UP (ref 17–32)
CREAT SERPL-MCNC: <0.5 MG/DL — LOW (ref 0.7–1.5)
EGFR: 138 ML/MIN/1.73M2 — SIGNIFICANT CHANGE UP
EOSINOPHIL # BLD AUTO: 0.14 K/UL — SIGNIFICANT CHANGE UP (ref 0–0.7)
EOSINOPHIL NFR BLD AUTO: 1.8 % — SIGNIFICANT CHANGE UP (ref 0–8)
GLUCOSE SERPL-MCNC: 100 MG/DL — HIGH (ref 70–99)
HCT VFR BLD CALC: 32.6 % — LOW (ref 37–47)
HGB BLD-MCNC: 11.6 G/DL — LOW (ref 12–16)
IMM GRANULOCYTES NFR BLD AUTO: 0.3 % — SIGNIFICANT CHANGE UP (ref 0.1–0.3)
LYMPHOCYTES # BLD AUTO: 2.04 K/UL — SIGNIFICANT CHANGE UP (ref 1.2–3.4)
LYMPHOCYTES # BLD AUTO: 26.1 % — SIGNIFICANT CHANGE UP (ref 20.5–51.1)
MAGNESIUM SERPL-MCNC: 1.9 MG/DL — SIGNIFICANT CHANGE UP (ref 1.8–2.4)
MCHC RBC-ENTMCNC: 31 PG — SIGNIFICANT CHANGE UP (ref 27–31)
MCHC RBC-ENTMCNC: 35.6 G/DL — SIGNIFICANT CHANGE UP (ref 32–37)
MCV RBC AUTO: 87.2 FL — SIGNIFICANT CHANGE UP (ref 81–99)
MONOCYTES # BLD AUTO: 0.42 K/UL — SIGNIFICANT CHANGE UP (ref 0.1–0.6)
MONOCYTES NFR BLD AUTO: 5.4 % — SIGNIFICANT CHANGE UP (ref 1.7–9.3)
NEUTROPHILS # BLD AUTO: 5.17 K/UL — SIGNIFICANT CHANGE UP (ref 1.4–6.5)
NEUTROPHILS NFR BLD AUTO: 66 % — SIGNIFICANT CHANGE UP (ref 42.2–75.2)
NRBC # BLD: 0 /100 WBCS — SIGNIFICANT CHANGE UP (ref 0–0)
PHOSPHATE SERPL-MCNC: 3.9 MG/DL — SIGNIFICANT CHANGE UP (ref 2.1–4.9)
PLATELET # BLD AUTO: 215 K/UL — SIGNIFICANT CHANGE UP (ref 130–400)
PMV BLD: 11.5 FL — HIGH (ref 7.4–10.4)
POTASSIUM SERPL-MCNC: 3.5 MMOL/L — SIGNIFICANT CHANGE UP (ref 3.5–5)
POTASSIUM SERPL-SCNC: 3.5 MMOL/L — SIGNIFICANT CHANGE UP (ref 3.5–5)
RBC # BLD: 3.74 M/UL — LOW (ref 4.2–5.4)
RBC # FLD: 12.6 % — SIGNIFICANT CHANGE UP (ref 11.5–14.5)
SODIUM SERPL-SCNC: 135 MMOL/L — SIGNIFICANT CHANGE UP (ref 135–146)
WBC # BLD: 7.82 K/UL — SIGNIFICANT CHANGE UP (ref 4.8–10.8)
WBC # FLD AUTO: 7.82 K/UL — SIGNIFICANT CHANGE UP (ref 4.8–10.8)

## 2023-05-12 PROCEDURE — 99232 SBSQ HOSP IP/OBS MODERATE 35: CPT

## 2023-05-12 RX ORDER — PANTOPRAZOLE SODIUM 20 MG/1
40 TABLET, DELAYED RELEASE ORAL
Refills: 0 | Status: DISCONTINUED | OUTPATIENT
Start: 2023-05-12 | End: 2023-05-17

## 2023-05-12 RX ORDER — SENNA PLUS 8.6 MG/1
2 TABLET ORAL AT BEDTIME
Refills: 0 | Status: DISCONTINUED | OUTPATIENT
Start: 2023-05-12 | End: 2023-05-17

## 2023-05-12 RX ORDER — SIMETHICONE 80 MG/1
80 TABLET, CHEWABLE ORAL EVERY 12 HOURS
Refills: 0 | Status: DISCONTINUED | OUTPATIENT
Start: 2023-05-12 | End: 2023-05-17

## 2023-05-12 RX ORDER — ELECTROLYTE SOLUTION,INJ
1 VIAL (ML) INTRAVENOUS
Refills: 0 | Status: DISCONTINUED | OUTPATIENT
Start: 2023-05-12 | End: 2023-05-12

## 2023-05-12 RX ORDER — I.V. FAT EMULSION 20 G/100ML
1.63 EMULSION INTRAVENOUS
Qty: 100.04 | Refills: 0 | Status: DISCONTINUED | OUTPATIENT
Start: 2023-05-12 | End: 2023-05-12

## 2023-05-12 RX ADMIN — Medication 25 MILLIGRAM(S): at 22:02

## 2023-05-12 RX ADMIN — ONDANSETRON 4 MILLIGRAM(S): 8 TABLET, FILM COATED ORAL at 05:44

## 2023-05-12 RX ADMIN — Medication 25 MILLIGRAM(S): at 14:51

## 2023-05-12 RX ADMIN — ONDANSETRON 4 MILLIGRAM(S): 8 TABLET, FILM COATED ORAL at 17:45

## 2023-05-12 RX ADMIN — Medication 25 MILLIGRAM(S): at 05:46

## 2023-05-12 RX ADMIN — SIMETHICONE 80 MILLIGRAM(S): 80 TABLET, CHEWABLE ORAL at 17:44

## 2023-05-12 RX ADMIN — ONDANSETRON 4 MILLIGRAM(S): 8 TABLET, FILM COATED ORAL at 11:44

## 2023-05-12 RX ADMIN — Medication 1 EACH: at 20:47

## 2023-05-12 RX ADMIN — I.V. FAT EMULSION 31.3 GM/KG/DAY: 20 EMULSION INTRAVENOUS at 20:47

## 2023-05-12 RX ADMIN — SENNA PLUS 2 TABLET(S): 8.6 TABLET ORAL at 22:00

## 2023-05-12 RX ADMIN — ONDANSETRON 4 MILLIGRAM(S): 8 TABLET, FILM COATED ORAL at 23:14

## 2023-05-12 RX ADMIN — Medication 25 MILLIGRAM(S): at 17:44

## 2023-05-12 RX ADMIN — Medication 25 MILLIGRAM(S): at 05:44

## 2023-05-12 RX ADMIN — Medication 1 GRAM(S): at 17:44

## 2023-05-12 NOTE — PROVIDER CONTACT NOTE (OTHER) - SITUATION
ts BP: 93/55 P: 75
Pt noted with BP 83/47 heart rate 78. Pt asymptomatic on IVF continued at this time, MD Kendall made aware.

## 2023-05-12 NOTE — PROGRESS NOTE ADULT - ASSESSMENT
A/P: 26 yo  @11w0d with persistent hyperemesis gravidarum, for inpatient management    - vitals q4 hours  - SCDs, ambulation encouraged  - daily weights  - IVF hydration: First bag is multivitamin with folic acid and thiamine, Maintenance fluid of LR throughout the day  - regular diet as tolerated  - s/p nutrition consult - started on PPN on 5/10  - s/p midline placement on   - s/p SW consult: pt cleared to go home with her sister and has adequate social support, will needs med labels in Azeri   - current meds: IV zofran 4mg q6h, IV protonix 40mg qd, IV b6 26mg BID, PO carafate 1g qd, rectal compazine 25mg BID, benadryl PRN, tylenol PRN  - ceftriaxone d/amanuel on 5/10 as Ucx resulted negative   - AM labs ordered  - FHR prior to discharge    Dr. Tolbert and Dr. Mendes to be aware A/P: 28 yo  @11w0d with persistent hyperemesis gravidarum, for inpatient management    - vitals q4 hours  - SCDs, ambulation encouraged  - daily weights  - regular diet as tolerated  - s/p nutrition consult - started on PPN on 5/10  - s/p midline placement on   - s/p SW consult: pt cleared to go home with her sister and has adequate social support, will needs med labels in Kiswahili   - current meds: IV zofran 4mg q6h, IV protonix 40mg qd, IV b6 26mg BID, PO carafate 1g qd, rectal compazine 25mg BID, benadryl PRN, tylenol PRN  - ceftriaxone d/amanuel on 5/10 as Ucx resulted negative   - AM labs ordered  - FHR prior to discharge    Dr. Tolbert and Dr. Mendes to be aware A/P: 26 yo  @11w0d with persistent hyperemesis gravidarum, for inpatient management    - vitals q4 hours  - SCDs, ambulation encouraged  - daily weights  - regular diet as tolerated  - s/p nutrition consult - started on PPN on 5/10  - s/p midline placement on   - s/p SW consult: pt cleared to go home with her sister and has adequate social support, will needs med labels in Occitan   - current meds: IV zofran 4mg q6h, IV protonix 40mg qd, IV b6 26mg BID, PO carafate 1g qd, rectal compazine 25mg BID, benadryl PRN, tylenol PRN  - ceftriaxone d/amanuel on 5/10 as Ucx resulted negative   - senna, simethicone  - AM labs ordered  - FHR prior to discharge    Dr. Tolbert and Dr. Mendes to be aware

## 2023-05-12 NOTE — PROGRESS NOTE ADULT - SUBJECTIVE AND OBJECTIVE BOX
PGY2 NOTE   ID #[id number]    Gestational Age: 11w0d  Hospital Day: #5    HPI: Pt seen and examined at bedside. She is resting comfortably, receiving PPN. She denies abdominal pain/cramping, leakage of fluid, or vaginal bleeding/discharge. She is ambulating and voiding spontaneously. Has not had a BM since admission. Yesterday, she ate fruit throughout the day and did not vomit, but last night when she had dinner (salad, ice cream) she did feel nauseous and then vomit x1. This morning, she does not feel nauseous. Denies HA, lightheadedness, palpitations,  fevers, chills, CP, SOB, LE edema, urinary symptoms.     PAST MEDICAL & SURGICAL HISTORY:  No pertinent past medical history  S/P appendectomy    Vital Signs Last 24 Hrs  T(F): 98.6 (12 May 2023 04:23), Max: 98.6 (12 May 2023 04:23)  HR: 75 (12 May 2023 04:23) (75 - 92)  BP: 93/55 (12 May 2023 04:23) (89/50 - 96/56)  RR: 18 (12 May 2023 04:23) (18 - 18)    Physical exam:  General - AAOx3, NAD  Heart - S1S2, RRR  Lungs - CTA BL  Abdomen - Gravid, soft, nontender, no R/R/G  Extremities - No calf tenderness, no swelling    Labs:                        11.8   7.29  )-----------( 239      ( 11 May 2023 06:39 )             33.4                         11.5   6.33  )-----------( 239      ( 10 May 2023 07:35 )             32.8     140  |  106  |  4  ----------------------------<106  3.4  |  20  |  <0.5    Magnesium, Serum: 1.9 mg/dL (05-11-23 @ 06:39)    Phosphorus Level, Serum: 3.1 mg/dL (05-11-23 @ 06:39)    Antibody Screen: NEG (05-11-23 @ 06:39)

## 2023-05-13 LAB
ANION GAP SERPL CALC-SCNC: 14 MMOL/L — SIGNIFICANT CHANGE UP (ref 7–14)
BASOPHILS # BLD AUTO: 0.03 K/UL — SIGNIFICANT CHANGE UP (ref 0–0.2)
BASOPHILS NFR BLD AUTO: 0.4 % — SIGNIFICANT CHANGE UP (ref 0–1)
BUN SERPL-MCNC: 5 MG/DL — LOW (ref 10–20)
CALCIUM SERPL-MCNC: 9.2 MG/DL — SIGNIFICANT CHANGE UP (ref 8.4–10.4)
CHLORIDE SERPL-SCNC: 102 MMOL/L — SIGNIFICANT CHANGE UP (ref 98–110)
CO2 SERPL-SCNC: 18 MMOL/L — SIGNIFICANT CHANGE UP (ref 17–32)
CREAT SERPL-MCNC: <0.5 MG/DL — LOW (ref 0.7–1.5)
EGFR: 138 ML/MIN/1.73M2 — SIGNIFICANT CHANGE UP
EOSINOPHIL # BLD AUTO: 0.15 K/UL — SIGNIFICANT CHANGE UP (ref 0–0.7)
EOSINOPHIL NFR BLD AUTO: 2 % — SIGNIFICANT CHANGE UP (ref 0–8)
GLUCOSE SERPL-MCNC: 97 MG/DL — SIGNIFICANT CHANGE UP (ref 70–99)
HCT VFR BLD CALC: 33.1 % — LOW (ref 37–47)
HGB BLD-MCNC: 11.4 G/DL — LOW (ref 12–16)
IMM GRANULOCYTES NFR BLD AUTO: 0.4 % — HIGH (ref 0.1–0.3)
LYMPHOCYTES # BLD AUTO: 1.92 K/UL — SIGNIFICANT CHANGE UP (ref 1.2–3.4)
LYMPHOCYTES # BLD AUTO: 25.2 % — SIGNIFICANT CHANGE UP (ref 20.5–51.1)
MAGNESIUM SERPL-MCNC: 1.9 MG/DL — SIGNIFICANT CHANGE UP (ref 1.8–2.4)
MCHC RBC-ENTMCNC: 30.1 PG — SIGNIFICANT CHANGE UP (ref 27–31)
MCHC RBC-ENTMCNC: 34.4 G/DL — SIGNIFICANT CHANGE UP (ref 32–37)
MCV RBC AUTO: 87.3 FL — SIGNIFICANT CHANGE UP (ref 81–99)
MONOCYTES # BLD AUTO: 0.47 K/UL — SIGNIFICANT CHANGE UP (ref 0.1–0.6)
MONOCYTES NFR BLD AUTO: 6.2 % — SIGNIFICANT CHANGE UP (ref 1.7–9.3)
NEUTROPHILS # BLD AUTO: 5.03 K/UL — SIGNIFICANT CHANGE UP (ref 1.4–6.5)
NEUTROPHILS NFR BLD AUTO: 65.8 % — SIGNIFICANT CHANGE UP (ref 42.2–75.2)
NRBC # BLD: 0 /100 WBCS — SIGNIFICANT CHANGE UP (ref 0–0)
PHOSPHATE SERPL-MCNC: 4.1 MG/DL — SIGNIFICANT CHANGE UP (ref 2.1–4.9)
PLATELET # BLD AUTO: 222 K/UL — SIGNIFICANT CHANGE UP (ref 130–400)
PMV BLD: 11.9 FL — HIGH (ref 7.4–10.4)
POTASSIUM SERPL-MCNC: 3.5 MMOL/L — SIGNIFICANT CHANGE UP (ref 3.5–5)
POTASSIUM SERPL-SCNC: 3.5 MMOL/L — SIGNIFICANT CHANGE UP (ref 3.5–5)
RBC # BLD: 3.79 M/UL — LOW (ref 4.2–5.4)
RBC # FLD: 12.7 % — SIGNIFICANT CHANGE UP (ref 11.5–14.5)
SODIUM SERPL-SCNC: 134 MMOL/L — LOW (ref 135–146)
WBC # BLD: 7.63 K/UL — SIGNIFICANT CHANGE UP (ref 4.8–10.8)
WBC # FLD AUTO: 7.63 K/UL — SIGNIFICANT CHANGE UP (ref 4.8–10.8)

## 2023-05-13 RX ORDER — ELECTROLYTE SOLUTION,INJ
1 VIAL (ML) INTRAVENOUS
Refills: 0 | Status: DISCONTINUED | OUTPATIENT
Start: 2023-05-13 | End: 2023-05-13

## 2023-05-13 RX ORDER — I.V. FAT EMULSION 20 G/100ML
1.63 EMULSION INTRAVENOUS
Qty: 100.04 | Refills: 0 | Status: DISCONTINUED | OUTPATIENT
Start: 2023-05-13 | End: 2023-05-13

## 2023-05-13 RX ADMIN — ONDANSETRON 4 MILLIGRAM(S): 8 TABLET, FILM COATED ORAL at 17:04

## 2023-05-13 RX ADMIN — PANTOPRAZOLE SODIUM 40 MILLIGRAM(S): 20 TABLET, DELAYED RELEASE ORAL at 05:06

## 2023-05-13 RX ADMIN — Medication 25 MILLIGRAM(S): at 05:06

## 2023-05-13 RX ADMIN — ONDANSETRON 4 MILLIGRAM(S): 8 TABLET, FILM COATED ORAL at 12:19

## 2023-05-13 RX ADMIN — SIMETHICONE 80 MILLIGRAM(S): 80 TABLET, CHEWABLE ORAL at 17:04

## 2023-05-13 RX ADMIN — ONDANSETRON 4 MILLIGRAM(S): 8 TABLET, FILM COATED ORAL at 05:06

## 2023-05-13 RX ADMIN — SIMETHICONE 80 MILLIGRAM(S): 80 TABLET, CHEWABLE ORAL at 05:06

## 2023-05-13 RX ADMIN — I.V. FAT EMULSION 31.3 GM/KG/DAY: 20 EMULSION INTRAVENOUS at 20:44

## 2023-05-13 RX ADMIN — Medication 25 MILLIGRAM(S): at 12:18

## 2023-05-13 RX ADMIN — Medication 1 EACH: at 20:54

## 2023-05-13 RX ADMIN — Medication 1 GRAM(S): at 17:05

## 2023-05-13 RX ADMIN — Medication 25 MILLIGRAM(S): at 17:04

## 2023-05-13 NOTE — PROGRESS NOTE ADULT - SUBJECTIVE AND OBJECTIVE BOX
PGY2 Antepartum Note   Spoken with      Gestational Age: 11w1d  Hospital Day: #6    HPI: Patient seen and examined at bedside. She is resting comfortably, receiving PPN. Reports mild nausea. Tolerated a salad last night for dinner. Last episode of emesis was around 2300 yesterday. Reports she feels better since admission. Reports mild right flank pain, aggravated with movement. Patient unsure if pain is associated with feeds. Reports knee discomfort that began last night. Denies HA, lightheadedness, palpitations,  fevers, chills, CP, SOB, LE edema, urinary symptoms. Denies abdominal pain, vaginal bleeding.     PAST MEDICAL & SURGICAL HISTORY:  No pertinent past medical history  S/P appendectomy    Vital Signs Last 24 Hrs  Vital Signs Last 24 Hrs  T(C): 36.2 (13 May 2023 05:02), Max: 36.9 (12 May 2023 16:24)  T(F): 97.2 (13 May 2023 05:02), Max: 98.5 (12 May 2023 16:24)  HR: 79 (13 May 2023 00:00) (71 - 79)  BP: 83/53 (13 May 2023 05:02) (83/53 - 107/53)  RR: 18 (13 May 2023 05:02) (18 - 18)    Physical exam:  General - AAOx3, NAD  Abdomen - Gravid, soft, nontender  Extremities - No calf tenderness, no swelling    Labs:  4/30 7.06>12.5/35.3<216, 138/3.7/104/18/8/<0.5<77, AST/ALT 37/63, beta hydroxybutyrate 0.6  5/8 @2000 8.40>14/40.5<279, 137/3.6/102/18/9/0.5<85, AST/ALT 32/58, beta hydroxy buteryrate 2.2, Mg 1.9, lactate 1.3, UA LE pos, Large Ketones  UCx NG (final)   5/9 6.85>11.1/31.7<233, 138/3.4(repleted)/105/19/6/<0.5<78, AST/ALT 26/43, Mg 1.7 (repleted), Ph 3.8, beta hydroxy 0.9  5/10 6.33>11.5/32.8<239, 137/106/19/3/0.5<73, AST/ALT 42/56, Mg 1.7 (repleted), Phos 3.2, beta hydroxy-butyrate 1.3  5/11 7.29>11.8/33.4<239, 140/3.4/106/20/4/<0.5<106, AST/ALT 28/46, Ph 3.1, Mg 1.9, beta-hydroxy 0.7  5/12 7.82>11.6/32.6<215, 135/3.5/101/18/5/0.5<100, beta hydroxy 0.5, mag 1.9, phos 3.9

## 2023-05-13 NOTE — PROGRESS NOTE ADULT - ASSESSMENT
A/P: 28 yo  @11w1d with persistent hyperemesis gravidarum, for inpatient management  - ambulation, stretching, tylenol prn for knee pain  - vitals q4 hours  - SCDs, ambulation encouraged  - daily weights  - regular diet as tolerated  - s/p nutrition consult - started on PPN on 5/10  - s/p midline placement on   - s/p SW consult: pt cleared to go home with her sister and has adequate social support, will needs med labels in French   - current meds: IV zofran 4mg q6h, IV protonix 40mg qd, IV b6 26mg BID, PO carafate 1g qd, rectal compazine 25mg BID, benadryl PRN, tylenol PRN  - ceftriaxone d/amanuel on 5/10 as Ucx resulted negative   - senna, simethicone  - AM labs ordered  - FHR prior to discharge    Dr. Forrest and Dr. Bedoya to be aware

## 2023-05-14 LAB
ALBUMIN SERPL ELPH-MCNC: 3.9 G/DL — SIGNIFICANT CHANGE UP (ref 3.5–5.2)
ALP SERPL-CCNC: 64 U/L — SIGNIFICANT CHANGE UP (ref 30–115)
ALT FLD-CCNC: 37 U/L — SIGNIFICANT CHANGE UP (ref 0–41)
ANION GAP SERPL CALC-SCNC: 11 MMOL/L — SIGNIFICANT CHANGE UP (ref 7–14)
AST SERPL-CCNC: 25 U/L — SIGNIFICANT CHANGE UP (ref 0–41)
BASOPHILS # BLD AUTO: 0.03 K/UL — SIGNIFICANT CHANGE UP (ref 0–0.2)
BASOPHILS NFR BLD AUTO: 0.4 % — SIGNIFICANT CHANGE UP (ref 0–1)
BILIRUB SERPL-MCNC: 0.3 MG/DL — SIGNIFICANT CHANGE UP (ref 0.2–1.2)
BLD GP AB SCN SERPL QL: SIGNIFICANT CHANGE UP
BUN SERPL-MCNC: 5 MG/DL — LOW (ref 10–20)
CALCIUM SERPL-MCNC: 9.1 MG/DL — SIGNIFICANT CHANGE UP (ref 8.4–10.5)
CHLORIDE SERPL-SCNC: 104 MMOL/L — SIGNIFICANT CHANGE UP (ref 98–110)
CO2 SERPL-SCNC: 20 MMOL/L — SIGNIFICANT CHANGE UP (ref 17–32)
CREAT SERPL-MCNC: <0.5 MG/DL — LOW (ref 0.7–1.5)
EGFR: 138 ML/MIN/1.73M2 — SIGNIFICANT CHANGE UP
EOSINOPHIL # BLD AUTO: 0.17 K/UL — SIGNIFICANT CHANGE UP (ref 0–0.7)
EOSINOPHIL NFR BLD AUTO: 2.1 % — SIGNIFICANT CHANGE UP (ref 0–8)
GLUCOSE SERPL-MCNC: 105 MG/DL — HIGH (ref 70–99)
HCT VFR BLD CALC: 33 % — LOW (ref 37–47)
HGB BLD-MCNC: 11.3 G/DL — LOW (ref 12–16)
IMM GRANULOCYTES NFR BLD AUTO: 0.4 % — HIGH (ref 0.1–0.3)
LYMPHOCYTES # BLD AUTO: 1.71 K/UL — SIGNIFICANT CHANGE UP (ref 1.2–3.4)
LYMPHOCYTES # BLD AUTO: 21.4 % — SIGNIFICANT CHANGE UP (ref 20.5–51.1)
MAGNESIUM SERPL-MCNC: 2 MG/DL — SIGNIFICANT CHANGE UP (ref 1.8–2.4)
MCHC RBC-ENTMCNC: 30.1 PG — SIGNIFICANT CHANGE UP (ref 27–31)
MCHC RBC-ENTMCNC: 34.2 G/DL — SIGNIFICANT CHANGE UP (ref 32–37)
MCV RBC AUTO: 88 FL — SIGNIFICANT CHANGE UP (ref 81–99)
MONOCYTES # BLD AUTO: 0.52 K/UL — SIGNIFICANT CHANGE UP (ref 0.1–0.6)
MONOCYTES NFR BLD AUTO: 6.5 % — SIGNIFICANT CHANGE UP (ref 1.7–9.3)
NEUTROPHILS # BLD AUTO: 5.52 K/UL — SIGNIFICANT CHANGE UP (ref 1.4–6.5)
NEUTROPHILS NFR BLD AUTO: 69.2 % — SIGNIFICANT CHANGE UP (ref 42.2–75.2)
NRBC # BLD: 0 /100 WBCS — SIGNIFICANT CHANGE UP (ref 0–0)
PHOSPHATE SERPL-MCNC: 3.9 MG/DL — SIGNIFICANT CHANGE UP (ref 2.1–4.9)
PLATELET # BLD AUTO: 217 K/UL — SIGNIFICANT CHANGE UP (ref 130–400)
PMV BLD: 12.1 FL — HIGH (ref 7.4–10.4)
POTASSIUM SERPL-MCNC: 3.7 MMOL/L — SIGNIFICANT CHANGE UP (ref 3.5–5)
POTASSIUM SERPL-SCNC: 3.7 MMOL/L — SIGNIFICANT CHANGE UP (ref 3.5–5)
PRENATAL SYPHILIS TEST: SIGNIFICANT CHANGE UP
PROT SERPL-MCNC: 6 G/DL — SIGNIFICANT CHANGE UP (ref 6–8)
RBC # BLD: 3.75 M/UL — LOW (ref 4.2–5.4)
RBC # FLD: 12.8 % — SIGNIFICANT CHANGE UP (ref 11.5–14.5)
SODIUM SERPL-SCNC: 135 MMOL/L — SIGNIFICANT CHANGE UP (ref 135–146)
WBC # BLD: 7.98 K/UL — SIGNIFICANT CHANGE UP (ref 4.8–10.8)
WBC # FLD AUTO: 7.98 K/UL — SIGNIFICANT CHANGE UP (ref 4.8–10.8)

## 2023-05-14 PROCEDURE — 93010 ELECTROCARDIOGRAM REPORT: CPT

## 2023-05-14 PROCEDURE — 99232 SBSQ HOSP IP/OBS MODERATE 35: CPT

## 2023-05-14 RX ORDER — ELECTROLYTE SOLUTION,INJ
1 VIAL (ML) INTRAVENOUS
Refills: 0 | Status: DISCONTINUED | OUTPATIENT
Start: 2023-05-14 | End: 2023-05-14

## 2023-05-14 RX ORDER — I.V. FAT EMULSION 20 G/100ML
1.63 EMULSION INTRAVENOUS
Qty: 100.04 | Refills: 0 | Status: DISCONTINUED | OUTPATIENT
Start: 2023-05-14 | End: 2023-05-14

## 2023-05-14 RX ADMIN — Medication 25 MILLIGRAM(S): at 06:24

## 2023-05-14 RX ADMIN — Medication 25 MILLIGRAM(S): at 21:16

## 2023-05-14 RX ADMIN — Medication 1 EACH: at 20:48

## 2023-05-14 RX ADMIN — PANTOPRAZOLE SODIUM 40 MILLIGRAM(S): 20 TABLET, DELAYED RELEASE ORAL at 06:28

## 2023-05-14 RX ADMIN — Medication 25 MILLIGRAM(S): at 15:17

## 2023-05-14 RX ADMIN — ONDANSETRON 4 MILLIGRAM(S): 8 TABLET, FILM COATED ORAL at 23:06

## 2023-05-14 RX ADMIN — ONDANSETRON 4 MILLIGRAM(S): 8 TABLET, FILM COATED ORAL at 00:20

## 2023-05-14 RX ADMIN — Medication 25 MILLIGRAM(S): at 06:25

## 2023-05-14 RX ADMIN — SENNA PLUS 2 TABLET(S): 8.6 TABLET ORAL at 21:17

## 2023-05-14 RX ADMIN — Medication 650 MILLIGRAM(S): at 23:13

## 2023-05-14 RX ADMIN — Medication 25 MILLIGRAM(S): at 17:33

## 2023-05-14 RX ADMIN — SIMETHICONE 80 MILLIGRAM(S): 80 TABLET, CHEWABLE ORAL at 17:33

## 2023-05-14 RX ADMIN — I.V. FAT EMULSION 31.3 GM/KG/DAY: 20 EMULSION INTRAVENOUS at 20:48

## 2023-05-14 RX ADMIN — ONDANSETRON 4 MILLIGRAM(S): 8 TABLET, FILM COATED ORAL at 17:33

## 2023-05-14 RX ADMIN — ONDANSETRON 4 MILLIGRAM(S): 8 TABLET, FILM COATED ORAL at 11:57

## 2023-05-14 RX ADMIN — SIMETHICONE 80 MILLIGRAM(S): 80 TABLET, CHEWABLE ORAL at 06:24

## 2023-05-14 RX ADMIN — Medication 1 GRAM(S): at 17:33

## 2023-05-14 NOTE — PROGRESS NOTE ADULT - SUBJECTIVE AND OBJECTIVE BOX
PGY2 Antepartum Note   Spoken with      Gestational Age: 11w2d  Hospital Day: #7    HPI: Patient seen and examined at bedside. She is resting comfortably, receiving PPN. Reports mild nausea. Tolerated chicken soup last night for dinner. Vomited 1-2 hours after eating. Total emesis episodes yesterday was 2. Last episode around 2200. Denies any pain. Denies HA, lightheadedness, palpitations,  fevers, chills, CP, SOB, LE edema, urinary symptoms. Denies abdominal pain, vaginal bleeding.     PAST MEDICAL & SURGICAL HISTORY:  No pertinent past medical history  S/P appendectomy    Vital Signs Last 24 Hrs  Vital Signs Last 24 Hrs  T(C): 36.3 (14 May 2023 05:12), Max: 36.8 (13 May 2023 20:27)  T(F): 97.4 (14 May 2023 05:12), Max: 98.2 (13 May 2023 20:27)  HR: 82 (14 May 2023 05:12) (73 - 89)  BP: 88/51 (14 May 2023 05:12) (88/51 - 98/53)  RR: 18 (14 May 2023 05:12) (17 - 18)    Physical exam:  General - AAOx3, NAD  Abdomen - Gravid, soft, nontender  Extremities - No calf tenderness, no swelling    Labs:  4/30 7.06>12.5/35.3<216, 138/3.7/104/18/8/<0.5<77, AST/ALT 37/63, beta hydroxybutyrate 0.6  5/8 @2000 8.40>14/40.5<279, 137/3.6/102/18/9/0.5<85, AST/ALT 32/58, beta hydroxy buteryrate 2.2, Mg 1.9, lactate 1.3, UA LE pos, Large Ketones  UCx NG (final)   5/9 6.85>11.1/31.7<233, 138/3.4(repleted)/105/19/6/<0.5<78, AST/ALT 26/43, Mg 1.7 (repleted), Ph 3.8, beta hydroxy 0.9  5/10 6.33>11.5/32.8<239, 137/106/19/3/0.5<73, AST/ALT 42/56, Mg 1.7 (repleted), Phos 3.2, beta hydroxy-butyrate 1.3  5/11 7.29>11.8/33.4<239, 140/3.4/106/20/4/<0.5<106, AST/ALT 28/46, Ph 3.1, Mg 1.9, beta-hydroxy 0.7  5/12 7.82>11.6/32.6<215, 135/3.5/101/18/5/0.5<100, beta hydroxy 0.5, mag 1.9, phos 3.9

## 2023-05-14 NOTE — PROGRESS NOTE ADULT - ASSESSMENT
28 yo  @11w2d with persistent hyperemesis gravidarum, for inpatient management.    - ambulation, stretching, tylenol prn for knee pain  - vitals q4 hours  - SCDs, ambulation encouraged  - daily weights  - regular diet as tolerated  - s/p nutrition consult - started on PPN on 5/10  - s/p midline placement on   - s/p SW consult: pt cleared to go home with her sister and has adequate social support, will needs med labels in [place]   - current meds: IV zofran 4mg q6h, IV protonix 40mg qd, IV b6 26mg BID, PO carafate 1g qd, rectal compazine 25mg BID, benadryl PRN, tylenol PRN  - ceftriaxone d/amanuel on 5/10 as Ucx resulted negative   - senna, simethicone  - AM labs ordered  - FHR prior to discharge    Dr. Riggs and Dr. Lopez to be aware 26 yo  @11w2d with persistent hyperemesis gravidarum, for inpatient management.    - pt needs routine prenatal care as she has been in hospital prolonged time: she needs missing prenatal labs (ordered)  - arrange nuchal transucency to be done since there is no discharge plan yet, pt is still on PPN  - NIPT and carrier screening to be done tomorrow (5/15)  - ambulation, stretching, tylenol prn for knee pain  - vitals q4 hours  - SCDs, ambulation encouraged  - daily weights  - advance diet as tolerated  - s/p nutrition consult - started on PPN on 5/10  - s/p midline placement on   - s/p SW consult: pt cleared to go home with her sister and has adequate social support, will needs med labels in Polish   - current meds: IV zofran 4mg q6h, IV protonix 40mg qd, IV b6 26mg BID, PO carafate 1g qd, rectal compazine 25mg BID, benadryl PRN, tylenol PRN  - ceftriaxone d/amanuel on 5/10 as Ucx resulted negative   - senna, simethicone

## 2023-05-15 LAB
A1C WITH ESTIMATED AVERAGE GLUCOSE RESULT: 5 % — SIGNIFICANT CHANGE UP (ref 4–5.6)
ANION GAP SERPL CALC-SCNC: 13 MMOL/L — SIGNIFICANT CHANGE UP (ref 7–14)
BASOPHILS # BLD AUTO: 0.04 K/UL — SIGNIFICANT CHANGE UP (ref 0–0.2)
BASOPHILS NFR BLD AUTO: 0.5 % — SIGNIFICANT CHANGE UP (ref 0–1)
BUN SERPL-MCNC: 5 MG/DL — LOW (ref 10–20)
CALCIUM SERPL-MCNC: 9.2 MG/DL — SIGNIFICANT CHANGE UP (ref 8.4–10.5)
CHLORIDE SERPL-SCNC: 104 MMOL/L — SIGNIFICANT CHANGE UP (ref 98–110)
CO2 SERPL-SCNC: 21 MMOL/L — SIGNIFICANT CHANGE UP (ref 17–32)
CREAT SERPL-MCNC: <0.5 MG/DL — LOW (ref 0.7–1.5)
EGFR: 138 ML/MIN/1.73M2 — SIGNIFICANT CHANGE UP
EOSINOPHIL # BLD AUTO: 0.19 K/UL — SIGNIFICANT CHANGE UP (ref 0–0.7)
EOSINOPHIL NFR BLD AUTO: 2.2 % — SIGNIFICANT CHANGE UP (ref 0–8)
ESTIMATED AVERAGE GLUCOSE: 97 MG/DL — SIGNIFICANT CHANGE UP (ref 68–114)
GLUCOSE SERPL-MCNC: 103 MG/DL — HIGH (ref 70–99)
HCT VFR BLD CALC: 33.1 % — LOW (ref 37–47)
HGB BLD-MCNC: 11.5 G/DL — LOW (ref 12–16)
HIV 1 & 2 AB SERPL IA.RAPID: SIGNIFICANT CHANGE UP
IMM GRANULOCYTES NFR BLD AUTO: 0.3 % — SIGNIFICANT CHANGE UP (ref 0.1–0.3)
LYMPHOCYTES # BLD AUTO: 2.35 K/UL — SIGNIFICANT CHANGE UP (ref 1.2–3.4)
LYMPHOCYTES # BLD AUTO: 26.9 % — SIGNIFICANT CHANGE UP (ref 20.5–51.1)
MAGNESIUM SERPL-MCNC: 2 MG/DL — SIGNIFICANT CHANGE UP (ref 1.8–2.4)
MCHC RBC-ENTMCNC: 31 PG — SIGNIFICANT CHANGE UP (ref 27–31)
MCHC RBC-ENTMCNC: 34.7 G/DL — SIGNIFICANT CHANGE UP (ref 32–37)
MCV RBC AUTO: 89.2 FL — SIGNIFICANT CHANGE UP (ref 81–99)
MONOCYTES # BLD AUTO: 0.58 K/UL — SIGNIFICANT CHANGE UP (ref 0.1–0.6)
MONOCYTES NFR BLD AUTO: 6.7 % — SIGNIFICANT CHANGE UP (ref 1.7–9.3)
NEUTROPHILS # BLD AUTO: 5.53 K/UL — SIGNIFICANT CHANGE UP (ref 1.4–6.5)
NEUTROPHILS NFR BLD AUTO: 63.4 % — SIGNIFICANT CHANGE UP (ref 42.2–75.2)
NRBC # BLD: 0 /100 WBCS — SIGNIFICANT CHANGE UP (ref 0–0)
PHOSPHATE SERPL-MCNC: 4.6 MG/DL — SIGNIFICANT CHANGE UP (ref 2.1–4.9)
PLATELET # BLD AUTO: 210 K/UL — SIGNIFICANT CHANGE UP (ref 130–400)
PMV BLD: 12 FL — HIGH (ref 7.4–10.4)
POTASSIUM SERPL-MCNC: 3.5 MMOL/L — SIGNIFICANT CHANGE UP (ref 3.5–5)
POTASSIUM SERPL-SCNC: 3.5 MMOL/L — SIGNIFICANT CHANGE UP (ref 3.5–5)
RBC # BLD: 3.71 M/UL — LOW (ref 4.2–5.4)
RBC # FLD: 12.9 % — SIGNIFICANT CHANGE UP (ref 11.5–14.5)
SODIUM SERPL-SCNC: 138 MMOL/L — SIGNIFICANT CHANGE UP (ref 135–146)
VZV IGG FLD QL IA: <10 INDEX — SIGNIFICANT CHANGE UP
VZV IGG FLD QL IA: NEGATIVE — SIGNIFICANT CHANGE UP
WBC # BLD: 8.72 K/UL — SIGNIFICANT CHANGE UP (ref 4.8–10.8)
WBC # FLD AUTO: 8.72 K/UL — SIGNIFICANT CHANGE UP (ref 4.8–10.8)

## 2023-05-15 PROCEDURE — 99232 SBSQ HOSP IP/OBS MODERATE 35: CPT

## 2023-05-15 PROCEDURE — 83020 HEMOGLOBIN ELECTROPHORESIS: CPT | Mod: 26

## 2023-05-15 RX ORDER — ELECTROLYTE SOLUTION,INJ
1 VIAL (ML) INTRAVENOUS
Refills: 0 | Status: DISCONTINUED | OUTPATIENT
Start: 2023-05-15 | End: 2023-05-15

## 2023-05-15 RX ORDER — I.V. FAT EMULSION 20 G/100ML
1.63 EMULSION INTRAVENOUS
Qty: 100.04 | Refills: 0 | Status: DISCONTINUED | OUTPATIENT
Start: 2023-05-15 | End: 2023-05-15

## 2023-05-15 RX ADMIN — Medication 25 MILLIGRAM(S): at 23:46

## 2023-05-15 RX ADMIN — Medication 25 MILLIGRAM(S): at 14:53

## 2023-05-15 RX ADMIN — ONDANSETRON 4 MILLIGRAM(S): 8 TABLET, FILM COATED ORAL at 05:40

## 2023-05-15 RX ADMIN — SENNA PLUS 2 TABLET(S): 8.6 TABLET ORAL at 23:45

## 2023-05-15 RX ADMIN — Medication 25 MILLIGRAM(S): at 05:40

## 2023-05-15 RX ADMIN — SIMETHICONE 80 MILLIGRAM(S): 80 TABLET, CHEWABLE ORAL at 18:01

## 2023-05-15 RX ADMIN — PANTOPRAZOLE SODIUM 40 MILLIGRAM(S): 20 TABLET, DELAYED RELEASE ORAL at 05:39

## 2023-05-15 RX ADMIN — ONDANSETRON 4 MILLIGRAM(S): 8 TABLET, FILM COATED ORAL at 18:02

## 2023-05-15 RX ADMIN — Medication 1 GRAM(S): at 18:01

## 2023-05-15 RX ADMIN — I.V. FAT EMULSION 31.3 GM/KG/DAY: 20 EMULSION INTRAVENOUS at 20:44

## 2023-05-15 RX ADMIN — ONDANSETRON 4 MILLIGRAM(S): 8 TABLET, FILM COATED ORAL at 11:44

## 2023-05-15 RX ADMIN — SIMETHICONE 80 MILLIGRAM(S): 80 TABLET, CHEWABLE ORAL at 05:40

## 2023-05-15 RX ADMIN — Medication 1 EACH: at 20:45

## 2023-05-15 RX ADMIN — Medication 25 MILLIGRAM(S): at 18:02

## 2023-05-15 RX ADMIN — ONDANSETRON 4 MILLIGRAM(S): 8 TABLET, FILM COATED ORAL at 23:45

## 2023-05-15 NOTE — PROGRESS NOTE ADULT - SUBJECTIVE AND OBJECTIVE BOX
PGY1 Antepartum Note    #[id number]    Gestational Age: 11w3d  Hospital Day: #8    HPI: Patient seen and examined at bedside. She is resting comfortably, receiving PPN. Denies nausea. Tolerated a banana and yogurt during the day, but did not tolerate dinner. Total emesis episodes yesterday was 1. Last episode around 2200. Denies any pain. Denies HA, lightheadedness, palpitations,  fevers, chills, CP, SOB, LE edema, urinary symptoms. Denies abdominal pain, vaginal bleeding.     PAST MEDICAL & SURGICAL HISTORY:  No pertinent past medical history  S/P appendectomy    Vital Signs Last 24 Hrs  T(C): 36.4 (15 May 2023 05:10), Max: 36.5 (14 May 2023 08:22)  T(F): 97.5 (15 May 2023 05:10), Max: 97.7 (14 May 2023 08:22)  HR: 82 (15 May 2023 05:10) (76 - 82)  BP: 94/54 (15 May 2023 05:10) (86/53 - 96/56)  RR: 18 (15 May 2023 05:10) (18 - 18)  SpO2: 100% (15 May 2023 05:10) (100% - 100%)    Physical exam:  General - AAOx3, NAD  Abdomen - Gravid, soft, nontender  Extremities - No calf tenderness, no swelling    Labs:  4/30 7.06>12.5/35.3<216, 138/3.7/104/18/8/<0.5<77, AST/ALT 37/63, beta hydroxybutyrate 0.6  5/8 @2000 8.40>14/40.5<279, 137/3.6/102/18/9/0.5<85, AST/ALT 32/58, beta hydroxy buteryrate 2.2, Mg 1.9, lactate 1.3, UA LE pos, Large Ketones  UCx NG (final)   5/9 6.85>11.1/31.7<233, 138/3.4(repleted)/105/19/6/<0.5<78, AST/ALT 26/43, Mg 1.7 (repleted), Ph 3.8, beta hydroxy 0.9  5/10 6.33>11.5/32.8<239, 137/106/19/3/0.5<73, AST/ALT 42/56, Mg 1.7 (repleted), Phos 3.2, beta hydroxy-butyrate 1.3  5/11 7.29>11.8/33.4<239, 140/3.4/106/20/4/<0.5<106, AST/ALT 28/46, Ph 3.1, Mg 1.9, beta-hydroxy 0.7  5/12 7.82>11.6/32.6<215, 135/3.5/101/18/5/0.5<100, beta hydroxy 0.5, mag 1.9, phos 3.9  5/13 7.63>11.4/33.1<222, 134/3.5/102/18/5/0.5<97, Mg 1.9, Phos 4.1  5/14 7.98>11.3/33<217, 135/3.7/104/20/5/<0.5<105, AST/ALT 25/37, Mg 2, Ph 3.9, syphilis neg

## 2023-05-15 NOTE — PROGRESS NOTE ADULT - ASSESSMENT
28 yo  @11w3d with persistent hyperemesis gravidarum, for inpatient management.    - pt needs routine prenatal care as she has been in hospital prolonged time: she needs missing prenatal labs (ordered)  - arrange nuchal transucency to be done since there is no discharge plan yet, pt is still on PPN  - NIPT and carrier screening to be done today (5/15)  - ambulation, stretching, tylenol prn for knee pain  - vitals q4 hours  - SCDs, ambulation encouraged  - daily weights  - advance diet as tolerated  - s/p nutrition consult - started on PPN on 5/10  - s/p midline placement on   - s/p SW consult: pt cleared to go home with her sister and has adequate social support, will needs med labels in Slovak   - current meds: IV zofran 4mg q6h, IV protonix 40mg qd, IV b6 26mg BID, PO carafate 1g qd, rectal compazine 25mg BID, benadryl PRN, tylenol PRN  - ceftriaxone d/amanuel on 5/10 as Ucx resulted negative   - sara dick Dr. and OB attending to be aware 26 yo  @11w3d with persistent hyperemesis gravidarum, for inpatient management.    - pt needs routine prenatal care as she has been in hospital prolonged time: she needs missing prenatal labs (ordered)  - arrange nuchal transucency to be done since there is no discharge plan yet, pt is still on PPN  - NIPT and carrier screening to be done today (5/15)  - ambulation, stretching, tylenol prn for knee pain  - vitals q4 hours  - SCDs, ambulation encouraged  - daily weights  - advance diet as tolerated  - s/p nutrition consult - started on PPN on 5/10  - s/p midline placement on   - s/p SW consult: pt cleared to go home with her sister and has adequate social support, will needs med labels in Bengali   - current meds: IV zofran 4mg q6h, IV protonix 40mg qd, IV b6 26mg BID, PO carafate 1g qd, rectal compazine 25mg BID, benadryl PRN, tylenol PRN  - ceftriaxone d/amanuel on 5/10 as Ucx resulted negative   - sara dick Dr. and OB attending to be aware    ADDENDUM  When rounding with Dr. Cook, patient reports that her midline is causing her significant pain from the insertion site down to her wrist. Her arm is soft with no palpable fluid collection. Will contact the medical procedure team that placed the midline to assess the function of it.    Dr. Cook at bedside

## 2023-05-15 NOTE — PROGRESS NOTE ADULT - SUBJECTIVE AND OBJECTIVE BOX
NUTRITION SUPPORT TEAM  -  PROGRESS NOTE   resting comfortably  on po diet   and PPN  concern about pain around  the midline area  down to the forarm  site c/d/i  abdomen soft, gravid  REVIEW OF SYSTEMS:  Negative except as noted above.     VITALS:  T(F): 96.5 (05-15 @ 16:06), Max: 97.8 (05-15 @ 07:13)  HR: 93 (05-15 @ 16:06) (82 - 93)  BP: 95/56 (05-15 @ 16:06) (89/53 - 95/56)  RR: 18 (05-15 @ 16:06) (18 - 18)  SpO2: 95% (05-15 @ 16:06) (95% - 100%)    HEIGHT/WEIGHT/BMI:   Height (cm): 152.4 (05-08), 152.4 (05-04), 152.4 (04-24), 152.4 (04-23)  Weight (kg): 61.3 (04-24), 59 (04-23), 68 (03-16), 71 (03-03)  BMI (kg/m2): 26.4 (05-08), 26.4 (05-04), 26.4 (04-24), 25.4 (04-23)  MEDICATIONS:   acetaminophen     Tablet .. 650 milliGRAM(s) Oral every 6 hours PRN  diphenhydrAMINE Injectable 25 milliGRAM(s) IV Push every 8 hours PRN  fat emulsion (Fish Oil and Plant Based) 20% Infusion 1.632 Gm/kG/Day (31.3 mL/Hr) IV Continuous <Continuous>  fat emulsion (Fish Oil and Plant Based) 20% Infusion 1.632 Gm/kG/Day (31.3 mL/Hr) IV Continuous <Continuous>  ondansetron Injectable 4 milliGRAM(s) IV Push every 6 hours  pantoprazole    Tablet 40 milliGRAM(s) Oral before breakfast  Parenteral Nutrition - Adult 1 Each (70 mL/Hr) TPN Continuous <Continuous>  Parenteral Nutrition - Adult 1 Each (70 mL/Hr) TPN Continuous <Continuous>  prochlorperazine Suppository 25 milliGRAM(s) Rectal every 12 hours  pyridoxine 25 milliGRAM(s) Oral <User Schedule>  senna 2 Tablet(s) Oral at bedtime  simethicone 80 milliGRAM(s) Chew every 12 hours  sucralfate 1 Gram(s) Oral <User Schedule>    LABS:                         11.5   8.72  )-----------( 210      ( 15 May 2023 06:25 )             33.1     138  |  104  |  5<L>  ----------------------------<  103<H>          (05-15-23 @ 06:25)  3.5   |  21  |  <0.5<L>    Ca    9.2          (05-15-23 @ 06:25)  Phos  4.6         (05-15-23 @ 06:25)  Mg     2.0         (05-15-23 @ 06:25)    TPro  6.0  /  Alb  3.9  /  TBili  0.3  /  DBili  x   /  AST  25  /  ALT  37  /  AlkPhos  64       05-14-23 @ 08:19    Triglycerides, Serum: 88 mg/dL (04-26 @ 06:52)    Prealbumin, Serum:   Vitamin D, 25-Hydroxy:   Folate:   Vitamin B12, Serum:   Zinc Level, Plasma:   CRP:     Blood Glucose (Past 24 hours):    DIET:   Diet, Regular:   No Mixed Consistencies (05-09-23 @ 14:57) [Active]      fat emulsion (Fish Oil and Plant Based) 20% Infusion 1.632 Gm/kG/Day (31.3 mL/Hr) IV Continuous <Continuous>, 05-15-23 @ 20:00, 20:00, Stop order after: 16 Hours  fat emulsion (Fish Oil and Plant Based) 20% Infusion 1.632 Gm/kG/Day (31.3 mL/Hr) IV Continuous <Continuous>, 05-14-23 @ 20:00, 20:00, Stop order after: 16 Hours  Parenteral Nutrition - Adult 1 Each (70 mL/Hr) TPN Continuous <Continuous>, 05-15-23 @ 20:00, 20:00, Stop order after: 1 Days  Parenteral Nutrition - Adult 1 Each (70 mL/Hr) TPN Continuous <Continuous>, 05-14-23 @ 20:00, 20:00, Stop order after: 1 Days

## 2023-05-15 NOTE — PROGRESS NOTE ADULT - ASSESSMENT
{\rtf1\ieguug21699\ansi\hrpyikb5652\ftnbj\uc1\deff0  {\fonttbl{\f0 \fnil Segoe UI;}{\f1 \fnil \fcharset0 Segoe UI;}{\f2 \fnil Times New Dino;}}  {\colortbl ;\xcd474\yampd487\nouo871 ;\red0\green0\blue0 ;\red0\green0\kify592 ;\red0\green0\blue0 ;}  {\stylesheet{\f0\fs20 Normal;}{\cs1 Default Paragraph Font;}{\cs2\f0\fs16 Line Number;}{\cs3\f2\fs24\ul\cf3 Hyperlink;}}  {\*\revtbl{Unknown;}}  \attlji64811\bgjiye53675\goqax2407\ygdhw2066\udwtd3285\yvzuj5611\hftyhls438\vieizur842\nogrowautofit\wangaa627\formshade\nofeaturethrottle1\dntblnsbdb\fet4\aendnotes\aftnnrlc\pgbrdrhead\pgbrdrfoot  \sectd\smdbyg10042\yqjims38107\guttersxn0\pjgltouv8958\hllcwryb6669\vqffgfzm7345\drljwomj2545\ylqfdgw591\gfikapy364\sbkpage\pgncont\pgndec  \plain\plain\f0\fs24\ql\plain\f0\fs24\plain\f0\fs20\uoxa9302\hich\f0\dbch\f0\loch\f0\fs20 ASSESSMENT\par  28 yo  @10w4d with persistent hyperemesis gravidarum and complicated UTI, for inpatient management\par  - severe wt loss, ~14% over past ~2 months\par  - severe protein calorie malnutrition\par  - hypokalemia\par  \par  PLAN\par  -  PPN ordered  tonight\par  - check bmp/phos/mg\par  - pyridoxine  25mg PO q8hrs.\plain\f1\fs20\tuyo8876\hich\f1\dbch\f1\loch\f1\cf2\fs20\strike\plain\f1\fs20\xhlj6695\hich\f1\dbch\f1\loch\f1\cf2\fs20\plain\f0\fs20\tasl2043\hich\f0\dbch\f0\loch\f0\fs20\par  }

## 2023-05-16 LAB
ANION GAP SERPL CALC-SCNC: 12 MMOL/L — SIGNIFICANT CHANGE UP (ref 7–14)
BASOPHILS # BLD AUTO: 0.03 K/UL — SIGNIFICANT CHANGE UP (ref 0–0.2)
BASOPHILS NFR BLD AUTO: 0.4 % — SIGNIFICANT CHANGE UP (ref 0–1)
BUN SERPL-MCNC: 5 MG/DL — LOW (ref 10–20)
CALCIUM SERPL-MCNC: 9.4 MG/DL — SIGNIFICANT CHANGE UP (ref 8.4–10.4)
CHLORIDE SERPL-SCNC: 104 MMOL/L — SIGNIFICANT CHANGE UP (ref 98–110)
CO2 SERPL-SCNC: 21 MMOL/L — SIGNIFICANT CHANGE UP (ref 17–32)
CREAT SERPL-MCNC: <0.5 MG/DL — LOW (ref 0.7–1.5)
EGFR: 138 ML/MIN/1.73M2 — SIGNIFICANT CHANGE UP
EOSINOPHIL # BLD AUTO: 0.19 K/UL — SIGNIFICANT CHANGE UP (ref 0–0.7)
EOSINOPHIL NFR BLD AUTO: 2.4 % — SIGNIFICANT CHANGE UP (ref 0–8)
GLUCOSE SERPL-MCNC: 104 MG/DL — HIGH (ref 70–99)
HCT VFR BLD CALC: 32.5 % — LOW (ref 37–47)
HEMOGLOBIN INTERPRETATION: SIGNIFICANT CHANGE UP
HGB A MFR BLD: 97.4 % — SIGNIFICANT CHANGE UP (ref 95.8–98)
HGB A2 MFR BLD: 2.6 % — SIGNIFICANT CHANGE UP (ref 2–3.2)
HGB BLD-MCNC: 11.4 G/DL — LOW (ref 12–16)
IMM GRANULOCYTES NFR BLD AUTO: 0.3 % — SIGNIFICANT CHANGE UP (ref 0.1–0.3)
LYMPHOCYTES # BLD AUTO: 1.91 K/UL — SIGNIFICANT CHANGE UP (ref 1.2–3.4)
LYMPHOCYTES # BLD AUTO: 24 % — SIGNIFICANT CHANGE UP (ref 20.5–51.1)
MAGNESIUM SERPL-MCNC: 1.9 MG/DL — SIGNIFICANT CHANGE UP (ref 1.8–2.4)
MCHC RBC-ENTMCNC: 31.3 PG — HIGH (ref 27–31)
MCHC RBC-ENTMCNC: 35.1 G/DL — SIGNIFICANT CHANGE UP (ref 32–37)
MCV RBC AUTO: 89.3 FL — SIGNIFICANT CHANGE UP (ref 81–99)
MONOCYTES # BLD AUTO: 0.53 K/UL — SIGNIFICANT CHANGE UP (ref 0.1–0.6)
MONOCYTES NFR BLD AUTO: 6.6 % — SIGNIFICANT CHANGE UP (ref 1.7–9.3)
NEUTROPHILS # BLD AUTO: 5.29 K/UL — SIGNIFICANT CHANGE UP (ref 1.4–6.5)
NEUTROPHILS NFR BLD AUTO: 66.3 % — SIGNIFICANT CHANGE UP (ref 42.2–75.2)
NRBC # BLD: 0 /100 WBCS — SIGNIFICANT CHANGE UP (ref 0–0)
PHOSPHATE SERPL-MCNC: 4.3 MG/DL — SIGNIFICANT CHANGE UP (ref 2.1–4.9)
PLATELET # BLD AUTO: 214 K/UL — SIGNIFICANT CHANGE UP (ref 130–400)
PMV BLD: 12 FL — HIGH (ref 7.4–10.4)
POTASSIUM SERPL-MCNC: 3.6 MMOL/L — SIGNIFICANT CHANGE UP (ref 3.5–5)
POTASSIUM SERPL-SCNC: 3.6 MMOL/L — SIGNIFICANT CHANGE UP (ref 3.5–5)
RBC # BLD: 3.64 M/UL — LOW (ref 4.2–5.4)
RBC # FLD: 12.8 % — SIGNIFICANT CHANGE UP (ref 11.5–14.5)
RUBV IGG SER-ACNC: 4.2 INDEX — SIGNIFICANT CHANGE UP
RUBV IGG SER-IMP: POSITIVE — SIGNIFICANT CHANGE UP
SODIUM SERPL-SCNC: 137 MMOL/L — SIGNIFICANT CHANGE UP (ref 135–146)
WBC # BLD: 7.97 K/UL — SIGNIFICANT CHANGE UP (ref 4.8–10.8)
WBC # FLD AUTO: 7.97 K/UL — SIGNIFICANT CHANGE UP (ref 4.8–10.8)

## 2023-05-16 RX ORDER — ELECTROLYTE SOLUTION,INJ
1 VIAL (ML) INTRAVENOUS
Refills: 0 | Status: DISCONTINUED | OUTPATIENT
Start: 2023-05-16 | End: 2023-05-17

## 2023-05-16 RX ORDER — I.V. FAT EMULSION 20 G/100ML
1.63 EMULSION INTRAVENOUS
Qty: 100.04 | Refills: 0 | Status: DISCONTINUED | OUTPATIENT
Start: 2023-05-16 | End: 2023-05-17

## 2023-05-16 RX ORDER — ONDANSETRON 8 MG/1
4 TABLET, FILM COATED ORAL EVERY 6 HOURS
Refills: 0 | Status: DISCONTINUED | OUTPATIENT
Start: 2023-05-16 | End: 2023-05-17

## 2023-05-16 RX ADMIN — Medication 650 MILLIGRAM(S): at 12:44

## 2023-05-16 RX ADMIN — Medication 650 MILLIGRAM(S): at 05:26

## 2023-05-16 RX ADMIN — ONDANSETRON 4 MILLIGRAM(S): 8 TABLET, FILM COATED ORAL at 12:43

## 2023-05-16 RX ADMIN — I.V. FAT EMULSION 31.3 GM/KG/DAY: 20 EMULSION INTRAVENOUS at 21:33

## 2023-05-16 RX ADMIN — Medication 25 MILLIGRAM(S): at 05:26

## 2023-05-16 RX ADMIN — PANTOPRAZOLE SODIUM 40 MILLIGRAM(S): 20 TABLET, DELAYED RELEASE ORAL at 05:25

## 2023-05-16 RX ADMIN — ONDANSETRON 4 MILLIGRAM(S): 8 TABLET, FILM COATED ORAL at 05:25

## 2023-05-16 RX ADMIN — Medication 650 MILLIGRAM(S): at 18:43

## 2023-05-16 RX ADMIN — Medication 1 EACH: at 21:33

## 2023-05-16 RX ADMIN — Medication 25 MILLIGRAM(S): at 21:34

## 2023-05-16 RX ADMIN — Medication 1 GRAM(S): at 18:43

## 2023-05-16 RX ADMIN — SIMETHICONE 80 MILLIGRAM(S): 80 TABLET, CHEWABLE ORAL at 18:44

## 2023-05-16 RX ADMIN — Medication 25 MILLIGRAM(S): at 12:42

## 2023-05-16 RX ADMIN — ONDANSETRON 4 MILLIGRAM(S): 8 TABLET, FILM COATED ORAL at 23:04

## 2023-05-16 RX ADMIN — Medication 650 MILLIGRAM(S): at 06:47

## 2023-05-16 RX ADMIN — Medication 25 MILLIGRAM(S): at 18:44

## 2023-05-16 RX ADMIN — SIMETHICONE 80 MILLIGRAM(S): 80 TABLET, CHEWABLE ORAL at 05:25

## 2023-05-16 RX ADMIN — Medication 25 MILLIGRAM(S): at 05:25

## 2023-05-16 RX ADMIN — ONDANSETRON 4 MILLIGRAM(S): 8 TABLET, FILM COATED ORAL at 18:44

## 2023-05-16 NOTE — PROGRESS NOTE ADULT - SUBJECTIVE AND OBJECTIVE BOX
PGY1 Antepartum Note    #[id number]    Gestational Age: 11w4d  Hospital Day: #9    HPI: Patient seen and examined at bedside. She is resting comfortably, receiving PPN. Denies nausea. She was able to tolerate food all day yesterday, ate granola and rice. Had 0 episodes of emesis. Reports that she has not felt nausea since last night and that it resolved. Reports continued pain around her midline, PPN currently running without difficulty. S/p assessment from procedure team who reports it is in the right location. Patient reports PO tylenol helps. Denies HA, lightheadedness, palpitations,  fevers, chills, CP, SOB, LE edema, urinary symptoms. Denies abdominal pain, vaginal bleeding.     PAST MEDICAL & SURGICAL HISTORY:  No pertinent past medical history  S/P appendectomy    Vital Signs Last 24 Hrs  T(C): 36.4 (16 May 2023 00:09), Max: 36.6 (15 May 2023 07:13)  T(F): 97.6 (16 May 2023 00:09), Max: 97.8 (15 May 2023 07:13)  HR: 85 (16 May 2023 00:09) (85 - 93)  BP: 97/56 (16 May 2023 00:09) (89/53 - 97/56)  RR: 18 (16 May 2023 00:09) (18 - 18)  SpO2: 97% (16 May 2023 00:09) (95% - 100%)    Physical exam:  General - AAOx3, NAD  Abdomen - Gravid, soft, nontender  Extremities - No calf tenderness, no swelling    Labs:  4/30 7.06>12.5/35.3<216, 138/3.7/104/18/8/<0.5<77, AST/ALT 37/63, beta hydroxybutyrate 0.6  5/8 @2000 8.40>14/40.5<279, 137/3.6/102/18/9/0.5<85, AST/ALT 32/58, beta hydroxy buteryrate 2.2, Mg 1.9, lactate 1.3, UA LE pos, Large Ketones  UCx NG (final)   5/9 6.85>11.1/31.7<233, 138/3.4(repleted)/105/19/6/<0.5<78, AST/ALT 26/43, Mg 1.7 (repleted), Ph 3.8, beta hydroxy 0.9  5/10 6.33>11.5/32.8<239, 137/106/19/3/0.5<73, AST/ALT 42/56, Mg 1.7 (repleted), Phos 3.2, beta hydroxy-butyrate 1.3  5/11 7.29>11.8/33.4<239, 140/3.4/106/20/4/<0.5<106, AST/ALT 28/46, Ph 3.1, Mg 1.9, beta-hydroxy 0.7  5/12 7.82>11.6/32.6<215, 135/3.5/101/18/5/0.5<100, beta hydroxy 0.5, mag 1.9, phos 3.9  5/13 7.63>11.4/33.1<222, 134/3.5/102/18/5/0.5<97, Mg 1.9, Phos 4.1  5/14 7.98>11.3/33<217, 135/3.7/104/20/5/<0.5<105, AST/ALT 25/37, Mg 2, Ph 3.9, syphilis neg  5/15 8.72>11.5/33.1<210, 138/3.5/104/21/5/0.5<103, Mg 2.0, phos 4.6, HIV NR, RPR neg, VZV IgG neg, A1c 5%

## 2023-05-16 NOTE — PROGRESS NOTE ADULT - ASSESSMENT
26 yo  @11w4d with persistent hyperemesis gravidarum, for inpatient management.    - f/u rubella, hemoglobin electrophoresis, lead  - ambulation, stretching, tylenol prn for midline discomfort  - vitals q4 hours  - SCDs, ambulation encouraged  - daily weights  - advance diet as tolerated  - s/p nutrition consult - started on PPN on 5/10  - s/p midline placement on   - s/p SW consult: pt cleared to go home with her sister and has adequate social support, will needs med labels in [place]   - current meds: IV zofran 4mg q6h, protonix 40mg qd, b6 25mg q8h, qd, carafate 1g qd, compazine 25mg BID, benadryl PRN, tylenol PRN  - will transition to zofran to ODT  - ceftriaxone d/amanuel on 5/10 as Ucx resulted negative   - sara dick Dr. and OB attending to be aware

## 2023-05-16 NOTE — PROGRESS NOTE ADULT - SUBJECTIVE AND OBJECTIVE BOX
NUTRITION SUPPORT TEAM  -  PROGRESS NOTE   resting comfortably  on po diet   and PPN  concern about pain around  the midline area  down to the forarm  site c/d/i  abdomen soft, gravid  REVIEW OF SYSTEMS:  Negative except as noted above.     VITALS:  T(F): 97.8 (05-16 @ 08:03), Max: 97.8 (05-16 @ 08:03)  HR: 95 (05-16 @ 12:00) (85 - 95)  BP: 95/58 (05-16 @ 12:00) (89/55 - 95/58)  RR: 18 (05-16 @ 12:00) (18 - 18)  SpO2: 97% (05-16 @ 08:03) (97% - 97%)    HEIGHT/WEIGHT/BMI:   Height (cm): 152.4 (05-08), 152.4 (05-04), 152.4 (04-24), 152.4 (04-23)  Weight (kg): 61.3 (04-24), 59 (04-23), 68 (03-16), 71 (03-03)  BMI (kg/m2): 26.4 (05-08), 26.4 (05-04), 26.4 (04-24), 25.4 (04-23)    MEDICATIONS:   acetaminophen     Tablet .. 650 milliGRAM(s) Oral every 6 hours PRN  diphenhydrAMINE Injectable 25 milliGRAM(s) IV Push every 8 hours PRN  fat emulsion (Fish Oil and Plant Based) 20% Infusion 1.632 Gm/kG/Day (31.3 mL/Hr) IV Continuous <Continuous>  fat emulsion (Fish Oil and Plant Based) 20% Infusion 1.632 Gm/kG/Day (31.3 mL/Hr) IV Continuous <Continuous>  ondansetron   Disintegrating Tablet 4 milliGRAM(s) Oral every 6 hours  pantoprazole    Tablet 40 milliGRAM(s) Oral before breakfast  Parenteral Nutrition - Adult 1 Each (70 mL/Hr) TPN Continuous <Continuous>  Parenteral Nutrition - Adult 1 Each (70 mL/Hr) TPN Continuous <Continuous>  prochlorperazine Suppository 25 milliGRAM(s) Rectal every 12 hours  pyridoxine 25 milliGRAM(s) Oral <User Schedule>  senna 2 Tablet(s) Oral at bedtime  simethicone 80 milliGRAM(s) Chew every 12 hours  sucralfate 1 Gram(s) Oral <User Schedule>    LABS:                         11.4   7.97  )-----------( 214      ( 16 May 2023 06:59 )             32.5     137  |  104  |  5<L>  ----------------------------<  104<H>          (05-16-23 @ 06:59)  3.6   |  21  |  <0.5<L>    Ca    9.4          (05-16-23 @ 06:59)  Phos  4.3         (05-16-23 @ 06:59)  Mg     1.9         (05-16-23 @ 06:59)  Triglycerides, Serum: 88 mg/dL (04-26 @ 06:52)  DIET:   Diet, Regular:   No Mixed Consistencies (05-09-23 @ 14:57) [Active]      fat emulsion (Fish Oil and Plant Based) 20% Infusion 1.632 Gm/kG/Day (31.3 mL/Hr) IV Continuous <Continuous>, 05-16-23 @ 20:00, 20:00, Stop order after: 16 Hours  fat emulsion (Fish Oil and Plant Based) 20% Infusion 1.632 Gm/kG/Day (31.3 mL/Hr) IV Continuous <Continuous>, 05-15-23 @ 20:00, 20:00, Stop order after: 16 Hours  Parenteral Nutrition - Adult 1 Each (70 mL/Hr) TPN Continuous <Continuous>, 05-16-23 @ 20:00, 20:00, Stop order after: 1 Days  Parenteral Nutrition - Adult 1 Each (70 mL/Hr) TPN Continuous <Continuous>, 05-15-23 @ 20:00, 20:00, Stop order after: 1 Days

## 2023-05-16 NOTE — PROGRESS NOTE ADULT - ASSESSMENT
ASSESSMENT  26 yo  @10w4d with persistent hyperemesis gravidarum and complicated UTI, for inpatient management  - severe wt loss, ~14% over past ~2 months  - severe protein calorie malnutrition  - hypokalemia    PLAN  -  PPN ordered  tonight  - check bmp/phos/mg  - pyridoxine  25mg PO q8hrs.

## 2023-05-17 ENCOUNTER — TRANSCRIPTION ENCOUNTER (OUTPATIENT)
Age: 28
End: 2023-05-17

## 2023-05-17 VITALS
HEART RATE: 88 BPM | TEMPERATURE: 98 F | OXYGEN SATURATION: 97 % | SYSTOLIC BLOOD PRESSURE: 94 MMHG | RESPIRATION RATE: 18 BRPM | DIASTOLIC BLOOD PRESSURE: 51 MMHG

## 2023-05-17 LAB
ANION GAP SERPL CALC-SCNC: 12 MMOL/L — SIGNIFICANT CHANGE UP (ref 7–14)
BASOPHILS # BLD AUTO: 0.04 K/UL — SIGNIFICANT CHANGE UP (ref 0–0.2)
BASOPHILS NFR BLD AUTO: 0.5 % — SIGNIFICANT CHANGE UP (ref 0–1)
BUN SERPL-MCNC: 5 MG/DL — LOW (ref 10–20)
CALCIUM SERPL-MCNC: 9 MG/DL — SIGNIFICANT CHANGE UP (ref 8.4–10.5)
CHLORIDE SERPL-SCNC: 104 MMOL/L — SIGNIFICANT CHANGE UP (ref 98–110)
CO2 SERPL-SCNC: 21 MMOL/L — SIGNIFICANT CHANGE UP (ref 17–32)
CREAT SERPL-MCNC: <0.5 MG/DL — LOW (ref 0.7–1.5)
EGFR: 138 ML/MIN/1.73M2 — SIGNIFICANT CHANGE UP
EOSINOPHIL # BLD AUTO: 0.19 K/UL — SIGNIFICANT CHANGE UP (ref 0–0.7)
EOSINOPHIL NFR BLD AUTO: 2.5 % — SIGNIFICANT CHANGE UP (ref 0–8)
GLUCOSE SERPL-MCNC: 104 MG/DL — HIGH (ref 70–99)
HCT VFR BLD CALC: 33.1 % — LOW (ref 37–47)
HGB BLD-MCNC: 11.3 G/DL — LOW (ref 12–16)
IMM GRANULOCYTES NFR BLD AUTO: 0.3 % — SIGNIFICANT CHANGE UP (ref 0.1–0.3)
LYMPHOCYTES # BLD AUTO: 1.88 K/UL — SIGNIFICANT CHANGE UP (ref 1.2–3.4)
LYMPHOCYTES # BLD AUTO: 24.3 % — SIGNIFICANT CHANGE UP (ref 20.5–51.1)
MAGNESIUM SERPL-MCNC: 1.8 MG/DL — SIGNIFICANT CHANGE UP (ref 1.8–2.4)
MCHC RBC-ENTMCNC: 30.2 PG — SIGNIFICANT CHANGE UP (ref 27–31)
MCHC RBC-ENTMCNC: 34.1 G/DL — SIGNIFICANT CHANGE UP (ref 32–37)
MCV RBC AUTO: 88.5 FL — SIGNIFICANT CHANGE UP (ref 81–99)
MONOCYTES # BLD AUTO: 0.57 K/UL — SIGNIFICANT CHANGE UP (ref 0.1–0.6)
MONOCYTES NFR BLD AUTO: 7.4 % — SIGNIFICANT CHANGE UP (ref 1.7–9.3)
NEUTROPHILS # BLD AUTO: 5.04 K/UL — SIGNIFICANT CHANGE UP (ref 1.4–6.5)
NEUTROPHILS NFR BLD AUTO: 65 % — SIGNIFICANT CHANGE UP (ref 42.2–75.2)
NRBC # BLD: 0 /100 WBCS — SIGNIFICANT CHANGE UP (ref 0–0)
PHOSPHATE SERPL-MCNC: 3.9 MG/DL — SIGNIFICANT CHANGE UP (ref 2.1–4.9)
PLATELET # BLD AUTO: 218 K/UL — SIGNIFICANT CHANGE UP (ref 130–400)
PMV BLD: 12.4 FL — HIGH (ref 7.4–10.4)
POTASSIUM SERPL-MCNC: 3.5 MMOL/L — SIGNIFICANT CHANGE UP (ref 3.5–5)
POTASSIUM SERPL-SCNC: 3.5 MMOL/L — SIGNIFICANT CHANGE UP (ref 3.5–5)
RBC # BLD: 3.74 M/UL — LOW (ref 4.2–5.4)
RBC # FLD: 12.8 % — SIGNIFICANT CHANGE UP (ref 11.5–14.5)
SODIUM SERPL-SCNC: 137 MMOL/L — SIGNIFICANT CHANGE UP (ref 135–146)
WBC # BLD: 7.74 K/UL — SIGNIFICANT CHANGE UP (ref 4.8–10.8)
WBC # FLD AUTO: 7.74 K/UL — SIGNIFICANT CHANGE UP (ref 4.8–10.8)

## 2023-05-17 PROCEDURE — 99238 HOSP IP/OBS DSCHRG MGMT 30/<: CPT

## 2023-05-17 RX ORDER — SUCRALFATE 1 G
1 TABLET ORAL
Qty: 30 | Refills: 3
Start: 2023-05-17 | End: 2023-09-14

## 2023-05-17 RX ORDER — PROCHLORPERAZINE MALEATE 5 MG
1 TABLET ORAL
Qty: 60 | Refills: 3
Start: 2023-05-17 | End: 2023-09-13

## 2023-05-17 RX ORDER — PANTOPRAZOLE SODIUM 20 MG/1
1 TABLET, DELAYED RELEASE ORAL
Qty: 30 | Refills: 3
Start: 2023-05-17 | End: 2023-09-13

## 2023-05-17 RX ORDER — ONDANSETRON 8 MG/1
1 TABLET, FILM COATED ORAL
Qty: 120 | Refills: 3
Start: 2023-05-17 | End: 2023-09-13

## 2023-05-17 RX ORDER — SUCRALFATE 1 G
1 TABLET ORAL
Qty: 30 | Refills: 3
Start: 2023-05-17 | End: 2023-09-13

## 2023-05-17 RX ORDER — ONDANSETRON 8 MG/1
1 TABLET, FILM COATED ORAL
Qty: 120 | Refills: 3
Start: 2023-05-17 | End: 2023-09-14

## 2023-05-17 RX ORDER — PYRIDOXINE HCL (VITAMIN B6) 100 MG
1 TABLET ORAL
Qty: 90 | Refills: 3
Start: 2023-05-17 | End: 2023-09-14

## 2023-05-17 RX ORDER — PYRIDOXINE HCL (VITAMIN B6) 100 MG
1 TABLET ORAL
Qty: 90 | Refills: 3
Start: 2023-05-17 | End: 2023-09-13

## 2023-05-17 RX ORDER — PANTOPRAZOLE SODIUM 20 MG/1
1 TABLET, DELAYED RELEASE ORAL
Qty: 30 | Refills: 3
Start: 2023-05-17 | End: 2023-09-14

## 2023-05-17 RX ORDER — PROCHLORPERAZINE MALEATE 5 MG
1 TABLET ORAL
Qty: 60 | Refills: 3
Start: 2023-05-17 | End: 2023-09-14

## 2023-05-17 RX ADMIN — PANTOPRAZOLE SODIUM 40 MILLIGRAM(S): 20 TABLET, DELAYED RELEASE ORAL at 06:33

## 2023-05-17 RX ADMIN — ONDANSETRON 4 MILLIGRAM(S): 8 TABLET, FILM COATED ORAL at 19:17

## 2023-05-17 RX ADMIN — Medication 25 MILLIGRAM(S): at 06:34

## 2023-05-17 RX ADMIN — Medication 25 MILLIGRAM(S): at 19:19

## 2023-05-17 RX ADMIN — Medication 25 MILLIGRAM(S): at 16:30

## 2023-05-17 RX ADMIN — ONDANSETRON 4 MILLIGRAM(S): 8 TABLET, FILM COATED ORAL at 06:33

## 2023-05-17 RX ADMIN — SIMETHICONE 80 MILLIGRAM(S): 80 TABLET, CHEWABLE ORAL at 06:33

## 2023-05-17 RX ADMIN — ONDANSETRON 4 MILLIGRAM(S): 8 TABLET, FILM COATED ORAL at 12:53

## 2023-05-17 RX ADMIN — Medication 1 GRAM(S): at 19:17

## 2023-05-17 RX ADMIN — SIMETHICONE 80 MILLIGRAM(S): 80 TABLET, CHEWABLE ORAL at 19:17

## 2023-05-17 RX ADMIN — Medication 25 MILLIGRAM(S): at 06:33

## 2023-05-17 NOTE — PROGRESS NOTE ADULT - SUBJECTIVE AND OBJECTIVE BOX
PGY1 Antepartum Note    #[id number]    Gestational Age: 11w5d  Hospital Day: #10    HPI: Patient seen and examined at bedside. She is resting comfortably, receiving PPN. Denies nausea. She was able to tolerate food all day yesterday, ate granola, a banana, rice, and vegatables. Had 0 episodes of emesis. Reports that she did not feel nausea all day yesterday. Reports continued pain around her midline, PPN currently running without difficulty. S/p assessment from procedure team who reports it is in the right location. Patient reports PO tylenol helps. Denies HA, lightheadedness, palpitations,  fevers, chills, CP, SOB, LE edema, urinary symptoms. Denies abdominal pain, vaginal bleeding.     PAST MEDICAL & SURGICAL HISTORY:  No pertinent past medical history  S/P appendectomy    Vital Signs Last 24 Hrs  T(C): 36.1 (16 May 2023 23:32), Max: 36.6 (16 May 2023 08:03)  T(F): 97 (16 May 2023 23:32), Max: 97.8 (16 May 2023 08:03)  HR: 79 (16 May 2023 23:32) (79 - 95)  BP: 93/51 (16 May 2023 23:32) (89/55 - 97/54)  RR: 18 (16 May 2023 23:32) (18 - 18)  SpO2: 100% (16 May 2023 23:32) (97% - 100%)    Physical exam:  General - AAOx3, NAD  Abdomen - Gravid, soft, nontender  Extremities - No calf tenderness, no swelling    Labs:   7.06>12.5/35.3<216, 138/3.7/104/18/8/<0.5<77, AST/ALT 37/63, beta hydroxybutyrate 0.6   @2000 8.40>14/40.5<279, 137/3.6/102/18/9/0.5<85, AST/ALT 32/58, beta hydroxy buteryrate 2.2, Mg 1.9, lactate 1.3, UA LE pos, Large Ketones  UCx NG (final)    6.85>11.1/31.7<233, 138/3.4(repleted)/105/19/6/<0.5<78, AST/ALT 26/43, Mg 1.7 (repleted), Ph 3.8, beta hydroxy 0.9  5/10 6.33>11.5/32.8<239, 137/106/19/3/0.5<73, AST/ALT 42/56, Mg 1.7 (repleted), Phos 3.2, beta hydroxy-butyrate 1.3   7.29>11.8/33.4<239, 140/3.4/106/20/4/<0.5<106, AST/ALT 28/46, Ph 3.1, Mg 1.9, beta-hydroxy 0.7   7.82>11.6/32.6<215, 135/3.5/101/18/5/0.5<100, beta hydroxy 0.5, mag 1.9, phos 3.9   7.63>11.4/33.1<222, 134/3.5/102/18/5/0.5<97, Mg 1.9, Phos 4.1   7.98>11.3/33<217, 135/3.7/104/20/5/<0.5<105, AST/ALT 25/37, Mg 2, Ph 3.9, syphilis neg  5/15 8.72>11.5/33.1<210, 138/3.5/104/21/5/0.5<103, Mg 2.0, phos 4.6, HIV NR, RPR neg, VZV IgG neg, A1c 5%  : 7.97>11.4/32.5<214, 137/3.6/104/21/5/0.5<104, M.9, Phos: 4.3, hemoglobin electrophoresis normal, rubella immune

## 2023-05-17 NOTE — PROGRESS NOTE ADULT - ATTENDING COMMENTS
11 wks with hyperemesis gravidarum, requiring PPN  continue inpatient management, pt reports feeling better than yesterday  pt needs routine prenatal care: labs (HIV, RPR, hgb electro, etc) plus genetic screening (NIPT) and carrier screening, and nuchal translucency  pt vomited twice last night, but tolerated some food this mroning
28 y/o  @ 11w5d with hyperemesis gravidarum, tolerating diet now. Consider discontinuation of PPN and discharge with medications. Will follow up with nutrition and case management regarding possible home health aide for assistance with medication administration.
11+3 with hyperemesis gravidarum now with pain at site of midline iv. See resident's note above for details of plan. Will contact procedure team to see if midline needs to be replaced. Follow up ordered labs.   Pt spoken to with .

## 2023-05-17 NOTE — DISCHARGE NOTE ANTEPARTUM - CARE PROVIDER_API CALL
Natalie Cook)  Obstetrics and Gynecology  [address] [place] [id number]  Phone: [phone]  Fax: [phone]  Follow Up Time:

## 2023-05-17 NOTE — DISCHARGE NOTE ANTEPARTUM - MEDICATION SUMMARY - MEDICATIONS TO TAKE
I will START or STAY ON the medications listed below when I get home from the hospital:    prochlorperazine 25 mg rectal suppository  -- 1 suppository(ies) rectally every 12 hours  -- Indication: For Por nausea    ondansetron 4 mg oral tablet  -- 1 tab(s) by mouth every 6 hours  -- Indication: For Por nausea    sucralfate 1 g oral tablet  -- 1 tab(s) by mouth once a day  -- Indication: For Por nausea    pantoprazole 40 mg oral delayed release tablet  -- 1 tab(s) by mouth once a day (before a meal)  -- Indication: For Por nausea    pyridoxine 25 mg oral tablet  -- 1 tab(s) by mouth 3 times a day  -- Indication: For Por nausea

## 2023-05-17 NOTE — CHART NOTE - NSCHARTNOTEFT_GEN_A_CORE
PGY1 note    Language line [employer]   Came to patients bedside to discuss discharge/outpatient plans. She confirmed that she has an appointment tomorrow at 2:30pm, at the [facility]. She was instructed to bring her medications to her appointment. We discussed how to take the medications she was sent using the teach back method, patient understands that the instructions will also be on her discharge document in [place], she confirmed that she is able to read in [place].     Dr. Méndez and Dr. Brody aware

## 2023-05-17 NOTE — PROGRESS NOTE ADULT - ASSESSMENT
ASSESSMENT  26 yo  @10w4d with persistent hyperemesis gravidarum and complicated UTI, for inpatient management  - severe wt loss, ~14% over past ~2 months  - severe protein calorie malnutrition  - hypokalemia/hypomagnesemia    PLAN  spoke with OB team  -  d/c PPN today  - check bmp/phos/mg  - pyridoxine  25mg PO q8hrs.

## 2023-05-17 NOTE — DISCHARGE NOTE ANTEPARTUM - CARE PLAN
1 Principal Discharge DX:	Nausea & vomiting  Assessment and plan of treatment:	tome leisa medicamentos de la siguiente manera:  - zofran cada 6 horas  - protonix cada maddie  - B6 cada 8 horas  - carafate cada maddie  - compazine dos veces al maddie  Secondary Diagnosis:	Pregnancy  Assessment and plan of treatment:	Mohan un richardson en "[facility]" en dos semanas

## 2023-05-17 NOTE — PROGRESS NOTE ADULT - SUBJECTIVE AND OBJECTIVE BOX
NUTRITION SUPPORT TEAM  -  PROGRESS NOTE   resting comfortably  on po diet /eat much better this morning  and PPN  spoke with OB team   abdomen soft, gravid      REVIEW OF SYSTEMS:  Negative except as noted above.     VITALS:  T(F): 97.7 (05-17 @ 07:12), Max: 97.7 (05-17 @ 07:12)  HR: 787 (05-17 @ 07:12) (787 - 787)  BP: 93/55 (05-17 @ 07:12) (93/55 - 93/55)  RR: 18 (05-17 @ 07:12) (18 - 18)  SpO2: 100% (05-17 @ 07:12) (100% - 100%)    HEIGHT/WEIGHT/BMI:   Height (cm): 152.4 (05-08), 152.4 (05-04), 152.4 (04-24), 152.4 (04-23)  Weight (kg): 61.3 (04-24), 59 (04-23), 68 (03-16), 71 (03-03)  BMI (kg/m2): 26.4 (05-08), 26.4 (05-04), 26.4 (04-24), 25.4 (04-23)  05-16-23 @ 07:01  -  05-17-23 @ 07:00  --------------------------------------------------------  IN:    Oral Fluid: 720 mL  Total IN: 720 mL    OUT:  Total OUT: 0 mL    Total NET: 720 mL        MEDICATIONS:   acetaminophen     Tablet .. 650 milliGRAM(s) Oral every 6 hours PRN  diphenhydrAMINE Injectable 25 milliGRAM(s) IV Push every 8 hours PRN  ondansetron   Disintegrating Tablet 4 milliGRAM(s) Oral every 6 hours  pantoprazole    Tablet 40 milliGRAM(s) Oral before breakfast  prochlorperazine Suppository 25 milliGRAM(s) Rectal every 12 hours  pyridoxine 25 milliGRAM(s) Oral <User Schedule>  senna 2 Tablet(s) Oral at bedtime  simethicone 80 milliGRAM(s) Chew every 12 hours  sucralfate 1 Gram(s) Oral <User Schedule>    LABS:                         11.3   7.74  )-----------( 218      ( 17 May 2023 08:15 )             33.1     137  |  104  |  5<L>  ----------------------------<  104<H>          (05-17-23 @ 08:15)  3.5   |  21  |  <0.5<L>    Ca    9.0          (05-17-23 @ 08:15)  Phos  3.9         (05-17-23 @ 08:15)  Mg     1.8         (05-17-23 @ 08:15)  Triglycerides, Serum: 88 mg/dL (04-26 @ 06:52)  DIET:   Diet, Regular:   No Mixed Consistencies (05-09-23 @ 14:57) [Active]     NUTRITION SUPPORT TEAM  -  PROGRESS NOTE   resting comfortably  on po diet /eat much better this morning  and PPN, cont to c/o pain caudal to midline site - no erythema, swelling or tenderness  spoke with OB team   abdomen soft, gravid    REVIEW OF SYSTEMS:  Negative except as noted above.     VITALS:  T(F): 97.7 (05-17 @ 07:12), Max: 97.7 (05-17 @ 07:12)  HR: 787 (05-17 @ 07:12) (787 - 787)  BP: 93/55 (05-17 @ 07:12) (93/55 - 93/55)  RR: 18 (05-17 @ 07:12) (18 - 18)  SpO2: 100% (05-17 @ 07:12) (100% - 100%)    HEIGHT/WEIGHT/BMI:   Height (cm): 152.4 (05-08), 152.4 (05-04), 152.4 (04-24), 152.4 (04-23)  Weight (kg): 61.3 (04-24), 59 (04-23), 68 (03-16), 71 (03-03)  BMI (kg/m2): 26.4 (05-08), 26.4 (05-04), 26.4 (04-24), 25.4 (04-23)  05-16-23 @ 07:01  -  05-17-23 @ 07:00  --------------------------------------------------------  IN:    Oral Fluid: 720 mL--------  once again PPN and lipids not documented in I&O, but ARE infusing  Total IN: 720 mL    OUT:  Total OUT: 0 mL    Total NET: 720 mL      MEDICATIONS:   acetaminophen     Tablet .. 650 milliGRAM(s) Oral every 6 hours PRN  diphenhydrAMINE Injectable 25 milliGRAM(s) IV Push every 8 hours PRN  ondansetron   Disintegrating Tablet 4 milliGRAM(s) Oral every 6 hours  pantoprazole    Tablet 40 milliGRAM(s) Oral before breakfast  prochlorperazine Suppository 25 milliGRAM(s) Rectal every 12 hours  pyridoxine 25 milliGRAM(s) Oral <User Schedule>  senna 2 Tablet(s) Oral at bedtime  simethicone 80 milliGRAM(s) Chew every 12 hours  sucralfate 1 Gram(s) Oral <User Schedule>    LABS:                         11.3   7.74  )-----------( 218      ( 17 May 2023 08:15 )             33.1     137  |  104  |  5<L>  ----------------------------<  104<H>          (05-17-23 @ 08:15)  3.5   |  21  |  <0.5<L>    Ca    9.0          (05-17-23 @ 08:15)  Phos  3.9         (05-17-23 @ 08:15)  Mg     1.8         (05-17-23 @ 08:15)  Triglycerides, Serum: 88 mg/dL (04-26 @ 06:52)  DIET:   Diet, Regular:   No Mixed Consistencies (05-09-23 @ 14:57) [Active]

## 2023-05-17 NOTE — DISCHARGE NOTE ANTEPARTUM - HOSPITAL COURSE
28 yo  @10w3d by LMP (23) and first trim sono, presents to the ED with nausea and vomiting. Patient has had multiple ED visits and hospital admissions for similar complaints. She was admitted to [facility] from - and - for inpatient management of hyperemesis gravidarum. She was discharged home with compazine, pepcid, b6, zofran, protonix, sucralate, and miralax. Patient reports she has been taking medications as prescribed without improvement in nausea, and continues to vomit with any PO intake. Her last PO intake  was 2 days ago.  States she did not have any improvement of symptoms after discharge.  Also c/o R sided abdominal pain with radiation to her right flank. Patient has known UTI, but was unable to tolerate antibiotics.  Also complaining of constipation for 3 days.  Patient has had multiple hospital admissions and ED visits for nausea and vomitting in pregnancy.  During her first admission to [facility] () she was found to have cholelithiasis and mildly elevated LFTs. S/P Lap appendectomy early April.  Denies fevers, chills, diarrhea. Denies urinary symptoms. Denies discharge, vaginal bleeding.     Hyperemesis controlled with medications, PPN started and discontinued.

## 2023-05-17 NOTE — DISCHARGE NOTE ANTEPARTUM - PLAN OF CARE
tome leisa medicamentos de la siguiente manera:  - zofran cada 6 horas  - protonix cada maddie  - B6 cada 8 horas  - carafate cada maddie  - compazine dos veces al maddie Mohan un richardson en "[facility]" en dos semanas

## 2023-05-17 NOTE — DISCHARGE NOTE ANTEPARTUM - INSTRUCTIONS
Consuma esmer dieta panda balanceada que incluya proteínas (kady magras, britney, pescado y frijoles), fruta fresca o jugo, verduras frescas y productos lácteos.  No liam dieta ni se muera de hambre para volver a la forma que tenía antes del embarazo.  Amarilis de 8 a 10 vasos de líquido cada día.

## 2023-05-17 NOTE — DISCHARGE NOTE NURSING/CASE MANAGEMENT/SOCIAL WORK - NSDCPEFALRISK_GEN_ALL_CORE
For information on Fall & Injury Prevention, visit: [email or web address] OR  [email or web address] OR  https://www.cdc.gov/steadi/patient.html

## 2023-05-17 NOTE — PROGRESS NOTE ADULT - REASON FOR ADMISSION
Hyperemesis Gravidarum

## 2023-05-17 NOTE — DISCHARGE NOTE ANTEPARTUM - MEDICATION SUMMARY - MEDICATIONS TO STOP TAKING
I will STOP taking the medications listed below when I get home from the hospital:    cefpodoxime 200 mg oral tablet  -- 1 tab(s) by mouth 2 times a day

## 2023-05-17 NOTE — DISCHARGE NOTE ANTEPARTUM - PATIENT PORTAL LINK FT
You can access the FollowMyHealth Patient Portal offered by [facility] by registering at the following website: [email or web address]. By joining [employer]’s FollowMyHealth portal, you will also be able to view your health information using other applications (apps) compatible with our system.

## 2023-05-17 NOTE — PROGRESS NOTE ADULT - ASSESSMENT
28 yo  @11w5d with persistent hyperemesis gravidarum, for inpatient management.    - f/u lead  - ambulation, stretching, tylenol prn for midline discomfort  - vitals q4 hours  - SCDs, ambulation encouraged  - daily weights  - advance diet as tolerated  - s/p nutrition consult - started on PPN on 5/10  - s/p midline placement on   - s/p SW consult: pt cleared to go home with her sister and has adequate social support, will needs med labels in [place]   - current meds: zofran 4mg q6h, protonix 40mg qd, b6 25mg q8h, qd, carafate 1g qd, compazine 25mg BID, benadryl PRN, tylenol PRN  - ceftriaxone d/amanuel on 5/10 as Ucx resulted negative   - sara dick Dr. and OB attending to be aware

## 2023-05-17 NOTE — DISCHARGE NOTE ANTEPARTUM - ADDITIONAL INSTRUCTIONS
Llame al médico o regrese al hospital por:  sangrado vaginal, dolor abdominal intenso, ruptura de membranas, temperatura superior a 100.4 grados, vómitos persistentes, contracciones uterinas regulares, área hinchada en la pierna que duele, renzo o caliente, dolor en las pantorrillas

## 2023-05-17 NOTE — DISCHARGE NOTE NURSING/CASE MANAGEMENT/SOCIAL WORK - PATIENT PORTAL LINK FT
You can access the FollowMyHealth Patient Portal offered by [facility] by registering at the following website: [email or web address]. By joining [employer]’s FollowMyHealth portal, you will also be able to view your health information using other applications (apps) compatible with our system.

## 2023-05-18 ENCOUNTER — APPOINTMENT (OUTPATIENT)
Dept: OBGYN | Facility: CLINIC | Age: 28
End: 2023-05-18
Payer: MEDICAID

## 2023-05-18 ENCOUNTER — APPOINTMENT (OUTPATIENT)
Dept: OBGYN | Facility: CLINIC | Age: 28
End: 2023-05-18

## 2023-05-18 ENCOUNTER — OUTPATIENT (OUTPATIENT)
Dept: OUTPATIENT SERVICES | Facility: HOSPITAL | Age: 28
LOS: 1 days | End: 2023-05-18
Payer: MEDICAID

## 2023-05-18 VITALS
WEIGHT: 132.5 LBS | SYSTOLIC BLOOD PRESSURE: 100 MMHG | HEIGHT: 61 IN | BODY MASS INDEX: 25.02 KG/M2 | DIASTOLIC BLOOD PRESSURE: 72 MMHG

## 2023-05-18 DIAGNOSIS — Z34.90 ENCOUNTER FOR SUPERVISION OF NORMAL PREGNANCY, UNSPECIFIED, UNSPECIFIED TRIMESTER: ICD-10-CM

## 2023-05-18 DIAGNOSIS — Z90.49 ACQUIRED ABSENCE OF OTHER SPECIFIED PARTS OF DIGESTIVE TRACT: Chronic | ICD-10-CM

## 2023-05-18 PROCEDURE — 87591 N.GONORRHOEAE DNA AMP PROB: CPT

## 2023-05-18 PROCEDURE — G0452: CPT | Mod: 26

## 2023-05-18 PROCEDURE — 81204 AR GENE CHARAC ALLELES: CPT

## 2023-05-18 PROCEDURE — 87661 TRICHOMONAS VAGINALIS AMPLIF: CPT

## 2023-05-18 PROCEDURE — 81420 FETAL CHRMOML ANEUPLOIDY: CPT

## 2023-05-18 PROCEDURE — 83020 HEMOGLOBIN ELECTROPHORESIS: CPT | Mod: 26

## 2023-05-18 PROCEDURE — 81220 CFTR GENE COM VARIANTS: CPT

## 2023-05-18 PROCEDURE — 88142 CYTOPATH C/V THIN LAYER: CPT

## 2023-05-18 PROCEDURE — T1013: CPT

## 2023-05-18 PROCEDURE — 99214 OFFICE O/P EST MOD 30 MIN: CPT

## 2023-05-18 PROCEDURE — 81243 FMR1 GEN ALY DETC ABNL ALLEL: CPT

## 2023-05-18 PROCEDURE — 87491 CHLMYD TRACH DNA AMP PROBE: CPT

## 2023-05-18 PROCEDURE — 83020 HEMOGLOBIN ELECTROPHORESIS: CPT

## 2023-05-18 RX ORDER — ASPIRIN 81 MG/1
81 TABLET, CHEWABLE ORAL DAILY
Qty: 30 | Refills: 10 | Status: ACTIVE | COMMUNITY
Start: 2023-05-18

## 2023-05-18 RX ORDER — ACETAMINOPHEN 325 MG/1
325 TABLET, FILM COATED ORAL EVERY 6 HOURS
Qty: 30 | Refills: 2 | Status: ACTIVE | COMMUNITY
Start: 2023-05-18 | End: 1900-01-01

## 2023-05-19 DIAGNOSIS — Z34.90 ENCOUNTER FOR SUPERVISION OF NORMAL PREGNANCY, UNSPECIFIED, UNSPECIFIED TRIMESTER: ICD-10-CM

## 2023-05-19 LAB — LEAD BLD-MCNC: 5.4 UG/DL — HIGH (ref 0–3.4)

## 2023-05-22 ENCOUNTER — NON-APPOINTMENT (OUTPATIENT)
Age: 28
End: 2023-05-22

## 2023-05-23 LAB
C TRACH RRNA SPEC QL NAA+PROBE: NOT DETECTED
CYTOLOGY CVX/VAG DOC THIN PREP: NORMAL
HGB A MFR BLD: 97.4 %
HGB A2 MFR BLD: 2.6 %
HGB FRACT BLD-IMP: NORMAL
N GONORRHOEA RRNA SPEC QL NAA+PROBE: NOT DETECTED
SOURCE AMPLIFICATION: NORMAL
SOURCE AMPLIFICATION: NORMAL
T VAGINALIS RRNA SPEC QL NAA+PROBE: NOT DETECTED

## 2023-05-27 ENCOUNTER — NON-APPOINTMENT (OUTPATIENT)
Age: 28
End: 2023-05-27

## 2023-05-27 LAB
AR GENE MUT ANL BLD/T: NORMAL
CFTR MUT TESTED BLD/T: NEGATIVE
CHROMOSOME13 INTERPRETATION: NORMAL
CHROMOSOME13 TEST RESULT: NORMAL
CHROMOSOME18 INTERPRETATION: NORMAL
CHROMOSOME18 TEST RESULT: NORMAL
CHROMOSOME21 INTERPRETATION: NORMAL
CHROMOSOME21 TEST RESULT: NORMAL
FETAL FRACTION: NORMAL
FMR1 GENE MUT ANL BLD/T: NORMAL
PERFORMANCE AND LIMITATIONS: NORMAL
SEX CHROMOSOME INTERPRETATION: NORMAL
SEX CHROMOSOME TEST RESULT: NORMAL
VERIFI PRENATAL TEST: NOT DETECTED

## 2023-05-27 RX ORDER — PROCHLORPERAZINE 25 MG/1
25 SUPPOSITORY RECTAL TWICE DAILY
Qty: 20 | Refills: 0 | Status: ACTIVE | COMMUNITY
Start: 2023-05-27 | End: 1900-01-01

## 2023-05-29 DIAGNOSIS — O25.11 MALNUTRITION IN PREGNANCY, FIRST TRIMESTER: ICD-10-CM

## 2023-05-29 DIAGNOSIS — O23.41 UNSPECIFIED INFECTION OF URINARY TRACT IN PREGNANCY, FIRST TRIMESTER: ICD-10-CM

## 2023-05-29 DIAGNOSIS — Z28.09 IMMUNIZATION NOT CARRIED OUT BECAUSE OF OTHER CONTRAINDICATION: ICD-10-CM

## 2023-05-29 DIAGNOSIS — Z90.49 ACQUIRED ABSENCE OF OTHER SPECIFIED PARTS OF DIGESTIVE TRACT: ICD-10-CM

## 2023-05-29 DIAGNOSIS — E43 UNSPECIFIED SEVERE PROTEIN-CALORIE MALNUTRITION: ICD-10-CM

## 2023-05-29 DIAGNOSIS — Z79.899 OTHER LONG TERM (CURRENT) DRUG THERAPY: ICD-10-CM

## 2023-05-29 DIAGNOSIS — N83.209 UNSPECIFIED OVARIAN CYST, UNSPECIFIED SIDE: ICD-10-CM

## 2023-05-29 DIAGNOSIS — O34.81 MATERNAL CARE FOR OTHER ABNORMALITIES OF PELVIC ORGANS, FIRST TRIMESTER: ICD-10-CM

## 2023-05-29 DIAGNOSIS — O21.1 HYPEREMESIS GRAVIDARUM WITH METABOLIC DISTURBANCE: ICD-10-CM

## 2023-05-29 DIAGNOSIS — E83.42 HYPOMAGNESEMIA: ICD-10-CM

## 2023-05-29 DIAGNOSIS — Z3A.10 10 WEEKS GESTATION OF PREGNANCY: ICD-10-CM

## 2023-05-29 DIAGNOSIS — K80.20 CALCULUS OF GALLBLADDER WITHOUT CHOLECYSTITIS WITHOUT OBSTRUCTION: ICD-10-CM

## 2023-05-29 DIAGNOSIS — O99.611 DISEASES OF THE DIGESTIVE SYSTEM COMPLICATING PREGNANCY, FIRST TRIMESTER: ICD-10-CM

## 2023-05-29 DIAGNOSIS — O99.281 ENDOCRINE, NUTRITIONAL AND METABOLIC DISEASES COMPLICATING PREGNANCY, FIRST TRIMESTER: ICD-10-CM

## 2023-05-29 DIAGNOSIS — K59.00 CONSTIPATION, UNSPECIFIED: ICD-10-CM

## 2023-05-31 ENCOUNTER — EMERGENCY (EMERGENCY)
Facility: HOSPITAL | Age: 28
LOS: 0 days | Discharge: HOME | End: 2023-05-31
Attending: EMERGENCY MEDICINE
Payer: MEDICAID

## 2023-05-31 VITALS
DIASTOLIC BLOOD PRESSURE: 66 MMHG | OXYGEN SATURATION: 99 % | HEART RATE: 88 BPM | TEMPERATURE: 97 F | RESPIRATION RATE: 18 BRPM | SYSTOLIC BLOOD PRESSURE: 112 MMHG

## 2023-05-31 VITALS
OXYGEN SATURATION: 98 % | HEIGHT: 60 IN | HEART RATE: 101 BPM | WEIGHT: 136.91 LBS | DIASTOLIC BLOOD PRESSURE: 68 MMHG | SYSTOLIC BLOOD PRESSURE: 109 MMHG | RESPIRATION RATE: 18 BRPM | TEMPERATURE: 98 F

## 2023-05-31 DIAGNOSIS — J06.9 ACUTE UPPER RESPIRATORY INFECTION, UNSPECIFIED: ICD-10-CM

## 2023-05-31 DIAGNOSIS — O99.519 DISEASES OF THE RESPIRATORY SYSTEM COMPLICATING PREGNANCY, UNSPECIFIED TRIMESTER: ICD-10-CM

## 2023-05-31 DIAGNOSIS — H57.89 OTHER SPECIFIED DISORDERS OF EYE AND ADNEXA: ICD-10-CM

## 2023-05-31 DIAGNOSIS — J30.2 OTHER SEASONAL ALLERGIC RHINITIS: ICD-10-CM

## 2023-05-31 DIAGNOSIS — Z90.49 ACQUIRED ABSENCE OF OTHER SPECIFIED PARTS OF DIGESTIVE TRACT: Chronic | ICD-10-CM

## 2023-05-31 DIAGNOSIS — Z3A.00 WEEKS OF GESTATION OF PREGNANCY NOT SPECIFIED: ICD-10-CM

## 2023-05-31 DIAGNOSIS — O99.891 OTHER SPECIFIED DISEASES AND CONDITIONS COMPLICATING PREGNANCY: ICD-10-CM

## 2023-05-31 DIAGNOSIS — J02.9 ACUTE PHARYNGITIS, UNSPECIFIED: ICD-10-CM

## 2023-05-31 DIAGNOSIS — Z90.49 ACQUIRED ABSENCE OF OTHER SPECIFIED PARTS OF DIGESTIVE TRACT: ICD-10-CM

## 2023-05-31 PROCEDURE — 99284 EMERGENCY DEPT VISIT MOD MDM: CPT

## 2023-05-31 PROCEDURE — 99283 EMERGENCY DEPT VISIT LOW MDM: CPT

## 2023-05-31 RX ORDER — LORATADINE 10 MG/1
1 TABLET ORAL
Qty: 7 | Refills: 0
Start: 2023-05-31 | End: 2023-06-06

## 2023-05-31 RX ORDER — FLUTICASONE PROPIONATE 50 MCG
2 SPRAY, SUSPENSION NASAL
Qty: 1 | Refills: 0
Start: 2023-05-31 | End: 2023-06-06

## 2023-05-31 NOTE — ED PROVIDER NOTE - PATIENT PORTAL LINK FT
You can access the FollowMyHealth Patient Portal offered by Health system by registering at the following website: [email or web address]. By joining [employer]’s FollowMyHealth portal, you will also be able to view your health information using other applications (apps) compatible with our system.

## 2023-05-31 NOTE — ED PROVIDER NOTE - NSFOLLOWUPINSTRUCTIONS_ED_ALL_ED_FT
Call primary care provider for follow up after discharge/Activities as tolerated/Low salt diet/Monitor weight daily/Report signs and symptoms to primary care provider Nuestros coordinadores de referencias del departamento de emergencias se comunicarán con usted en las próximas 24 a  48 horas de 9:00 a. m. a 5:00 p. m. (de lunes a viernes) con esmer richardson de seguimiento. Espere esmer llamada telefónica del hospital en alma período de tiempo. Si no recibe esmer llamada o si tiene alguna pregunta o inquietud, puede comunicarse con marjorieos al [phone].    ----------------    Rinitis alérgica en adultos  Allergic Rhinitis, Adult    La rinitis alérgica es esmer reacción alérgica que afecta la membrana mucosa que se encuentra en la nariz. La membrana mucosa es el tejido que produce mucosidad.    Existen dos tipos de rinitis alérgica:  Estacional. A puja tipo también se le llama “fiebre del heno” y ocurre solo hortencia ciertas estaciones.  Perenne. Puja tipo puede ocurrir en cualquier momento del año.  La rinitis alérgica no puede transmitirse de esmer persona a otra. Esta afección puede ser leve, moderada o grave. Puede aparecer a cualquier edad y se puede superar con los años.    ¿Cuáles son las causas?  Esta afección es causada por alérgenos. Estas son cosas que pueden causar esmer reacción alérgica. Los alérgenos de la rinitis alérgica estacional y de la rinitis alérgica perenne pueden ser diferentes.  La rinitis alérgica estacional es desencadenada por el polen. El polen puede provenir de los árboles, el pasto y las malezas.  La rinitis alérgica perenne puede ser desencadenada por:  Ácaros del polvo.  Proteínas en la orina, la saliva o la caspa de esmer mascota. La caspa son las células muertas de la piel de esmer mascota.  Humo en general, moho o humo de automóviles.  ¿Qué incrementa el riesgo?  Esmer persona tiene más probabilidades de presentar esta afección si tiene antecedentes familiares de alergias u otras afecciones relacionadas con las alergias, entre las que se incluyen:  Conjuntivitis alérgica.Es la inflamación de partes de los ojos y los párpados.  Asma. Esta afección afecta los pulmones y dificulta la respiración.  Dermatitis atópica o eczema. Es esmer inflamación de la piel a lucille plazo (crónica).  Alergias a los alimentos  ¿Cuáles son los signos o síntomas?  Los síntomas de esta afección incluyen:  Tos o estornudos.  Nariz tapada (congestión nasal), picazón en la nariz o secreción nasal.  Ojos y lagrimeo en los ojos.  Sensación de mucosidad que gotea por la parte posterior de la garganta (goteo posnasal).  Dificultad para dormir.  Cansancio o fatiga.  Dolor de david.  Dolor de garganta.  ¿Cómo se diagnostica?  Esta afección se puede diagnosticar por leisa síntomas, leisa antecedentes médicos y un examen físico. El médico puede controlar si tiene alguna afección relacionada, por ejemplo:  Asma.  Armin hartman. Esta es esmer inflamación de los ojos causada por esmer infección (conjuntivitis).  Infección en los oídos.  Infección de las vías respiratorias superiores. Esta es esmer infección de la nariz, la garganta o las vías respiratorias superiores.  También pueden hacerle estudios para averiguar qué alérgenos desencadenan leisa síntomas. Por ejemplo, análisis cutáneos y de jeremias.    ¿Cómo se trata?  No hay radha para esta afección, lee el tratamiento puede ayudar a controlar los síntomas. El tratamiento puede incluir:  Rell medicamentos que inhiben los síntomas de la alergia, fabio los corticoesteroides y los antihistamínicos. Medicamentos que pueden administrarse por inyección, aerosol nasal o píldoras.  Evitar los alérgenos que corresponda.  La exposición repetida a pequeñas cantidades de alérgenos para ayudarlo a generar defensas contra los alérgenos (inmunoterapia). [place] se realiza si otros tratamientos no boucher sido exitosos. Puede incluir lo siguiente:  Vacunas contra la alergia. Se trata de medicamentos inyectables que contienen pequeñas cantidades de alérgenos.  Inmunoterapia sublingual. Esta implica rell pequeñas dosis de un medicamento con alérgenos debajo de la lengua.  Si estos tratamientos no funcionan, el médico puede recetarle medicamentos más nuevos y más phuong.    Siga estas instrucciones en [place] casa:  Evite los alérgenos    [place] a qué es alérgico y evite esos alérgenos. Hay medidas que puede rell para ayudar a evitar los alérgenos:  Si tiene alergias perennes:  Reemplace las alfombras por pisos de chester, baldosas o vinilo. Las alfombras pueden retener la caspa o los pelos de los animales y el polvo.  No fume. No permita que fumen en [place]m casa.  Cambie los filtros de la calefacción y del aire acondicionado al menos esmer vez al mes.  Si tiene alergias estacionales, siga estos pasos hortencia la temporada de alergias:  Mantenga las ventanas cerradas la mayor cantidad de tiempo posible.  Planee actividades al aire kiesha cuando las concentraciones de polen estén en [place] nivel más bajo. Fíjese en las concentraciones de polen antes de planificar actividades al aire kiesha.  Cuando ingrese al interior, cámbiese de ropa y dese esmer ducha antes de sentarse en los muebles o la cama.  Si tiene esmer mascota en la casa que produce alérgenos:  Mantenga a la mascota fuera del dormitorio.  Pase la aspiradora, hemanth y limpie el polvo con regularidad.  Instrucciones generales    Use los medicamentos de venta kiesha y los recetados solamente fabio se lo haya indicado el médico.  Amarilis suficiente líquido fabio para mantener la orina de color amarillo pálido.  Concurra a todas las visitas de seguimiento fabio se lo haya indicado el médico. [place] es importante.  Dónde buscar más información  American Academy of Allergy, Asthma & Immunology (Academia Estadounidense de Alergia, Asma e Inmunología): www.aaaai.org  Comuníquese con un médico si:  Tiene fiebre.  Tiene tos que no desaparece.  Comienza a emitir un jv benson al respirar (sibilancias).  Los síntomas lo enlentecen o impiden que liam leisa actividades normales todos los días.  Solicite ayuda de inmediato si:  Le falta el aire.  Puja síntoma pueden representar un problema grave que constituye esmer emergencia. No espere a dominic si el síntoma desaparece. Solicite atención médica de inmediato. Comuníquese con el servicio de emergencias de chisholm localidad (911 en los [place]). No conduzca por leisa propios medios hasta el hospital.    Resumen  La rinitis alérgica puede manejarse tomando medicamentos según las indicaciones y evitando los alérgenos.  Si tiene alergias estacionales, mantenga las ventanas cerradas la mayor cantidad de tiempo posible hortencia la estación de alergias.    Comuníquese con el médico si le aparece esmer fiebre o esmer tos que no desaparece.  Esta información no tiene fabio fin reemplazar el consejo del médico. Asegúrese de hacerle al médico cualquier pregunta que tenga.

## 2023-05-31 NOTE — ED PROVIDER NOTE - CARE PLAN
1 Principal Discharge DX:	Viral upper respiratory illness  Secondary Diagnosis:	Seasonal allergies

## 2023-05-31 NOTE — ED PROVIDER NOTE - CLINICAL SUMMARY MEDICAL DECISION MAKING FREE TEXT BOX
Patient presented with runny nose, eyes watering x 2 weeks. Otherwise HD stable, well appearing, no acute respiratory distress on RA. Lungs clear. No meningeal signs or petechiae/rash, no concern for strep pharyngitis based on centor criteria, neuro intact, TMs clear, abdomen non-tender. Patient remained stable on RA, and able to ambulate without difficulty or desaturation. Likely viral vs allergy etiology. Given the above, will discharge home with nasal spray, decongestant, outpatient follow up. Patient agreeable with plan. Agrees to return to ED immediately for any new or worsening symptoms.

## 2023-05-31 NOTE — ED PROVIDER NOTE - OBJECTIVE STATEMENT
Patient is a 27-year-old female presenting with sore throat X 2 days with associated 1-2 weeks of runny nose and eye watering/itching. Patient is currently 3 months pregnant. She has no medical history, no fever/chills/malaise, no cough or shortness of breath, no pelvic cramps or vaginal bleeding.

## 2023-05-31 NOTE — ED PROVIDER NOTE - PHYSICAL EXAMINATION
CONSTITUTIONAL:  NAD;   SKIN:  warm, dry;   HEAD:  NCAT;   EYES: + mild watery discharge consistent with allergies, no conjunctival erythema, otherwise NL inspection;   ENT: + posterior oropharynx mildly erythematous with mild edema, no tonsillar exudates, no lymphadenopathy, uvula midline, + mild clear rhinorrhea, no purulent nasal discharge, MMM;   NECK: supple; normal ROM;   CARD: cyanosis  RESP: no increased work of breathing;   MSK:  no extremity injury/deformity;   NEURO:  grossly unremarkable;   PSYCH:  cooperative, appropriate;

## 2023-05-31 NOTE — ED ADULT NURSE NOTE - NSFALLUNIVINTERV_ED_ALL_ED
Bed/Stretcher in lowest position, wheels locked, appropriate side rails in place/Call bell, personal items and telephone in reach/Instruct patient to call for assistance before getting out of bed/chair/stretcher/Non-slip footwear applied when patient is off stretcher/[place] to call system/Physically safe environment - no spills, clutter or unnecessary equipment/Purposeful proactive rounding/Room/bathroom lighting operational, light cord in reach

## 2023-05-31 NOTE — ED PROVIDER NOTE - IV ALTEPLASE EXCL ABS HIDDEN

## 2023-06-02 ENCOUNTER — OUTPATIENT (OUTPATIENT)
Dept: OUTPATIENT SERVICES | Facility: HOSPITAL | Age: 28
LOS: 1 days | End: 2023-06-02
Payer: MEDICAID

## 2023-06-02 ENCOUNTER — ASOB RESULT (OUTPATIENT)
Age: 28
End: 2023-06-02

## 2023-06-02 ENCOUNTER — APPOINTMENT (OUTPATIENT)
Dept: ANTEPARTUM | Facility: CLINIC | Age: 28
End: 2023-06-02
Payer: MEDICAID

## 2023-06-02 DIAGNOSIS — Z34.90 ENCOUNTER FOR SUPERVISION OF NORMAL PREGNANCY, UNSPECIFIED, UNSPECIFIED TRIMESTER: ICD-10-CM

## 2023-06-02 DIAGNOSIS — Z90.49 ACQUIRED ABSENCE OF OTHER SPECIFIED PARTS OF DIGESTIVE TRACT: Chronic | ICD-10-CM

## 2023-06-02 PROCEDURE — 76805 OB US >/= 14 WKS SNGL FETUS: CPT | Mod: 26

## 2023-06-02 PROCEDURE — 76805 OB US >/= 14 WKS SNGL FETUS: CPT

## 2023-06-06 DIAGNOSIS — Z03.74 ENCOUNTER FOR SUSPECTED PROBLEM WITH FETAL GROWTH RULED OUT: ICD-10-CM

## 2023-06-06 DIAGNOSIS — Z3A.15 15 WEEKS GESTATION OF PREGNANCY: ICD-10-CM

## 2023-06-06 DIAGNOSIS — O36.80X0 PREGNANCY WITH INCONCLUSIVE FETAL VIABILITY, NOT APPLICABLE OR UNSPECIFIED: ICD-10-CM

## 2023-06-15 ENCOUNTER — NON-APPOINTMENT (OUTPATIENT)
Age: 28
End: 2023-06-15

## 2023-06-15 ENCOUNTER — OUTPATIENT (OUTPATIENT)
Dept: OUTPATIENT SERVICES | Facility: HOSPITAL | Age: 28
LOS: 1 days | End: 2023-06-15
Payer: MEDICAID

## 2023-06-15 ENCOUNTER — APPOINTMENT (OUTPATIENT)
Dept: OBGYN | Facility: CLINIC | Age: 28
End: 2023-06-15
Payer: MEDICAID

## 2023-06-15 VITALS — BODY MASS INDEX: 26.83 KG/M2 | SYSTOLIC BLOOD PRESSURE: 120 MMHG | WEIGHT: 142 LBS | DIASTOLIC BLOOD PRESSURE: 74 MMHG

## 2023-06-15 DIAGNOSIS — Z34.90 ENCOUNTER FOR SUPERVISION OF NORMAL PREGNANCY, UNSPECIFIED, UNSPECIFIED TRIMESTER: ICD-10-CM

## 2023-06-15 DIAGNOSIS — Z90.49 ACQUIRED ABSENCE OF OTHER SPECIFIED PARTS OF DIGESTIVE TRACT: Chronic | ICD-10-CM

## 2023-06-15 LAB
BILIRUB UR QL STRIP: NORMAL
CLARITY UR: CLEAR
COLLECTION METHOD: NORMAL
GLUCOSE UR-MCNC: NORMAL
HCG UR QL: 0.2 EU/DL
HGB UR QL STRIP.AUTO: NORMAL
KETONES UR-MCNC: NORMAL
LEUKOCYTE ESTERASE UR QL STRIP: NORMAL
NITRITE UR QL STRIP: NORMAL
PH UR STRIP: 6.5
PROT UR STRIP-MCNC: NORMAL
SP GR UR STRIP: 1.01

## 2023-06-15 PROCEDURE — 82105 ALPHA-FETOPROTEIN SERUM: CPT

## 2023-06-15 PROCEDURE — 81002 URINALYSIS NONAUTO W/O SCOPE: CPT

## 2023-06-15 PROCEDURE — 99213 OFFICE O/P EST LOW 20 MIN: CPT

## 2023-06-18 LAB
AFP MOM: 0.74
AFP VALUE: 30 NG/ML
ALPHA FETOPROTEIN SERUM COMMENT: NORMAL
ALPHA FETOPROTEIN SERUM INTERPRETATION: NORMAL
ALPHA FETOPROTEIN SERUM RESULTS: NORMAL
ALPHA FETOPROTEIN SERUM TEST RESULTS: NORMAL
GESTATIONAL AGE BASED ON: NORMAL
GESTATIONAL AGE ON COLLECTION DATE: 16.9 WEEKS
INSULIN DEP DIABETES: NO
MATERNAL AGE AT EDD AFP: 29.3 YR
MULTIPLE GESTATION: NO
OSBR RISK 1 IN: NORMAL
RACE: NORMAL
WEIGHT AFP: 142 LBS

## 2023-06-21 DIAGNOSIS — Z34.90 ENCOUNTER FOR SUPERVISION OF NORMAL PREGNANCY, UNSPECIFIED, UNSPECIFIED TRIMESTER: ICD-10-CM

## 2023-06-29 ENCOUNTER — APPOINTMENT (OUTPATIENT)
Dept: OTOLARYNGOLOGY | Facility: CLINIC | Age: 28
End: 2023-06-29
Payer: MEDICAID

## 2023-06-29 VITALS — HEIGHT: 61 IN | BODY MASS INDEX: 26.81 KG/M2 | WEIGHT: 142 LBS

## 2023-06-29 DIAGNOSIS — H61.22 IMPACTED CERUMEN, LEFT EAR: ICD-10-CM

## 2023-06-29 DIAGNOSIS — H93.8X9 OTHER SPECIFIED DISORDERS OF EAR, UNSPECIFIED EAR: ICD-10-CM

## 2023-06-29 DIAGNOSIS — R07.0 PAIN IN THROAT: ICD-10-CM

## 2023-06-29 PROCEDURE — 31575 DIAGNOSTIC LARYNGOSCOPY: CPT

## 2023-06-29 PROCEDURE — 99213 OFFICE O/P EST LOW 20 MIN: CPT | Mod: 25

## 2023-06-29 RX ORDER — FAMOTIDINE 40 MG/1
40 TABLET, FILM COATED ORAL
Qty: 30 | Refills: 3 | Status: ACTIVE | COMMUNITY
Start: 2023-06-29 | End: 1900-01-01

## 2023-06-29 NOTE — PROCEDURE
[Flexible Endoscope] : examined with the flexible endoscope [True Vocal Cords Erythematous] : bilateral true vocal cord edema [Glottis Arytenoid Cartilages Erythema] : bilateral arytenoid ~M erythema [Normal] : posterior cricoid area had healthy pink mucosa in the interarytenoid area and the esophageal inlet [de-identified] : edema

## 2023-06-29 NOTE — PHYSICAL EXAM
[de-identified] : edema  [Normal] : mucosa is normal [Midline] : trachea located in midline position

## 2023-06-29 NOTE — HISTORY OF PRESENT ILLNESS
Pt was called voicemail; was left informing pt of her normal lab results and that she RH Negative. Pt was instructed to call clinic with any questions or concerns. See note below.        ----- Message from Elizabeth Francis CNM sent at 5/13/2021  8:34 AM CDT -----  Please call this patient and notify her that these labs are within normal range. She is Rh negative and she will receive rhogam at her 28 week visit.  Please advise her that she MUST contact the office with ANY vaginal bleeding.    Thank you.    Elizabeth Francis CNM       
[FreeTextEntry1] : Turkish ID: [id number]\par Patient returns today c/o dysphagia, throat pain. States right side throat she is having difficulty swallowing and eating. Finding self throw up frequently due food getting stuck in right side of throat. Previously seen by Dr. Morales, prescribed Amoxicillin-Pot Clavulanate, methylPrednisolone- did not use. Patient is currently pregnant.

## 2023-07-10 ENCOUNTER — OUTPATIENT (OUTPATIENT)
Dept: OUTPATIENT SERVICES | Facility: HOSPITAL | Age: 28
LOS: 1 days | End: 2023-07-10
Payer: MEDICAID

## 2023-07-10 ENCOUNTER — ASOB RESULT (OUTPATIENT)
Age: 28
End: 2023-07-10

## 2023-07-10 ENCOUNTER — APPOINTMENT (OUTPATIENT)
Dept: ANTEPARTUM | Facility: CLINIC | Age: 28
End: 2023-07-10
Payer: MEDICAID

## 2023-07-10 DIAGNOSIS — Z34.90 ENCOUNTER FOR SUPERVISION OF NORMAL PREGNANCY, UNSPECIFIED, UNSPECIFIED TRIMESTER: ICD-10-CM

## 2023-07-10 DIAGNOSIS — Z90.49 ACQUIRED ABSENCE OF OTHER SPECIFIED PARTS OF DIGESTIVE TRACT: Chronic | ICD-10-CM

## 2023-07-10 PROCEDURE — 76817 TRANSVAGINAL US OBSTETRIC: CPT | Mod: 26

## 2023-07-10 PROCEDURE — 76805 OB US >/= 14 WKS SNGL FETUS: CPT | Mod: 26

## 2023-07-10 PROCEDURE — 76817 TRANSVAGINAL US OBSTETRIC: CPT

## 2023-07-10 PROCEDURE — 76805 OB US >/= 14 WKS SNGL FETUS: CPT

## 2023-07-12 DIAGNOSIS — Z36.3 ENCOUNTER FOR ANTENATAL SCREENING FOR MALFORMATIONS: ICD-10-CM

## 2023-07-12 DIAGNOSIS — Z3A.20 20 WEEKS GESTATION OF PREGNANCY: ICD-10-CM

## 2023-07-13 ENCOUNTER — OUTPATIENT (OUTPATIENT)
Dept: OUTPATIENT SERVICES | Facility: HOSPITAL | Age: 28
LOS: 1 days | End: 2023-07-13
Payer: MEDICAID

## 2023-07-13 ENCOUNTER — APPOINTMENT (OUTPATIENT)
Dept: OBGYN | Facility: CLINIC | Age: 28
End: 2023-07-13
Payer: MEDICAID

## 2023-07-13 ENCOUNTER — RESULT CHARGE (OUTPATIENT)
Age: 28
End: 2023-07-13

## 2023-07-13 VITALS
DIASTOLIC BLOOD PRESSURE: 69 MMHG | SYSTOLIC BLOOD PRESSURE: 108 MMHG | HEIGHT: 61 IN | BODY MASS INDEX: 25.86 KG/M2 | WEIGHT: 137 LBS | HEART RATE: 109 BPM

## 2023-07-13 DIAGNOSIS — Z34.90 ENCOUNTER FOR SUPERVISION OF NORMAL PREGNANCY, UNSPECIFIED, UNSPECIFIED TRIMESTER: ICD-10-CM

## 2023-07-13 PROCEDURE — 99213 OFFICE O/P EST LOW 20 MIN: CPT

## 2023-07-13 PROCEDURE — 81002 URINALYSIS NONAUTO W/O SCOPE: CPT

## 2023-07-13 RX ORDER — METHYLPREDNISOLONE 4 MG/1
4 TABLET ORAL
Qty: 1 | Refills: 0 | Status: ACTIVE | COMMUNITY
Start: 2023-03-16 | End: 1900-01-01

## 2023-07-13 RX ORDER — AMOXICILLIN AND CLAVULANATE POTASSIUM 875; 125 MG/1; MG/1
875-125 TABLET, COATED ORAL
Qty: 20 | Refills: 0 | Status: ACTIVE | COMMUNITY
Start: 2023-03-16 | End: 1900-01-01

## 2023-07-14 ENCOUNTER — OUTPATIENT (OUTPATIENT)
Dept: OUTPATIENT SERVICES | Facility: HOSPITAL | Age: 28
LOS: 1 days | End: 2023-07-14
Payer: MEDICAID

## 2023-07-14 ENCOUNTER — NON-APPOINTMENT (OUTPATIENT)
Age: 28
End: 2023-07-14

## 2023-07-14 ENCOUNTER — APPOINTMENT (OUTPATIENT)
Dept: GASTROENTEROLOGY | Facility: CLINIC | Age: 28
End: 2023-07-14
Payer: MEDICAID

## 2023-07-14 VITALS — HEIGHT: 61 IN | HEART RATE: 96 BPM | BODY MASS INDEX: 25.68 KG/M2 | OXYGEN SATURATION: 99 % | WEIGHT: 136 LBS

## 2023-07-14 DIAGNOSIS — Z00.00 ENCOUNTER FOR GENERAL ADULT MEDICAL EXAMINATION WITHOUT ABNORMAL FINDINGS: ICD-10-CM

## 2023-07-14 DIAGNOSIS — Z90.49 ACQUIRED ABSENCE OF OTHER SPECIFIED PARTS OF DIGESTIVE TRACT: Chronic | ICD-10-CM

## 2023-07-14 DIAGNOSIS — K82.8 OTHER SPECIFIED DISEASES OF GALLBLADDER: ICD-10-CM

## 2023-07-14 DIAGNOSIS — K21.9 GASTRO-ESOPHAGEAL REFLUX DISEASE W/OUT ESOPHAGITIS: ICD-10-CM

## 2023-07-14 LAB
BILIRUB UR QL STRIP: NEGATIVE
CLARITY UR: CLEAR
COLLECTION METHOD: NORMAL
GLUCOSE UR-MCNC: NEGATIVE
HCG UR QL: 0.2 EU/DL
HGB UR QL STRIP.AUTO: NEGATIVE
KETONES UR-MCNC: NORMAL
LEUKOCYTE ESTERASE UR QL STRIP: NEGATIVE
NITRITE UR QL STRIP: NEGATIVE
PH UR STRIP: 6.5
PROT UR STRIP-MCNC: NORMAL
SP GR UR STRIP: 1.02

## 2023-07-14 PROCEDURE — 80053 COMPREHEN METABOLIC PANEL: CPT

## 2023-07-14 PROCEDURE — 85027 COMPLETE CBC AUTOMATED: CPT

## 2023-07-14 PROCEDURE — 99204 OFFICE O/P NEW MOD 45 MIN: CPT

## 2023-07-14 PROCEDURE — 83036 HEMOGLOBIN GLYCOSYLATED A1C: CPT

## 2023-07-14 PROCEDURE — 36415 COLL VENOUS BLD VENIPUNCTURE: CPT

## 2023-07-14 PROCEDURE — 84443 ASSAY THYROID STIM HORMONE: CPT

## 2023-07-14 PROCEDURE — 80061 LIPID PANEL: CPT

## 2023-07-14 RX ORDER — PANTOPRAZOLE 40 MG/1
40 TABLET, DELAYED RELEASE ORAL TWICE DAILY
Qty: 60 | Refills: 0 | Status: ACTIVE | COMMUNITY
Start: 2023-07-14 | End: 1900-01-01

## 2023-07-18 LAB
ALBUMIN SERPL ELPH-MCNC: 4.6 G/DL
ALP BLD-CCNC: 72 U/L
ALT SERPL-CCNC: 19 U/L
ANION GAP SERPL CALC-SCNC: 17 MMOL/L
AST SERPL-CCNC: 22 U/L
BILIRUB SERPL-MCNC: 0.4 MG/DL
BUN SERPL-MCNC: 5 MG/DL
CALCIUM SERPL-MCNC: 9.7 MG/DL
CHLORIDE SERPL-SCNC: 100 MMOL/L
CHOLEST SERPL-MCNC: 193 MG/DL
CO2 SERPL-SCNC: 20 MMOL/L
CREAT SERPL-MCNC: 0.6 MG/DL
EGFR: 125 ML/MIN/1.73M2
ESTIMATED AVERAGE GLUCOSE: 103 MG/DL
GLUCOSE SERPL-MCNC: 87 MG/DL
HBA1C MFR BLD HPLC: 5.2 %
HDLC SERPL-MCNC: 46 MG/DL
LDLC SERPL CALC-MCNC: 99 MG/DL
NONHDLC SERPL-MCNC: 147 MG/DL
POTASSIUM SERPL-SCNC: 3.6 MMOL/L
PROT SERPL-MCNC: 6.9 G/DL
SODIUM SERPL-SCNC: 137 MMOL/L
TRIGL SERPL-MCNC: 241 MG/DL
TSH SERPL-ACNC: 1.01 UIU/ML

## 2023-07-18 NOTE — PHYSICAL EXAM
[Alert] : alert [Normal Voice/Communication] : normal voice/communication [No Acute Distress] : no acute distress [Sclera] : the sclera and conjunctiva were normal [Normal Appearance] : the appearance of the neck was normal [No Respiratory Distress] : no respiratory distress [No Acc Muscle Use] : no accessory muscle use [Heart Rate And Rhythm] : heart rate was normal and rhythm regular [Abdomen Soft] : soft [RUQ] : RUQ [Abnormal Walk] : normal gait [] : no rash [Oriented To Time, Place, And Person] : oriented to person, place, and time [Ascites: ___] : no ascites [Rebound Tenderness] : no rebound tenderness [de-identified] : Mild RUQ ttp

## 2023-07-18 NOTE — HISTORY OF PRESENT ILLNESS
[FreeTextEntry1] : 27F with PMHx of dysphagia presents to GI clinic for evaluation. Pt is currently pregnant,  Week 20. Pt reports difficulty swallowing and globus sensation in the right throat since 2023. Pregnancy started around that same time. \par \par She endorses difficulty swallowing, food getting stuck in right throat area, following by almost immediate nausea and NBNB vomiting. Pt reports ~20 lbs weight loss since February. Dysphagia started with solids and quickly progressed to solids + liquids, now vomits almost everything she eats. Pt endorses over time started having heartburn and sore throat d/t recurrent vomiting. Endorses choking and regurgitation episodes while eating. Now eats mostly liquid diet.\par \par Pt also complains of intermittent RUQ pain after any food intake. Denies changes to urine or stool color or shoulder pain. Pain is relieved within a few minutes after onset. Had multiple RUQ US in  showing hepatic steatosis (mildly elevated LFTs this year) and GB stones/sludge with no evidence of cholecystitis or CBD dilation. \par \par Denies EtOH, tobacco, or substance use\par No FHx of GI malignancy\par Never had EGD/Colon\par

## 2023-07-18 NOTE — ASSESSMENT
[FreeTextEntry1] : 27F with PMHx of dysphagia presents to GI clinic for evaluation. Pt is currently pregnant,  Week 20. Pt reports difficulty swallowing and globus sensation in the right throat since 2023.\par \par #Dysphagia\par #GERD\par - Dysphagia and recurrent vomiting after any food intake, progressed from solids -> Liquids since February\par - 20 lbs unintentional weight loss despite pregnancy d/t recurrent vomiting\par - Heartburn after any food intake and globus sensation in the right throat\par - ENT eval: Laryngoscopy showed bilateral TVC edema, no focal masses/obstruction\par - Did not improve on pepcid qhs prn alone, never tried PPI\par - No FHx of GI malignancy. Denies alcohol, tobacco, or substance use\par \par Plan\par - Trial protonix 40mg BID x2 weeks to be taken 30 mins before meals\par - c/w pepcid qhs PRN while on protonix\par - Advised patient on eating small, frequent meals. Cutting up food into small pieces after starting protonix (and attempting to up-titrate diet from liquids to small / soft solids). Sitting upright for 30 minutes after meals and avoiding eating within 2-3 hrs of bedtime.\par - F/u in 2 weeks, will re-evaluate need for possible EGD is she fails to improve after 2 week trial of protonix\par \par #Hepatic steatosis\par #GB sludge - doubt biliary colic\par - Pt reports intermittent RUQ pain after any ingestion, unrelated to type of food consumed\par - Multiple RUQ US and CT abdomen in  showed hepatic steatosis and GB sludge vs GB stone without any evidence of cholecystitis, CBD dilation, or GB polyp\par - Elevated AST/ALT previously up to 1.2//75/113\par - Fib 4 -> 0.83, less likely to have any advanced fibrosis\par \par Plan\par - F/u repeat LFTs\par - F/u in 2 weeks\par - Will re-evaluate after pregnancy

## 2023-07-18 NOTE — END OF VISIT
[] : Resident [FreeTextEntry3] : Agree with above. Healthy 27-year-old woman who is currently pregnant in the second trimester, being evaluated for dysphagia and globus sensation on the right throat.  Patient has progressively worsening dysphagia to solids leading to around a 20 pound weight loss.  Her diet currently consists of fluids only.  She complained of heartburn and food getting stuck in the right throat area leading to recurrent vomiting after meals.  She states that the vomiting occurs immediately or within an hour of eating.\par She saw ENT on 6/29/2023 and underwent laryngoscopy revealing bilateral true vocal cord edema and arytenoid erythema suggestive of laryngopharyngeal reflux disease.\par Symptoms could be secondary to GERD but cannot exclude other underlying pathology. Her symptoms do not suggest hyperemesis gravidarum as she has dysphagia and her symptoms are worsening with progression of pregnancy.\par She tolerates famotidine with minimal improvement.\par -Pantoprazole 40 mg twice a day to be taken 30 minutes before meals x 2 weeks\par -Continue current diet but advance slowly to soft diet.  Advised to chew food deliberately and cut into small pieces.\par -Follow-up after 2 weeks of PPI therapy and consider EGD during the second trimester if she fails to improve and continues to lose weight. [Time Spent: ___ minutes] : I have spent [unfilled] minutes of time on the encounter.

## 2023-07-18 NOTE — REASON FOR VISIT
[Initial Evaluation] : an initial evaluation [Interpreters_IDNumber] : [id number] [Interpreters_FullName] : Husam [TWNoteComboBox1] : [place]

## 2023-07-18 NOTE — REVIEW OF SYSTEMS
[Recent Weight Loss (___ Lbs)] : recent [unfilled] ~Ulb weight loss [Sore Throat] : sore throat [Abdominal Pain] : abdominal pain [Vomiting] : vomiting [Heartburn] : heartburn [Chills] : no chills [Fever] : no fever [Scleral Icterus (Yellow Eyes)] : no scleral icterus [Chest Pain] : no chest pain [Lower Ext Edema (lower leg swelling)] : no lower extremity edema [Shortness Of Breath] : no shortness of breath [Constipation] : no constipation [Diarrhea] : no diarrhea [Melena (black stool)] : no melena [Bleeding] : no bleeding [Bloating (gassiness)] : no bloating [Rash] : no rash [Arthralgias (joint pain)] : no arthralgias [Itching] : no itching [Jaundice (yellowing of skin)] : no jaundice [FreeTextEntry2] : 20 lbs weight loss over 5 months

## 2023-07-19 DIAGNOSIS — Z34.90 ENCOUNTER FOR SUPERVISION OF NORMAL PREGNANCY, UNSPECIFIED, UNSPECIFIED TRIMESTER: ICD-10-CM

## 2023-07-19 DIAGNOSIS — K76.0 FATTY (CHANGE OF) LIVER, NOT ELSEWHERE CLASSIFIED: ICD-10-CM

## 2023-07-19 DIAGNOSIS — R13.10 DYSPHAGIA, UNSPECIFIED: ICD-10-CM

## 2023-07-19 DIAGNOSIS — K82.8 OTHER SPECIFIED DISEASES OF GALLBLADDER: ICD-10-CM

## 2023-07-19 DIAGNOSIS — K21.9 GASTRO-ESOPHAGEAL REFLUX DISEASE WITHOUT ESOPHAGITIS: ICD-10-CM

## 2023-07-21 ENCOUNTER — OUTPATIENT (OUTPATIENT)
Dept: OUTPATIENT SERVICES | Facility: HOSPITAL | Age: 28
LOS: 1 days | End: 2023-07-21
Payer: MEDICAID

## 2023-07-21 DIAGNOSIS — Z01.20 ENCOUNTER FOR DENTAL EXAMINATION AND CLEANING WITHOUT ABNORMAL FINDINGS: ICD-10-CM

## 2023-07-21 DIAGNOSIS — Z90.49 ACQUIRED ABSENCE OF OTHER SPECIFIED PARTS OF DIGESTIVE TRACT: Chronic | ICD-10-CM

## 2023-07-21 PROCEDURE — D0230: CPT

## 2023-07-21 PROCEDURE — D0220: CPT

## 2023-07-21 PROCEDURE — D0150: CPT

## 2023-07-21 PROCEDURE — D0330: CPT

## 2023-07-21 PROCEDURE — D1110: CPT

## 2023-07-26 ENCOUNTER — APPOINTMENT (OUTPATIENT)
Dept: GASTROENTEROLOGY | Facility: CLINIC | Age: 28
End: 2023-07-26
Payer: MEDICAID

## 2023-07-26 ENCOUNTER — OUTPATIENT (OUTPATIENT)
Dept: OUTPATIENT SERVICES | Facility: HOSPITAL | Age: 28
LOS: 1 days | End: 2023-07-26
Payer: MEDICAID

## 2023-07-26 VITALS
DIASTOLIC BLOOD PRESSURE: 72 MMHG | WEIGHT: 135 LBS | HEIGHT: 61 IN | HEART RATE: 76 BPM | BODY MASS INDEX: 25.49 KG/M2 | TEMPERATURE: 97.8 F | SYSTOLIC BLOOD PRESSURE: 101 MMHG | OXYGEN SATURATION: 99 %

## 2023-07-26 DIAGNOSIS — K76.0 FATTY (CHANGE OF) LIVER, NOT ELSEWHERE CLASSIFIED: ICD-10-CM

## 2023-07-26 DIAGNOSIS — Z90.49 ACQUIRED ABSENCE OF OTHER SPECIFIED PARTS OF DIGESTIVE TRACT: Chronic | ICD-10-CM

## 2023-07-26 DIAGNOSIS — Z00.00 ENCOUNTER FOR GENERAL ADULT MEDICAL EXAMINATION WITHOUT ABNORMAL FINDINGS: ICD-10-CM

## 2023-07-26 DIAGNOSIS — R13.10 DYSPHAGIA, UNSPECIFIED: ICD-10-CM

## 2023-07-26 PROCEDURE — 99213 OFFICE O/P EST LOW 20 MIN: CPT | Mod: GC

## 2023-07-26 PROCEDURE — 99213 OFFICE O/P EST LOW 20 MIN: CPT

## 2023-07-26 NOTE — REVIEW OF SYSTEMS
[Constipation] : constipation [Negative] : Neurological [Abdominal Pain] : no abdominal pain [Vomiting] : no vomiting [Bleeding] : no bleeding [FreeTextEntry7] : dysphagia

## 2023-07-26 NOTE — HISTORY OF PRESENT ILLNESS
[FreeTextEntry1] : 27F with PMHx of dysphagia presents to GI clinic for follow up.\par Patient presented 2 weeks ago difficulty swallowing and globus sensation in the right throat since 2023. she had ~20 lbs weight loss since February. Dysphagia is for solids. \par Since her last visit, patient reports that food is still getting stuck on the right side of her throat. And after few hours from eating, she has food regurgitation, gagging and vomiting. \par \par To note, patient had multiple RUQ US in  showing hepatic steatosis (mildly elevated LFTs this year) and GB stones/sludge with no evidence of cholecystitis or CBD dilation. \par Pt is currently pregnant,  Week 21. \par \par \par

## 2023-07-26 NOTE — ASSESSMENT
[FreeTextEntry1] : 27F with PMHx of dysphagia presents to GI clinic for follow up. Pt is currently pregnant,  Week 21. Pt reports difficulty swallowing and globus sensation in the right throat since 2023.\par \par #Dysphagia\par #GERD\par - Dysphagia and recurrent vomiting after eating solids \par - 20 lbs unintentional weight loss despite pregnancy d/t recurrent vomiting\par - Heartburn after any food intake and globus sensation in the right throat\par - ENT eval: Laryngoscopy showed bilateral TVC edema, no focal masses/obstruction\par - Did not improve on pepcid qhs prn alone and PPI BID for 2 weeks\par - No FHx of GI malignancy. Denies alcohol, tobacco, or substance use\par \par #Hepatic steatosis\par #GB sludge - doubt biliary colic\par - Multiple RUQ US and CT abdomen in  showed hepatic steatosis and GB sludge vs GB stone without any evidence of cholecystitis, CBD dilation, or GB polyp\par - Elevated AST/ALT previously up to 1.2//75/113\par - Fib 4 -> 0.83, less likely to have any advanced fibrosis\par \par Plan:\par - Patient needs barium swallow; follow up with OBGYN for clearance of imaging\par - Counselled about mashed diet food

## 2023-07-27 ENCOUNTER — NON-APPOINTMENT (OUTPATIENT)
Age: 28
End: 2023-07-27

## 2023-07-31 DIAGNOSIS — Z01.21 ENCOUNTER FOR DENTAL EXAMINATION AND CLEANING WITH ABNORMAL FINDINGS: ICD-10-CM

## 2023-08-03 DIAGNOSIS — K76.0 FATTY (CHANGE OF) LIVER, NOT ELSEWHERE CLASSIFIED: ICD-10-CM

## 2023-08-03 DIAGNOSIS — R13.10 DYSPHAGIA, UNSPECIFIED: ICD-10-CM

## 2023-08-17 ENCOUNTER — APPOINTMENT (OUTPATIENT)
Dept: OBGYN | Facility: CLINIC | Age: 28
End: 2023-08-17

## 2023-08-24 ENCOUNTER — APPOINTMENT (OUTPATIENT)
Dept: OTOLARYNGOLOGY | Facility: CLINIC | Age: 28
End: 2023-08-24

## 2023-08-31 ENCOUNTER — APPOINTMENT (OUTPATIENT)
Dept: OBGYN | Facility: CLINIC | Age: 28
End: 2023-08-31

## 2023-09-21 NOTE — ED PROVIDER NOTE - PATIENT PORTAL LINK FT
You can access the FollowMyHealth Patient Portal offered by [facility] by registering at the following website: [email or web address]. By joining [employer]’s FollowMyHealth portal, you will also be able to view your health information using other applications (apps) compatible with our system. Dupixent Counseling: I discussed with the patient the risks of dupilumab including but not limited to eye infection and irritation, cold sores, injection site reactions, worsening of asthma, allergic reactions and increased risk of parasitic infection.  Live vaccines should be avoided while taking dupilumab. Dupilumab will also interact with certain medications such as warfarin and cyclosporine. The patient understands that monitoring is required and they must alert us or the primary physician if symptoms of infection or other concerning signs are noted.

## 2023-10-05 ENCOUNTER — APPOINTMENT (OUTPATIENT)
Dept: ANTEPARTUM | Facility: CLINIC | Age: 28
End: 2023-10-05

## 2023-10-13 NOTE — H&P ADULT - NSHPPHYSICALEXAM_GEN_ALL_CORE
bilateral
PAST MEDICAL & SURGICAL HISTORY:  No pertinent past medical history  Appendectomy  FAMILY HISTORY:  SOCIAL HISTORY: Denies cigarette use, alcohol use, or illicit drug use  Vital Signs Last 24 Hrs  T(F): 99.7 (14 Apr 2023 02:40), Max: 99.7 (14 Apr 2023 02:40)  HR: 86 (14 Apr 2023 02:42) (86 - 86)  BP: 116/66 (14 Apr 2023 02:42) (116/66 - 116/66)  RR: 16 (14 Apr 2023 02:42) (16 - 16)  Height (cm): 152.4 (04-14-23 @ 02:42)      General Appearance - AAOx3, NAD  Abdomen - Soft, nontender, nondistended, no rebound, no rigidity, no guarding, bowel sounds present  Back: right flank tenderness

## 2024-03-21 ENCOUNTER — APPOINTMENT (OUTPATIENT)
Dept: DERMATOLOGY | Facility: CLINIC | Age: 29
End: 2024-03-21

## 2024-05-08 NOTE — ED PROVIDER NOTE - PHYSICAL EXAMINATION
Constitutional: Well developed, well nourished. NAD  Head: Normocephalic, atraumatic.  Eyes: PERRL, EOMI.  ENT: No nasal discharge. Mucous membranes dry.  Neck: Supple. Painless ROM.  Cardiovascular: Regular rate and rhythm.    Pulmonary: Lungs clear to auscultation bilaterally.   Abdominal: Soft.  moderate epigastric and RUQ abdominal pain without rebound, guarding or flank pain  Extremities. Pelvis stable. No lower extremity edema, symmetric calves.  Skin: No rashes, cyanosis.  Neuro: AAOx3. No focal neurological deficits.  Psych: Normal mood. Normal affect. Yes

## 2024-05-29 ENCOUNTER — APPOINTMENT (OUTPATIENT)
Dept: OPHTHALMOLOGY | Facility: CLINIC | Age: 29
End: 2024-05-29

## 2024-09-05 ENCOUNTER — APPOINTMENT (OUTPATIENT)
Dept: DERMATOLOGY | Facility: CLINIC | Age: 29
End: 2024-09-05

## 2024-10-05 NOTE — ED ADULT TRIAGE NOTE - ESI TRIAGE ACUITY LEVEL, MLM
Counseled on alcohol cessation   The ECG showed paced rhythm. The patient has a temporary pacemaker. The pacemaker rate was 76 bpm. The pacemaker output was 20 mA. The pacemaker output was 2 mV. 3

## 2024-10-10 NOTE — DISCHARGE NOTE ANTEPARTUM - FALL HARM RISK - PT AGE POPULATION HIDDEN
completely fine.       Eval: Patient reports that he had been dealing with left posterior hip /low back pain for close to a year. States that he has a history of right  hip labrum issues receiving surgery to replace the labrum in 2019. The left hip/LB problem has progressed over the last several months. Says he is use to working out and doing a little biking. He would like to all of these activities without worrying about pain .        Test used Initial score  9/3/24 10/10/2024   Pain Summary VAS  1-2/10 L posterior superior glute  0-2/10 R anterior hip   Functional questionnaire WOMAC       Other:              Pain:  Pain location: Left hip  Patient describes pain to be dull and aching  Pain decreases with:  resting   Pain increases with:  twisting      Living status: Ranch style home with a basement     Occupation/School:  Work/School Status: Full time  Job Duties/Demands: Prolonged Sitting  NA    Hand Dominance: Right    Sport/ Recreation/ Leisure/ Hobbies:  Running, working out at the Gym     Review Of Systems (ROS):  [x] Performed Review of systems (Integumentary, CardioPulmonary, Neurological) by intake and observation. Intake form has been scanned into medical record. Patient has been instructed to contact their primary care physician regarding ROS issues if not already being addressed at this time.    [x] Patient history, allergies, meds reviewed. Medical chart reviewed. See intake form.     OBJECTIVE EXAMINATION   10/1:  HIP AROM:  ER: 37 L, 25 R  IR: 42 L, 30 R    9/19: Significant limitation with GAYE on L compared to R, tenderness noted posterior/superior glute on L, significant tenderness noted on R anterior hip/RF/Sartorious.     9/3/24  ROM/Strength: (Blank cells denote NT)      Mvmt (norm) AROM L AROM R Notes PROM L PROM R Notes     LUMBAR Flex (90) 84        Ext (25) 35        SB (25) 25 28 pain in Left Pain with L SB       Rotation (30)             HIP Flex (120)          Abd (45)          ER (50) 
Adult

## 2025-04-15 ENCOUNTER — EMERGENCY (EMERGENCY)
Facility: HOSPITAL | Age: 30
LOS: 0 days | Discharge: HOME | End: 2025-04-15
Attending: EMERGENCY MEDICINE
Payer: MEDICAID

## 2025-04-15 VITALS
RESPIRATION RATE: 18 BRPM | OXYGEN SATURATION: 98 % | TEMPERATURE: 98 F | DIASTOLIC BLOOD PRESSURE: 87 MMHG | HEART RATE: 85 BPM | SYSTOLIC BLOOD PRESSURE: 130 MMHG | WEIGHT: 158.73 LBS

## 2025-04-15 DIAGNOSIS — R68.84 JAW PAIN: ICD-10-CM

## 2025-04-15 DIAGNOSIS — H57.89 OTHER SPECIFIED DISORDERS OF EYE AND ADNEXA: ICD-10-CM

## 2025-04-15 DIAGNOSIS — M26.621 ARTHRALGIA OF RIGHT TEMPOROMANDIBULAR JOINT: ICD-10-CM

## 2025-04-15 DIAGNOSIS — Z90.49 ACQUIRED ABSENCE OF OTHER SPECIFIED PARTS OF DIGESTIVE TRACT: Chronic | ICD-10-CM

## 2025-04-15 PROCEDURE — 99283 EMERGENCY DEPT VISIT LOW MDM: CPT | Mod: 25

## 2025-04-15 PROCEDURE — 96372 THER/PROPH/DIAG INJ SC/IM: CPT

## 2025-04-15 PROCEDURE — 99283 EMERGENCY DEPT VISIT LOW MDM: CPT

## 2025-04-15 RX ORDER — METHOCARBAMOL 500 MG/1
2 TABLET, FILM COATED ORAL
Qty: 10 | Refills: 0
Start: 2025-04-15 | End: 2025-04-19

## 2025-04-15 RX ORDER — METHOCARBAMOL 500 MG/1
1000 TABLET, FILM COATED ORAL ONCE
Refills: 0 | Status: COMPLETED | OUTPATIENT
Start: 2025-04-15 | End: 2025-04-15

## 2025-04-15 RX ORDER — KETOROLAC TROMETHAMINE 30 MG/ML
30 INJECTION, SOLUTION INTRAMUSCULAR; INTRAVENOUS ONCE
Refills: 0 | Status: DISCONTINUED | OUTPATIENT
Start: 2025-04-15 | End: 2025-04-15

## 2025-04-15 RX ADMIN — KETOROLAC TROMETHAMINE 30 MILLIGRAM(S): 30 INJECTION, SOLUTION INTRAMUSCULAR; INTRAVENOUS at 19:21

## 2025-04-15 RX ADMIN — METHOCARBAMOL 1000 MILLIGRAM(S): 500 TABLET, FILM COATED ORAL at 19:21

## 2025-04-15 NOTE — ED PROVIDER NOTE - PHYSICAL EXAMINATION
CONSTITUTIONAL: Well-developed; well-nourished; in no acute distress, nontoxic appearing  SKIN: skin exam is warm and dry  HEAD: Normocephalic; atraumatic  EYES: PERRL, conjunctiva and sclera clear. L eye with pinguecula, no erythema/swelling. EOMI  ENT: MMM, +TTP overlying R TMJ, no overlying skin changes, tolerating secretions, no muffled voice or drooling. TMs patent bilaterally   NECK: Supple; non tender.  ROM intact.  RESP: Good air movement bilaterally  NEURO: awake, alert, following commands, oriented, grossly unremarkable. No Focal deficits. GCS 15.   PSYCH: Cooperative, appropriate.

## 2025-04-15 NOTE — ED PROVIDER NOTE - PATIENT PORTAL LINK FT
You can access the FollowMyHealth Patient Portal offered by [facility] by registering at the following website: [email or web address]. By joining "[employer]"’s FollowMyHealth portal, you will also be able to view your health information using other applications (apps) compatible with our system.

## 2025-04-15 NOTE — ED PROVIDER NOTE - OBJECTIVE STATEMENT
29 year old female, no past medical history, who presents with right jaw pain. patient with right jaw pain worse when opening mouth x3 days. patient admits to grinding teeth @ night. denies f/c, sore throat, difficulty swallowing, nausea/vomiting, skin changes. no trauma. patient also reports chronic L eye redness intermittent, fu with optho outpatient. denies vision changes, eye pain, eye swelling. does not wear contacts or glasses.

## 2025-04-15 NOTE — ED PROVIDER NOTE - CLINICAL SUMMARY MEDICAL DECISION MAKING FREE TEXT BOX
VSS.  No distress.  Airway widely patent.  Exam consistent with TMJ.  No acute ED intervention.  Meds and DC home.  F/U with OMFS/dental.  Strict return instructions discussed.

## 2025-04-15 NOTE — ED PROVIDER NOTE - NSFOLLOWUPINSTRUCTIONS_ED_ALL_ED_FT
Por favor, consulte con chisholm médico de cabecera y OMFS en 1 a 3 días.  Por favor, esté atento a cualquier signo o síntoma nuevo o que empeore y que deba indicarle que regrese a urgencias.    Trastorno temporomandibular    LO QUE NECESITA SABER:    El trastorno temporomandibular es esmer condición que provoca dolor en la mandíbula. El trastorno afecta la articulación entre el hueso temporal y la mandíbula (maxilar). Los nervios de los músculos alrededor de la articulación también se afectan.  Mandíbula    MIENTRAS USTED ESTÁ AQUÍ:    Consentimiento informadoes un documento legal que explica los exámenes, tratamientos, o procedimientos que usted podría necesitar. Un consentimiento informado significa que usted comprende que es lo que se va a realizar y que puede rell decisiones sobre lo que usted desee. Usted da chisholm permiso al firmar el formulario de consentimiento. Puede designar a otra persona para que firme irma formulario por usted si usted no puede hacerlo. Usted tiene el derecho de comprender chisholm cuidado médico en términos o palabras que usted pueda entender. Antes de firmar el documento de consentimiento, entienda los riesgos y beneficios de lo que se le hará. Asegúrese que todas leisa preguntas shaun contestadas.    Alimentos blandos:Chisholm médico podría sugerirle que consuma solo alimentos blandos por varios días. Un dietista podría trabajar con usted para encontrar los alimentos que son más fáciles de morder, masticar y tragar. Los ejemplos son sopa, puré de manzana, requesón, pudín, yogurt y frutas blandas.    Medicamentos:    Antibiótico:Estos se utilizan para evitar o tratar esmer infección que se provoca por esmer bacteria.    Analgésicos:Es posible que le receten un medicamento para aliviar el dolor. No espere hasta que chisholm dolor esté muy albert para solicitar más medicamento.    Toxina botulínica (bótox):Esta podría inyectarse en los músculos de la mandíbula para disminuir el dolor.    Medicamentos esteroides:Estos podrían inyectarse en la articulación para disminuir el dolor y la inflamación.    Relajantes muscularesayudan a reducir dolor y espasmos musculares.  Exámenes:    Radiografía:Usted podría necesitar radiografías del cráneo, de la mandíbula o de los dientes.    Artrograma:McKenzie es esmer radiografía que utiliza líquido de contraste para ayudar a que las imágenes se vean mejor. Dígale al médico si usted alguna vez ha tenido esmer reacción alérgica al líquido de contraste.    Imágenes por resonancia magnética (IRM):Éste examen usa imanes de poder y esmer computadora para rell imágenes de la mandíbula. Es posible que le administren un líquido de contraste para que las imágenes se aprecien mejor. Dígale al médico si usted alguna vez ha tenido esmer reacción alérgica al líquido de contraste. No entre a la liz donde se realiza la resonancia magnética con algo de metal. El metal puede causar lesiones serias. Dígale al médico si usted tiene algo de metal dentro de chisholm cuerpo o por encima.    Exploración ósea:Esmer gammagrafía ósea se utiliza para detectar enfermedades o daños en los huesos. Le administrarán un líquido radioactivo, llamado trazador, por esmer vena del brazo. El trazador se acumula en leisa huesos. Luego se marsha imágenes para buscar algún problema. Ejemplos de problemas en los huesos incluyen fracturas e infección.  Tratamiento:    Dispositivos de apoyo para la mandíbula:Podrían usarse tablillas para apoyar la mandíbula o evitar que se mueva. Es posible que usted necesite usar un protector bucal para evitar que apriete o rechine los dientes mientras duerme.    Fisioterapia:Un fisioterapeuta le enseña ejercicios para ayudarlo a mejorar el movimiento y la fuerza, así fabio para disminuir el dolor en la mandíbula. Un terapista de lenguaje podría ayudarlo con ejercicios para tragar y hablar.    Cirugía:Es posible que usted necesite de esmer cirugía para arreglarle los dientes, el maxilar o las articulaciones.  RIESGOS:    Usted podría sangrar o contraer esmer infección si le realizan esmer cirugía. Si no recibe tratamiento, la condición podría empeorar. Es posible que tenga dificultad para respirar, comer, beber, hablar o abrir la boca. Si no se trata a tiempo, el trastorno temporomadibular podría conllevar a lesiones permanentes, fabio daño a los nervios, deformidad o parálisis.    ACUERDOS SOBRE CHISHOLM CUIDADO:    Usted tiene el derecho de ayudar a planear chisholm cuidado. Aprenda todo lo que pueda sobre chisholm condición y fabio darle tratamiento. Discuta leisa opciones de tratamiento con leisa médicos para decidir el cuidado que usted desea recibir. Usted siempre tiene el derecho de rechazar el tratamiento.

## 2025-04-15 NOTE — ED PROVIDER NOTE - NSFOLLOWUPCLINICS_GEN_ALL_ED_FT
[facility]  Dental  [address] [place] [address]  Phone: [phone]  Fax:   Follow Up Time: 1-3 Days    [facility]  Ophthalmolgy  [address], [address]  [place] [address]  Phone: [phone]  Fax:   Follow Up Time: 1-3 Days

## 2025-04-17 ENCOUNTER — EMERGENCY (EMERGENCY)
Facility: HOSPITAL | Age: 30
LOS: 0 days | Discharge: HOME | End: 2025-04-18
Attending: STUDENT IN AN ORGANIZED HEALTH CARE EDUCATION/TRAINING PROGRAM
Payer: MEDICAID

## 2025-04-17 VITALS
SYSTOLIC BLOOD PRESSURE: 121 MMHG | RESPIRATION RATE: 17 BRPM | DIASTOLIC BLOOD PRESSURE: 83 MMHG | TEMPERATURE: 98 F | OXYGEN SATURATION: 98 % | HEART RATE: 56 BPM

## 2025-04-17 VITALS
TEMPERATURE: 98 F | OXYGEN SATURATION: 98 % | HEART RATE: 91 BPM | DIASTOLIC BLOOD PRESSURE: 88 MMHG | WEIGHT: 160.06 LBS | RESPIRATION RATE: 16 BRPM | SYSTOLIC BLOOD PRESSURE: 147 MMHG

## 2025-04-17 DIAGNOSIS — Z90.49 ACQUIRED ABSENCE OF OTHER SPECIFIED PARTS OF DIGESTIVE TRACT: Chronic | ICD-10-CM

## 2025-04-17 LAB
ALBUMIN SERPL ELPH-MCNC: 5.1 G/DL — SIGNIFICANT CHANGE UP (ref 3.5–5.2)
ALP SERPL-CCNC: 115 U/L — SIGNIFICANT CHANGE UP (ref 30–115)
ALT FLD-CCNC: 41 U/L — SIGNIFICANT CHANGE UP (ref 0–41)
ANION GAP SERPL CALC-SCNC: 11 MMOL/L — SIGNIFICANT CHANGE UP (ref 7–14)
APPEARANCE UR: CLEAR — SIGNIFICANT CHANGE UP
APTT BLD: 36.8 SEC — SIGNIFICANT CHANGE UP (ref 27–39.2)
AST SERPL-CCNC: 28 U/L — SIGNIFICANT CHANGE UP (ref 0–41)
BASOPHILS # BLD AUTO: 0.07 K/UL — SIGNIFICANT CHANGE UP (ref 0–0.2)
BASOPHILS NFR BLD AUTO: 0.8 % — SIGNIFICANT CHANGE UP (ref 0–1)
BILIRUB SERPL-MCNC: 0.7 MG/DL — SIGNIFICANT CHANGE UP (ref 0.2–1.2)
BILIRUB UR-MCNC: NEGATIVE — SIGNIFICANT CHANGE UP
BUN SERPL-MCNC: 11 MG/DL — SIGNIFICANT CHANGE UP (ref 10–20)
CALCIUM SERPL-MCNC: 10.1 MG/DL — SIGNIFICANT CHANGE UP (ref 8.4–10.5)
CHLORIDE SERPL-SCNC: 105 MMOL/L — SIGNIFICANT CHANGE UP (ref 98–110)
CO2 SERPL-SCNC: 24 MMOL/L — SIGNIFICANT CHANGE UP (ref 17–32)
COLOR SPEC: YELLOW — SIGNIFICANT CHANGE UP
CREAT SERPL-MCNC: 0.7 MG/DL — SIGNIFICANT CHANGE UP (ref 0.7–1.5)
DIFF PNL FLD: NEGATIVE — SIGNIFICANT CHANGE UP
EGFR: 119 ML/MIN/1.73M2 — SIGNIFICANT CHANGE UP
EGFR: 119 ML/MIN/1.73M2 — SIGNIFICANT CHANGE UP
EOSINOPHIL # BLD AUTO: 0.09 K/UL — SIGNIFICANT CHANGE UP (ref 0–0.7)
EOSINOPHIL NFR BLD AUTO: 1.1 % — SIGNIFICANT CHANGE UP (ref 0–8)
GLUCOSE SERPL-MCNC: 84 MG/DL — SIGNIFICANT CHANGE UP (ref 70–99)
GLUCOSE UR QL: NEGATIVE MG/DL — SIGNIFICANT CHANGE UP
HCG SERPL QL: NEGATIVE — SIGNIFICANT CHANGE UP
HCT VFR BLD CALC: 43.7 % — SIGNIFICANT CHANGE UP (ref 37–47)
HGB BLD-MCNC: 14.9 G/DL — SIGNIFICANT CHANGE UP (ref 12–16)
IMM GRANULOCYTES NFR BLD AUTO: 0.2 % — SIGNIFICANT CHANGE UP (ref 0.1–0.3)
INR BLD: 0.93 RATIO — SIGNIFICANT CHANGE UP (ref 0.65–1.3)
KETONES UR-MCNC: ABNORMAL MG/DL
LACTATE SERPL-SCNC: 0.9 MMOL/L — SIGNIFICANT CHANGE UP (ref 0.7–2)
LEUKOCYTE ESTERASE UR-ACNC: ABNORMAL
LYMPHOCYTES # BLD AUTO: 2.67 K/UL — SIGNIFICANT CHANGE UP (ref 1.2–3.4)
LYMPHOCYTES # BLD AUTO: 31.5 % — SIGNIFICANT CHANGE UP (ref 20.5–51.1)
MCHC RBC-ENTMCNC: 30.6 PG — SIGNIFICANT CHANGE UP (ref 27–31)
MCHC RBC-ENTMCNC: 34.1 G/DL — SIGNIFICANT CHANGE UP (ref 32–37)
MCV RBC AUTO: 89.7 FL — SIGNIFICANT CHANGE UP (ref 81–99)
MONOCYTES # BLD AUTO: 0.43 K/UL — SIGNIFICANT CHANGE UP (ref 0.1–0.6)
MONOCYTES NFR BLD AUTO: 5.1 % — SIGNIFICANT CHANGE UP (ref 1.7–9.3)
NEUTROPHILS # BLD AUTO: 5.2 K/UL — SIGNIFICANT CHANGE UP (ref 1.4–6.5)
NEUTROPHILS NFR BLD AUTO: 61.3 % — SIGNIFICANT CHANGE UP (ref 42.2–75.2)
NITRITE UR-MCNC: NEGATIVE — SIGNIFICANT CHANGE UP
NRBC BLD AUTO-RTO: 0 /100 WBCS — SIGNIFICANT CHANGE UP (ref 0–0)
PH UR: 6.5 — SIGNIFICANT CHANGE UP (ref 5–8)
PLATELET # BLD AUTO: 292 K/UL — SIGNIFICANT CHANGE UP (ref 130–400)
PMV BLD: 10.4 FL — SIGNIFICANT CHANGE UP (ref 7.4–10.4)
POTASSIUM SERPL-MCNC: 4.2 MMOL/L — SIGNIFICANT CHANGE UP (ref 3.5–5)
POTASSIUM SERPL-SCNC: 4.2 MMOL/L — SIGNIFICANT CHANGE UP (ref 3.5–5)
PROT SERPL-MCNC: 8.4 G/DL — HIGH (ref 6–8)
PROT UR-MCNC: NEGATIVE MG/DL — SIGNIFICANT CHANGE UP
PROTHROM AB SERPL-ACNC: 10.9 SEC — SIGNIFICANT CHANGE UP (ref 9.95–12.87)
RBC # BLD: 4.87 M/UL — SIGNIFICANT CHANGE UP (ref 4.2–5.4)
RBC # FLD: 12.3 % — SIGNIFICANT CHANGE UP (ref 11.5–14.5)
SODIUM SERPL-SCNC: 140 MMOL/L — SIGNIFICANT CHANGE UP (ref 135–146)
SP GR SPEC: 1.01 — SIGNIFICANT CHANGE UP (ref 1–1.03)
UROBILINOGEN FLD QL: 0.2 MG/DL — SIGNIFICANT CHANGE UP (ref 0.2–1)
WBC # BLD: 8.48 K/UL — SIGNIFICANT CHANGE UP (ref 4.8–10.8)
WBC # FLD AUTO: 8.48 K/UL — SIGNIFICANT CHANGE UP (ref 4.8–10.8)

## 2025-04-17 PROCEDURE — 36415 COLL VENOUS BLD VENIPUNCTURE: CPT

## 2025-04-17 PROCEDURE — 99285 EMERGENCY DEPT VISIT HI MDM: CPT | Mod: 25

## 2025-04-17 PROCEDURE — 93005 ELECTROCARDIOGRAM TRACING: CPT

## 2025-04-17 PROCEDURE — 70487 CT MAXILLOFACIAL W/DYE: CPT | Mod: MC

## 2025-04-17 PROCEDURE — 85025 COMPLETE CBC W/AUTO DIFF WBC: CPT

## 2025-04-17 PROCEDURE — 87040 BLOOD CULTURE FOR BACTERIA: CPT

## 2025-04-17 PROCEDURE — 83605 ASSAY OF LACTIC ACID: CPT

## 2025-04-17 PROCEDURE — 84703 CHORIONIC GONADOTROPIN ASSAY: CPT

## 2025-04-17 PROCEDURE — 81001 URINALYSIS AUTO W/SCOPE: CPT

## 2025-04-17 PROCEDURE — 80053 COMPREHEN METABOLIC PANEL: CPT

## 2025-04-17 PROCEDURE — 85730 THROMBOPLASTIN TIME PARTIAL: CPT

## 2025-04-17 PROCEDURE — 70487 CT MAXILLOFACIAL W/DYE: CPT | Mod: 26

## 2025-04-17 PROCEDURE — 99285 EMERGENCY DEPT VISIT HI MDM: CPT

## 2025-04-17 PROCEDURE — 85610 PROTHROMBIN TIME: CPT

## 2025-04-17 PROCEDURE — 93010 ELECTROCARDIOGRAM REPORT: CPT

## 2025-04-17 RX ORDER — KETOROLAC TROMETHAMINE 30 MG/ML
15 INJECTION, SOLUTION INTRAMUSCULAR; INTRAVENOUS ONCE
Refills: 0 | Status: DISCONTINUED | OUTPATIENT
Start: 2025-04-17 | End: 2025-04-17

## 2025-04-17 RX ORDER — ACETAMINOPHEN 500 MG/5ML
1000 LIQUID (ML) ORAL ONCE
Refills: 0 | Status: COMPLETED | OUTPATIENT
Start: 2025-04-17 | End: 2025-04-17

## 2025-04-17 RX ORDER — DEXAMETHASONE 0.5 MG/1
10 TABLET ORAL ONCE
Refills: 0 | Status: COMPLETED | OUTPATIENT
Start: 2025-04-17 | End: 2025-04-17

## 2025-04-17 RX ORDER — SODIUM CHLORIDE 9 G/1000ML
2300 INJECTION, SOLUTION INTRAVENOUS ONCE
Refills: 0 | Status: COMPLETED | OUTPATIENT
Start: 2025-04-17 | End: 2025-04-17

## 2025-04-17 RX ORDER — AMPICILLIN SODIUM AND SULBACTAM SODIUM 1; .5 G/1; G/1
3 INJECTION, POWDER, FOR SOLUTION INTRAMUSCULAR; INTRAVENOUS ONCE
Refills: 0 | Status: COMPLETED | OUTPATIENT
Start: 2025-04-17 | End: 2025-04-17

## 2025-04-17 RX ADMIN — Medication 400 MILLIGRAM(S): at 17:41

## 2025-04-17 RX ADMIN — KETOROLAC TROMETHAMINE 15 MILLIGRAM(S): 30 INJECTION, SOLUTION INTRAMUSCULAR; INTRAVENOUS at 17:41

## 2025-04-17 RX ADMIN — KETOROLAC TROMETHAMINE 15 MILLIGRAM(S): 30 INJECTION, SOLUTION INTRAMUSCULAR; INTRAVENOUS at 22:04

## 2025-04-17 RX ADMIN — DEXAMETHASONE 102 MILLIGRAM(S): 0.5 TABLET ORAL at 17:41

## 2025-04-17 RX ADMIN — SODIUM CHLORIDE 2300 MILLILITER(S): 9 INJECTION, SOLUTION INTRAVENOUS at 16:10

## 2025-04-17 RX ADMIN — AMPICILLIN SODIUM AND SULBACTAM SODIUM 200 GRAM(S): 1; .5 INJECTION, POWDER, FOR SOLUTION INTRAMUSCULAR; INTRAVENOUS at 16:10

## 2025-04-17 NOTE — ED PROVIDER NOTE - PATIENT PORTAL LINK FT
You can access the FollowMyHealth Patient Portal offered by [facility] by registering at the following website: [email or web address]. By joining [employer]’s FollowMyHealth portal, you will also be able to view your health information using other applications (apps) compatible with our system.

## 2025-04-17 NOTE — ED PROVIDER NOTE - PROGRESS NOTE DETAILS
[place], DO: CT max face shows a right torn meniscal TMJ.  Patient still complaining about inability to open mouth.  OMFS coverage is going to be by general surgery.  They were called, consulted, teams message sent.  They will evaluate the patient and determine disposition.  All questions were answered to the patient.  I requested any pain medication at this time. CT scan noted, and OMFS consulted.  Patient signed out to Dr. Anderson. oMFS evaluated pt, Dr Wes Jara recommending puree diet, continue methocarbamol, and follow up outpt dentist.

## 2025-04-17 NOTE — ED PROVIDER NOTE - ATTENDING CONTRIBUTION TO CARE
I personally evaluated the patient. I reviewed the Resident’s note (as assigned above), and agree with the findings and plan except as documented in my note.    29-year-old female with a history of hyperemesis gravidarum in 2023, but states that she has no active medical history, presenting to the ED for right-sided facial pain occurring for the past 5 days.  Patient states the pain is worse with opening her mouth, and states that she has been grinding her teeth at night.  She was seen in the ED 2 days ago, and at that time only had right-sided jaw pain, and discharged on Robaxin for possible TMJ, but also advised to follow-up with dental.  She returns today, as her symptoms now worsened to the extent of having chills, swelling  of the right side of her face, and worsening pain in her right jaw, radiating to her ear. I personally evaluated the patient. I reviewed the Resident’s note (as assigned above), and agree with the findings and plan except as documented in my note.    29-year-old female with a history of hyperemesis gravidarum in 2023, but states that she has no active medical history, presenting to the ED for right-sided facial pain occurring for the past 5 days.  Patient states the pain is worse with opening her mouth, and states that she has been grinding her teeth at night.  She was seen in the ED 2 days ago, and at that time only had right-sided jaw pain, and discharged on Robaxin for possible TMJ, but also advised to follow-up with dental.  She returns today, as her symptoms now worsened to the extent of having chills, swelling  of the right side of her face, and worsening pain in her right jaw, radiating to her ear.     Vital signs reviewed  General: Well appearing, comfortable  Skin: Warm, dry  Head & neck: NCAT, supple neck  Eyes: EOMI, PER B/L  ENT: MMM; no angioedema; patent airway with midline and non-edematous uvula, no tongue swelling or elevation; no erythema or exudates of oropharynx or tonsils; no palpable dental abscesses; TMs clear B/L, right mastoid without surrounding erythema, edema, tenderness, no protrusion of auricle; right clear external ear canal  Card: RRR, S1, S2; no murmurs, no rubs, no gallops  Resp: Normal respiratory effort, CTAB, no rales, no wheezing  Abd: Soft, ND, NT, no rebound, no guarding; no CVAT  Ext: Pulses palpable distally  NeuroMSK: Grossly intact  Psych: AAOx3, cooperative, appropriate    Differential diagnoses considered including but not limited to: Dental abscess, Elvin's angina, otitis media, mastoiditis    External documents reviewed: prior charts.     Labs reviewed, and interpreted by me.     ECG: My interpretation, in absence of a cardiologist: NSR, rate 84, , QRS 88, QTc 458, no acute ischemic changes    Imaging: reviewed. ***    Treatment/interventions: Analgesics.  Effects were reassessed.    Consults/provider discussions: ***    Decision rules/scores utilized: ***    ED course: ***    1) No acute emergent pathology suspected at this time that would require immediate intervention or hospitalization. Discussed results with the patient, as well as the plan of care and counseling regarding the presentation. After complete ED evaluation and observation, patient was found to be stable to discharge home. Opportunity for questions was provided, and patient comfortable with discharge. Patient provided copies of all results, including incidental findings, and advised to follow up with primary care.  Return precautions given.      2) Patient requires observation/inpatient hospitalization for further evaluation and treatment in a monitored setting.  ------------------------------------------------------------------------------------------------------------------------ I personally evaluated the patient. I reviewed the Resident’s note (as assigned above), and agree with the findings and plan except as documented in my note.     used: name Michelle, ID [id number].     29-year-old female with a history of hyperemesis gravidarum in 2023, but states that she has no active medical history, presenting to the ED for right-sided facial pain occurring for the past 5 days.  Patient states the pain is worse with opening her mouth, and states that she has been grinding her teeth at night.  She was seen in the ED 2 days ago, and at that time only had right-sided jaw pain, and discharged on Robaxin for possible TMJ, but also advised to follow-up with dental.  She returns today, as her symptoms now worsened to the extent of having chills, swelling  of the right side of her face, and worsening pain in her right jaw, radiating to her ear.     Vital signs reviewed  General: Well appearing, comfortable  Skin: Warm, dry  Head & neck: NCAT, supple neck  Eyes: EOMI, PER B/L  ENT: MMM; no angioedema; patent airway with midline and non-edematous uvula, no tongue swelling or elevation; no erythema or exudates of oropharynx or tonsils; no palpable dental abscesses; TMs clear B/L, right mastoid without surrounding erythema, edema, tenderness, no protrusion of auricle; right clear external ear canal  Card: RRR, S1, S2; no murmurs, no rubs, no gallops  Resp: Normal respiratory effort, CTAB, no rales, no wheezing  Abd: Soft, ND, NT, no rebound, no guarding; no CVAT  Ext: Pulses palpable distally  NeuroMSK: Grossly intact  Psych: AAOx3, cooperative, appropriate    Labs obtained and reviewed, no acute abnormalities.  Patient feeling somewhat better after Toradol, Decadron, and acetaminophen.  CT scan noted, with as consulted.  Patient signed out to Dr. Anderson, pending [facility] recommendations, reassessment, and disposition.

## 2025-04-17 NOTE — ED PROVIDER NOTE - CLINICAL SUMMARY MEDICAL DECISION MAKING FREE TEXT BOX
Throughout ED observation period, pt remained clinically and hemodynamically stable.  no airway compromise or e/o impending airway  Lab results as interpreted by me- no anemia, no significant electrolyte abnormalities, normal renal function  no e/o acute infection   ct findings as noted and dental consulted  dental rec comp msk relaxer, soft diet, OMFS f/u

## 2025-04-17 NOTE — ED PROVIDER NOTE - NSFOLLOWUPINSTRUCTIONS_ED_ALL_ED_FT
Trastorno temporomandibular    LO QUE NECESITA SABER:    ¿Qué es un trastorno temporomandibular?El trastorno temporomandibular es esmer condición que provoca dolor en la mandíbula. El trastorno afecta la articulación entre el hueso temporal y la mandíbula (maxilar). Los nervios de los músculos alrededor de la articulación también se afectan.  Mandíbula    ¿Qué provoca el trastorno temporomandibular?    La dislocación de un disco del cartílago en la articulación    Deformaciones de la mandíbula    Inflamación, infección, artritis, problemas musculares o tumores en el área de la mandíbula    Lesión o fractura de la mandíbula    Tensión muscular debido a masticar, a apretar los dientes o a rechinarlos  ¿Cuáles son los signos y síntomas de un trastorno temporomandibular?    Un jv de estallido o rechinido de los dientes cuando se abre o ulises la mandíbula    Dolor de david o dolor en la mandíbula, oído, renny o james    Dolor o inflamación de los músculos de la mandíbula    Hormigueo o adormecimiento en la mandíbula o james    Dificultad para abrir o cerrar la boca, o la mandíbula se traba  ¿Cómo se diagnostica un trastorno temporomandibular?El médico le examinará la mandíbula, la james y el renny. Chisholm médico le preguntará acerca de leisa afecciones médicas o lesiones. También es posible que deba hacerse los siguientes exámenes:    Radiografía:Usted podría necesitar radiografías del cráneo, de la mandíbula o de los dientes.    Artrograma:Lafe es esmer radiografía que utiliza líquido de contraste para ayudar a que las imágenes se vean mejor. Dígale al médico si usted alguna vez ha tenido esmer reacción alérgica al líquido de contraste.    Imágenes por resonancia magnética (IRM):Éste examen usa imanes de poder y esmer computadora para rell imágenes de la mandíbula. Es posible que le administren un líquido de contraste para que las imágenes se aprecien mejor. Dígale al médico si usted alguna vez ha tenido esmer reacción alérgica al líquido de contraste. No entre a la liz donde se realiza la resonancia magnética con algo de metal. El metal puede causar lesiones serias. Dígale al médico si usted tiene algo de metal dentro de chisholm cuerpo o por encima.    Exploración ósea:Esmer gammagrafía ósea se utiliza para detectar enfermedades o daños en los huesos. Le administrarán un líquido radioactivo, llamado trazador, por esmer vena del brazo. El trazador se acumula en leisa huesos. Luego se marsha imágenes para buscar algún problema. Ejemplos de problemas en los huesos incluyen fracturas e infección.  ¿Cómo se trata el trastorno temporomandibular?    Medicamentos:  RODO (analgésicos antiinflamatorios no esteroides):Estos medicamentos disminuyen la inflamación y el dolor. Usted puede adquirir los medicamentos RODO sin receta médica. Pregunte a chisholm médico cuál medicamento es adecuado para usted y cuánto debería rell. Tómelos fabio se le indique. Cuando no se marsha de la manera indicada, los medicamentos antiinflamatorios no esteroides pueden causar sangrado estomacal o problemas renales.    Toxina botulínica (bótox):Esta podría inyectarse en los músculos de la mandíbula para disminuir el dolor.    Medicamentos esteroides:Estos podrían inyectarse en la articulación para disminuir el dolor y la inflamación.    Relajantes muscularesayudan a reducir dolor y espasmos musculares.    Cirugía:Es posible que usted necesite de esmer cirugía para arreglarle los dientes, el maxilar o las articulaciones.  ¿Cuáles son los riesgos del trastorno temporomadibular?Usted podría sangrar o contraer esemr infección si le realizan esmer cirugía. Si no recibe tratamiento, la condición podría empeorar. Es posible que tenga dificultad para respirar, comer, beber, hablar o abrir la boca. Si no se trata a tiempo, el trastorno temporomadibular podría conllevar a lesiones permanentes, fabio daño a los nervios, deformidad o parálisis.    ¿Cómo puedo controlar los síntomas?    Consuma alimentos blandos:Chisholm médico podría sugerirle que consuma solo alimentos blandos por varios días. Un dietista podría trabajar con usted para encontrar los alimentos que son más fáciles de morder, masticar y tragar. Los ejemplos son sopa, puré de manzana, requesón, pudín, yogurt y frutas blandas.    Utilice dispositivos de apoyo para la mandíbula:Podrían usarse tablillas para apoyar la mandíbula o evitar que se mueva. Es posible que usted necesite usar un protector bucal para evitar que apriete o rechine los dientes mientras duerme.    Use hielo y calor:El hielo ayuda a disminuir la inflamación y el dolor. El hielo también puede contribuir a evitar el daño de los tejidos. Use esmer compresa de hielo o ponga hielo triturado en esmer bolsa de plástico. Cúbrala con esmer toalla y colóquela sobre la mandíbula por 15 a 20 minutos cada hora o fabio se le indique. Después de las primeras 24 a 48 horas, use calor para disminuir el dolor, la inflamación y los espasmos musculares. Aplíquese calor en el área lesionada hortencia 20 a 30 minutos cada 2 horas hortencia la cantidad de días que le indiquen.    Vaya a terapia física:Un fisioterapeuta le enseña ejercicios para ayudarlo a mejorar el movimiento y la fuerza, así fabio para disminuir el dolor en la mandíbula. Un terapista de lenguaje podría ayudarlo con ejercicios para tragar y hablar.  ¿Cuándo flora comunicarme con mi médico?    Tiene fiebre.    La tablilla o protector bucal están flojos.    Usted tiene preguntas o inquietudes acerca de chisholm condición o cuidado.  ¿Cuándo flora buscar atención inmediata o llamar al 911?    Tiene náusea, vómitos o no puede retener líquidos en el estómago.    Usted tiene dolor que no se rahul, incluso después de rell el medicamento para el dolor.    Tiene dificultad para respirar, hablar, beber, comer o tragar.    La tablilla o el protector bucal se dañan o se quiebran.  ACUERDOS SOBRE CHISHOLM CUIDADO:    Usted tiene el derecho de ayudar a planear chisholm cuidado. Aprenda todo lo que pueda sobre chisholm condición y fabio darle tratamiento. Discuta leisa opciones de tratamiento con leisa médicos para decidir el cuidado que usted desea recibir. Usted siempre tiene el derecho de rechazar el tratamiento. TEMPOROMANDIBULAR DISORDER - Discharge Care   **follow up with your dentist, please eat a puree diet and continue to take methocarbamol as prescribed**    Temporomandibular Disorder    WHAT YOU NEED TO KNOW:    Temporomandibular disorder is a condition that causes pain in your jaw. The disorder affects the joint between your temporal bone and your mandible (jawbone). The muscles and nerves around the joint are also affected.     Jaw       DISCHARGE INSTRUCTIONS:    Medicines:   •Pain medicine: You may be given a prescription medicine to decrease pain. Do not wait until the pain is severe before you take this medicine.      •NSAIDs: These medicines decrease swelling and pain. You can buy NSAIDs without a doctor's order. Ask your healthcare provider which medicine is right for you, and how much to take. Take as directed. NSAIDs can cause stomach bleeding or kidney problems if not taken correctly.      •Muscle relaxers help decrease pain and muscle spasms.      •Take your medicine as directed. Contact your healthcare provider if you think your medicine is not helping or if you have side effects. Tell him or her if you are allergic to any medicine. Keep a list of the medicines, vitamins, and herbs you take. Include the amounts, and when and why you take them. Bring the list or the pill bottles to follow-up visits. Carry your medicine list with you in case of an emergency.      Follow up with your healthcare provider as directed: Write down your questions so you remember to ask them during your visits.     Manage your symptoms:   •Eat soft foods: Your healthcare provider may suggest that you eat only soft foods for several days. A dietitian may work with you to find foods that are easier to bite, chew, or swallow. Examples are soup, applesauce, cottage cheese, pudding, yogurt, and soft fruits.       •Use jaw supporting devices: Splints may be used to support your jaw or keep it from moving. You may need to wear a mouth guard to keep you from clenching or grinding your teeth while you are sleeping.      •Use ice and heat: Ice helps decrease swelling and pain. Ice may also help prevent tissue damage. Use an ice pack, or put crushed ice in a plastic bag. Cover it with a towel and place it on your jaw for 15 to 20 minutes every hour or as directed. After the first 24 to 48 hours, use heat to decrease pain, swelling, and muscle spasms. Apply heat on the area for 20 to 30 minutes every 2 hours for as many days as directed. Use a heating pad, moist warm compress, or a hot water bottle.       •Go to physical therapy: A physical therapist teaches you exercises to help improve movement and strength, and to decrease pain in your jaw. A speech therapist may also help you with swallowing and speech exercises.      Contact your healthcare provider if:   •You have a fever.      •Your splint or mouth guard is loose.      •You have questions or concerns about your condition or care.      Seek care immediately or call 911 if:   •You have nausea, are vomiting, or cannot keep liquids down.      •You have pain that does not go away even after you take your pain medicine.      •You have problems breathing, talking, drinking, eating, or swallowing.      •Your splint or mouth guard gets damaged or broken.

## 2025-04-17 NOTE — ED PROVIDER NOTE - OBJECTIVE STATEMENT
29-year-old female with no significant PMH presents for right jaw pain.  Started 5 days ago.  Seen in ED on 4/15 for similar presentation diagnosed with TMJ dysfunction and sent methocarbamol with outpatient dentistry.  Patient did not take the methocarbamol, was not able to be seen by dentistry.  Presents for worsening right jaw pain, difficulty opening mouth.  Says she is unable to eat. Notes R ear pain. No pain medication has worked. Denies fever, cough, chest pain, SOB, abdominal pain, N/V/D, dysuria. Endorses headache.

## 2025-04-17 NOTE — ED ADULT TRIAGE NOTE - HEART RATE (BEATS/MIN)
Problem: Breathing Pattern Ineffective  Goal: Effective Breathing Pattern  Outcome: Ongoing, Progressing     Problem: Gas Exchange Impaired  Goal: Optimal Gas Exchange  Outcome: Ongoing, Progressing      91

## 2025-04-17 NOTE — ED ADULT TRIAGE NOTE - TEMPERATURE IN FAHRENHEIT (DEGREES F)
Assumed care of patient. Bedside report received from Kanwal HENAO. Patient is in bed. Fall precautions in place. Call light and belongings within reach. Updated on POC, all questions and concerns answered at this time. No other needs at this time.      98.2

## 2025-04-17 NOTE — ED ADULT NURSE NOTE - CAS DISCH TRANSFER METHOD
Initiate Treatment: TAC 0.1% cream BID x 1-2 weeks to hands and feet Detail Level: Zone Render In Strict Bullet Format?: No Private car

## 2025-04-17 NOTE — ED PROVIDER NOTE - EKG/XRAY ADDITIONAL INFORMATION
Interpretation by  Dr. Mica Nathan, in absence of a cardiologist: NSR, rate 84, , QRS 88, QTc 458, no acute ischemic changes.

## 2025-04-17 NOTE — ED PROVIDER NOTE - PHYSICAL EXAMINATION
Vitals: Reviewed, vital signs stable.   General: NAD, uncomfortable.   Eyes: No conjunctivitis. PERRL, EOMI.  Ears: EAC clear. TMs cloudy.   Mouth: No exudate. No pharyngeal redness. MMM. Unable to fully open mouth.   Chest: RRR, no murmurs auscultated. Nontender chest wall.   Lungs: Good air movement throughout, no distress. LCTAB. Vitals: Reviewed, vital signs stable.   General: NAD, uncomfortable.   Eyes: No conjunctivitis. PERRL, EOMI.  Ears: EAC clear. TMs cloudy.   Mouth: No exudate. No pharyngeal redness. MMM. Unable to fully open mouth. No tenderness at TMJ/mastoid. No abscess seen intraorally.   Chest: RRR, no murmurs auscultated. Nontender chest wall.   Lungs: Good air movement throughout, no distress. LCTAB.

## 2025-04-18 NOTE — CONSULT NOTE ADULT - SUBJECTIVE AND OBJECTIVE BOX
Patient is a 29y old  Female who presents with a chief complaint of right jaw pain that started 5 days ago. She was in the ED on 4/15 for similar presentation and diagnosed with TMJ dysfunction and was given methocarbamol with outpatient dentistry. Patient did not take the medication and was not able to be seen by her outpatient dentist.    HPI:      PAST MEDICAL & SURGICAL HISTORY:  No pertinent past medical history      S/P appendectomy        ( - ) heart valve replacement  ( - ) joint replacement  ( - ) pregnancy    MEDICATIONS  (STANDING):    MEDICATIONS  (PRN):      Allergies    No Known Allergies    Intolerances        FAMILY HISTORY:      *SOCIAL HISTORY: (   ) Tobacco; (   ) ETOH    *Last Dental Visit:    Vital Signs Last 24 Hrs  T(C): 36.5 (2025 23:28), Max: 36.8 (2025 14:27)  T(F): 97.7 (2025 23:28), Max: 98.2 (2025 14:27)  HR: 56 (2025 23:28) (56 - 91)  BP: 121/83 (2025 23:28) (121/83 - 147/88)  BP(mean): --  RR: 17 (2025 23:28) (16 - 17)  SpO2: 98% (2025 23:28) (98% - 98%)    Parameters below as of 2025 23:28  Patient On (Oxygen Delivery Method): room air        LABS:                        14.9   8.48  )-----------( 292      ( 2025 16:00 )             43.7         140  |  105  |  11  ----------------------------<  84  4.2   |  24  |  0.7    Ca    10.1      2025 16:00    TPro  8.4[H]  /  Alb  5.1  /  TBili  0.7  /  DBili  x   /  AST  28  /  ALT  41  /  AlkPhos  115      WBC Count: 8.48 K/uL [4.80 - 10.80] ( @ 16:00)  Platelet Count - Automated: 292 K/uL [130 - 400] ( @ 16:00)  INR: 0.93 ratio [0.65 - 1.30] ( @ 16:00)    Urinalysis Basic - ( 2025 17:16 )    Color: Yellow / Appearance: Clear / S.008 / pH: x  Gluc: x / Ketone: Trace mg/dL  / Bili: Negative / Urobili: 0.2 mg/dL   Blood: x / Protein: Negative mg/dL / Nitrite: Negative   Leuk Esterase: Trace / RBC: 2 /HPF / WBC 3 /HPF   Sq Epi: x / Non Sq Epi: 9 /HPF / Bacteria: Few /HPF        PT/INR - ( 2025 16:00 )   PT: 10.90 sec;   INR: 0.93 ratio         PTT - ( 2025 16:00 )  PTT:36.8 sec  EOE:  TMJ ( - ) clicks                     ( - ) pops                     ( - ) crepitus             Mandible <<FROM>>             Facial bones and MOM <<grossly intact>>             ( + ) trismus             ( - ) lymphadenopathy             ( - ) swelling             ( - ) asymmetry             ( + ) palpation             ( - ) dyspnea             ( - ) dysphagia             ( - ) loss of consciousness    IOE:  <<permanent>> dentition: <<grossly intact>>           hard/soft palate: <<No pathology noted>>           tongue/FOM <<No pathology noted>>           labial/buccal mucosa <<No pathology noted>>           ( - ) percussion           ( + ) palpation           ( - ) swelling            ( - ) abscess           ( - ) sinus tract    Dentition present: <<y>>  Mobility: <<n>>  Caries: <<n>>         *DENTAL RADIOGRAPHS: None taken    RADIOLOGY & ADDITIONAL STUDIES: N/A    *ASSESSMENT: TMJ pain along right side of face from condylar area down along the right ramus.    *PLAN: No emergent dental care needed at this time. Patient needs to follow up with her outpatient dentist and take the medication methocarbamol that was given to her.    PROCEDURE:   Intraoral and extraoral examination completed.  Treatment: Limited Oral Examination    RECOMMENDATIONS:  1) Complete methacarbamol as prescribed.  2) Dental F/U with outpatient dentist.   3) If any difficulty swallowing/breathing, fever occur, return to ER.     Resident Name: Wes GRISSOM), dental resident

## 2025-04-21 ENCOUNTER — OUTPATIENT (OUTPATIENT)
Dept: OUTPATIENT SERVICES | Facility: HOSPITAL | Age: 30
LOS: 1 days | End: 2025-04-21

## 2025-04-21 DIAGNOSIS — Z90.49 ACQUIRED ABSENCE OF OTHER SPECIFIED PARTS OF DIGESTIVE TRACT: Chronic | ICD-10-CM

## 2025-04-21 DIAGNOSIS — K02.9 DENTAL CARIES, UNSPECIFIED: ICD-10-CM

## 2025-04-21 PROCEDURE — D0330: CPT

## 2025-04-21 PROCEDURE — D0140: CPT

## 2025-04-22 DIAGNOSIS — Z01.20 ENCOUNTER FOR DENTAL EXAMINATION AND CLEANING WITHOUT ABNORMAL FINDINGS: ICD-10-CM

## 2025-04-22 LAB
CULTURE RESULTS: SIGNIFICANT CHANGE UP
CULTURE RESULTS: SIGNIFICANT CHANGE UP
SPECIMEN SOURCE: SIGNIFICANT CHANGE UP
SPECIMEN SOURCE: SIGNIFICANT CHANGE UP

## 2025-05-21 ENCOUNTER — APPOINTMENT (OUTPATIENT)
Dept: OPHTHALMOLOGY | Facility: CLINIC | Age: 30
End: 2025-05-21
Payer: COMMERCIAL

## 2025-05-21 ENCOUNTER — OUTPATIENT (OUTPATIENT)
Dept: OUTPATIENT SERVICES | Facility: HOSPITAL | Age: 30
LOS: 1 days | End: 2025-05-21
Payer: COMMERCIAL

## 2025-05-21 DIAGNOSIS — H53.8 OTHER VISUAL DISTURBANCES: ICD-10-CM

## 2025-05-21 DIAGNOSIS — Z90.49 ACQUIRED ABSENCE OF OTHER SPECIFIED PARTS OF DIGESTIVE TRACT: Chronic | ICD-10-CM

## 2025-05-21 PROCEDURE — 92002 INTRM OPH EXAM NEW PATIENT: CPT

## 2025-05-29 DIAGNOSIS — H11.003 UNSPECIFIED PTERYGIUM OF EYE, BILATERAL: ICD-10-CM

## 2025-07-02 ENCOUNTER — OUTPATIENT (OUTPATIENT)
Dept: OUTPATIENT SERVICES | Facility: HOSPITAL | Age: 30
LOS: 1 days | End: 2025-07-02
Payer: COMMERCIAL

## 2025-07-02 ENCOUNTER — APPOINTMENT (OUTPATIENT)
Dept: OPHTHALMOLOGY | Facility: CLINIC | Age: 30
End: 2025-07-02
Payer: COMMERCIAL

## 2025-07-02 DIAGNOSIS — Z90.49 ACQUIRED ABSENCE OF OTHER SPECIFIED PARTS OF DIGESTIVE TRACT: Chronic | ICD-10-CM

## 2025-07-02 DIAGNOSIS — H53.8 OTHER VISUAL DISTURBANCES: ICD-10-CM

## 2025-07-02 PROCEDURE — 99212 OFFICE O/P EST SF 10 MIN: CPT

## 2025-07-07 ENCOUNTER — EMERGENCY (EMERGENCY)
Facility: HOSPITAL | Age: 30
LOS: 0 days | Discharge: HOME | End: 2025-07-07
Attending: STUDENT IN AN ORGANIZED HEALTH CARE EDUCATION/TRAINING PROGRAM
Payer: MEDICAID

## 2025-07-07 VITALS
HEART RATE: 99 BPM | TEMPERATURE: 98 F | DIASTOLIC BLOOD PRESSURE: 87 MMHG | WEIGHT: 180.34 LBS | SYSTOLIC BLOOD PRESSURE: 124 MMHG | OXYGEN SATURATION: 99 % | RESPIRATION RATE: 18 BRPM

## 2025-07-07 DIAGNOSIS — Z90.49 ACQUIRED ABSENCE OF OTHER SPECIFIED PARTS OF DIGESTIVE TRACT: Chronic | ICD-10-CM

## 2025-07-07 DIAGNOSIS — M26.601 RIGHT TEMPOROMANDIBULAR JOINT DISORDER, UNSPECIFIED: ICD-10-CM

## 2025-07-07 DIAGNOSIS — R68.84 JAW PAIN: ICD-10-CM

## 2025-07-07 PROCEDURE — 99283 EMERGENCY DEPT VISIT LOW MDM: CPT

## 2025-07-07 RX ORDER — IBUPROFEN 200 MG
600 TABLET ORAL ONCE
Refills: 0 | Status: COMPLETED | OUTPATIENT
Start: 2025-07-07 | End: 2025-07-07

## 2025-07-07 RX ADMIN — Medication 600 MILLIGRAM(S): at 08:06

## 2025-07-07 NOTE — ED PROVIDER NOTE - CLINICAL SUMMARY MEDICAL DECISION MAKING FREE TEXT BOX
pt with right sided TMJ,     normal TMs, orpharynx clear    Appropriate medications for patient's presenting complaints were ordered and effects were reassessed. Patient's external records were reviewed    Escalation to admission and/or observation was considered.  Patient feels much better and is comfortable with discharge.  Appropriate follow-up was arranged.

## 2025-07-07 NOTE — ED ADULT TRIAGE NOTE - CHIEF COMPLAINT QUOTE
Pt. c/o R sided mouth pain x 2 months. Mom states she has pain upon opening her mouth that travels to her ear.

## 2025-07-07 NOTE — ED PROVIDER NOTE - PHYSICAL EXAMINATION
VITAL SIGNS: I have reviewed nursing notes and confirm.  CONSTITUTIONAL: Well-developed; well-nourished; in no acute distress.  SKIN: Skin exam is warm and dry, no acute rash.  HEAD: Normocephalic; atraumatic.  MOUTH: no gum erythema, no abscess, airway intact, neck supple, no submandibular erythema, no Amanda's, needs a dental evaluation for TMJ  NECK: Supple; non tender.  NEURO: Alert, oriented. Grossly unremarkable. No focal deficits.  PSYCH: Cooperative, appropriate.

## 2025-07-07 NOTE — ED ADULT NURSE NOTE - NSFALLOOBATTEMPT_ED_ALL_ED
Name: Driss Hauser      : 1963      MRN: [id number]  Encounter Provider: Homero Diaz DO  Encounter Date: 2025   Encounter department: [facility]  :  Assessment & Plan  Primary osteoarthritis of left knee               Patient is here for his 1st injection of Orthovisc into the Left knee.     Physical exam of the knee shows no effusion, no ecchymosis.    Large joint arthrocentesis: L knee    Performed by: Ruth Ann Oliva PA-C  Authorized by: Homero Diaz DO    Universal Protocol:  Consent: Verbal consent obtained  Risks and benefits: risks, benefits and alternatives were discussed  Consent given by: patient  Patient understanding: patient states understanding of the procedure being performed  Patient identity confirmed: verbally with patient  Supporting Documentation  Indications: pain     Is this a Visco injection? Yes  Non-Pharmacologic Treatments Attempted: Home Exercise  Pharmacologic Treatments Attempted: cortisone  Pain Score: 6Procedure Details  Location: knee - L knee  Preparation: Patient was prepped and draped in the usual sterile fashion  Needle size: 22 G  Approach: anterolateral  Medications administered: 30 mg sodium hyaluronate 30 mg/2 mL    Patient tolerance: patient tolerated the procedure well with no immediate complications  Dressing:  Sterile dressing applied          Patient tolerated procedure follow up 1 week for 2nd injection    No

## 2025-07-07 NOTE — ED PROVIDER NOTE - NSFOLLOWUPINSTRUCTIONS_ED_ALL_ED_FT
You visited the Emergency Department for a dental issue. We recommend that you follow up with a dentist. If you have a private dentist, please contact them directly. If you do not have a dentist, we have a dental clinic that will do its best to accommodate you. To make an appointment, please call the dental clinic at [phone] and leave a voicemail or email them at [email or web address] for a response.     Temporomandibular Joint Syndrome  Side view of a human skull showing the temporomandibular joint.   Temporomandibular joint syndrome (TMJ syndrome) is a condition that causes pain in the temporomandibular joints. These joints are located near your ears and allow your jaw to open and close. For people with TMJ syndrome, chewing, biting, or other movements of the jaw can be difficult or painful.    TMJ syndrome is often mild and goes away within a few weeks. However, sometimes the condition becomes a long-term (chronic) problem.    What are the causes?  This condition may be caused by:  Grinding your teeth or clenching your jaw. Some people do this when they are stressed.  Arthritis.  An injury to the jaw.  A head or neck injury.  Teeth or dentures that are not aligned well.  In some cases, the cause of TMJ syndrome may not be known.    What are the signs or symptoms?  The most common symptom of this condition is aching pain on the side of the head in the area of the TMJ. Other symptoms may include:  Pain when moving your jaw, such as when chewing or biting.  Not being able to open your jaw all the way.  Making a clicking sound when you open your mouth.  Headache.  Earache.  Neck or shoulder pain.  How is this diagnosed?  This condition may be diagnosed based on:  Your symptoms and medical history.  A physical exam. Your health care provider may check the range of motion of your jaw.  Imaging tests, such as X-rays or an MRI.  You may also need to see your dentist, who will check if your teeth and jaw are lined up correctly.    How is this treated?  TMJ syndrome often goes away on its own. If treatment is needed, it may include:  Eating soft foods and applying ice or heat.  Medicines to relieve pain or inflammation.  Medicines or massage to relax the muscles.  A splint, bite plate, or mouthpiece to prevent teeth grinding or jaw clenching.  Relaxation techniques or counseling to help reduce stress.  A therapy for pain in which an electrical current is applied to the nerves through the skin (transcutaneous electrical nerve stimulation).  Acupuncture. This may help to relieve pain.  Jaw surgery. This is rarely needed.  Follow these instructions at home:  Bag of ice on a towel on the skin.   Eating and drinking    Eat a soft diet if you are having trouble chewing.  Avoid foods that require a lot of chewing. Do not chew gum.  General instructions    Take over-the-counter and prescription medicines only as told by your health care provider.  If directed, put ice on the painful area. To do this:  Put ice in a plastic bag.  Place a towel between your skin and the bag.  Leave the ice on for 20 minutes, 2–3 times a day.  Remove the ice if your skin turns bright red. This is very important. If you cannot feel pain, heat, or cold, you have a greater risk of damage to the area.  Apply a warm, wet cloth (warm compress) to the painful area as told.  Massage your jaw area and do any jaw stretching exercises as told by your health care provider.  If you were given a splint, bite plate, or mouthpiece, wear it as told by your health care provider.  Keep all follow-up visits. This is important.  Where to find more information  National [place] [employer]iofacial Research: www.nidcr.nih.gov  Contact a health care provider if:  You have trouble eating.  You have new or worsening symptoms.  Get help right away if:  Your jaw locks.  Summary  Temporomandibular joint syndrome (TMJ syndrome) is a condition that causes pain in the temporomandibular joints. These joints are located near your ears and allow your jaw to open and close.  TMJ syndrome is often mild and goes away within a few weeks. However, sometimes the condition becomes a long-term (chronic) problem.  Symptoms include an aching pain on the side of the head in the area of the TMJ, pain when chewing or biting, and being unable to open your jaw all the way. You may also make a clicking sound when you open your mouth.  TMJ syndrome often goes away on its own. If treatment is needed, it may include medicines to relieve pain, reduce inflammation, or relax the muscles. A splint, bite plate, or mouthpiece may also be used to prevent teeth grinding or jaw clenching.  This information is not intended to replace advice given to you by your health care provider. Make sure you discuss any questions you have with your health care provider. Usted visitó el Departamento de Emergencias por un problema dental. Le recomendamos que consulte con un dentista. Si tiene un dentista privado, comuníquese directamente con él. Si no tiene dentista, contamos con esmer clínica dental que hará todo lo posible por atenderlo. Para programar esmer richardson, llame a la clínica dental al [phone] y deje un mensaje de voz o envíeles un correo electrónico a [email or web address] para obtener esmer respuesta.    Síndrome de la Articulación Temporomandibular  Vista lateral de un cráneo humano que muestra la articulación temporomandibular. El síndrome de la articulación temporomandibular (síndrome de DINORAH) es esmer condición que causa dolor en las articulaciones temporomandibulares. Estas articulaciones están ubicadas cerca de rosa oídos y permiten que chisholm mandíbula se urbano y cierre. Para las personas con síndrome de DINORAH, masticar, morder u otros movimientos de la mandíbula pueden ser difíciles o dolorosos.    El síndrome de DINORAH suele ser leve y desaparece en pocas semanas. Sin embargo, a veces la condición se convierte en un problema a lucille plazo (crónico).    ¿Cuáles son las causas?  Esta condición puede ser causada por:    Rechinar los dientes o apretar la mandíbula. Algunas personas lo hacen cuando están estresadas.  Artritis.  Esmer lesión en la mandíbula.  Esmer lesión en la david o el renny.  Dientes o dentaduras postizas que no están panda alineados. En algunos casos, la causa del síndrome de DINORAH puede ser desconocida.  ¿Cuáles son los signos o síntomas?  El síntoma más común de esta condición es un dolor en el lado de la david en el área de la DINORAH. Otros síntomas pueden incluir:    Dolor al  la mandíbula, fabio al masticar o morder.  No poder abrir completamente la mandíbula.  Hacer un jv de clic al abrir la boca.  Dolor de david.  Dolor de oído.  Dolor de renny o hombro.  ¿Cómo se diagnostica?  Esta condición puede ser diagnosticada basándose en:    Rosa síntomas e historial médico.  Un examen físico. Chisholm proveedor de atención médica puede verificar el rango de movimiento de chisholm mandíbula.  Pruebas de imagen, fabio dustin X o esmer resonancia magnética. También puede necesitar dominic a chisholm dentista, quien verificará si rosa dientes y mandíbula están alineados correctamente.  ¿Cómo se trata?  El síndrome de DINORAH a menudo desaparece por sí solo. Si se necesita tratamiento, puede incluir:    [place] alimentos blandos y aplicar hielo o calor.  Medicamentos para aliviar el dolor o la inflamación.  Medicamentos o masajes para relajar los músculos.  Esmer férula, placa de mordida o protector bucal para prevenir el rechinar de dientes o el apretamiento de la mandíbula.  Técnicas de relajación o asesoramiento para ayudar a reducir el estrés.  Esmer terapia para el dolor en la que se aplica esmer corriente eléctrica a los nervios a través de la piel (estimulación eléctrica nerviosa transcutánea).  Acupuntura. [place] puede ayudar a aliviar el dolor.  Cirugía de mandíbula. [place] pina vez es necesario.  Siga estas instrucciones en casa:  Bolsa de hielo sobre esmer toalla en la piel.  Comida y bebida  Siga esmer dieta blanda si tiene problemas para masticar.  Evite alimentos que requieran mucha masticación. No mastique chicle.  Instrucciones generales  [place] medicamentos de venta kiesha y recetados solo fabio le indique chisholm proveedor de atención médica.  Si se le indica, coloque hielo en el área dolorida. Para hacer esto:  Ponga hielo en esmer bolsa de plástico.  Coloque esmer toalla entre chisholm piel y la bolsa.  Deje el hielo hortencia 20 minutos, 2-3 veces al día.  Retire el hielo si chisholm piel se vuelve renzo brillante. [place] es muy importante. Si no puede sentir dolor, calor o frío, tiene un mayor riesgo de daño en el área.  Aplique un paño húmedo y tibio (compresa tibia) en el área dolorida según las indicaciones.  Masajee el área de la mandíbula y realice cualquier ejercicio de estiramiento de la mandíbula según las indicaciones de chisholm proveedor de atención médica.  Si le dieron esmer férula, placa de mordida o protector bucal, úselo según las indicaciones de chisholm proveedor de atención médica.  Mantenga todas las visitas de seguimiento. [place] es importante.  Dónde encontrar más información  [employer] Dental [employer] Cr[employer]: www.nidcr.nih.gov    Contacte a un proveedor de atención médica si:  Tiene problemas para comer.  Tiene síntomas nuevos o que empeoran.  Busque ayuda de inmediato si:  Chisholm mandíbula se bloquea.  Resumen  El síndrome de la articulación temporomandibular (síndrome de DINORAH) es esmer condición que causa dolor en las articulaciones temporomandibulares. Estas articulaciones están ubicadas cerca de rosa oídos y permiten que chisholm mandíbula se urbano y cierre.  El síndrome de DINORAH suele ser leve y desaparece en pocas semanas. Sin embargo, a veces la condición se convierte en un problema a lucille plazo (crónico).  Los síntomas incluyen dolor en el lado de la david en el área de la DINORAH, dolor al masticar o morder, y no poder abrir la mandíbula por completo. También puede hacer un jv de clic al abrir la boca.  El síndrome de DINORAH a menudo desaparece por sí solo. Si se necesita tratamiento, puede incluir medicamentos para aliviar el dolor, reducir la inflamación o relajar los músculos. También se puede usar esmer férula, placa de mordida o protector bucal para prevenir el rechinar de dientes o el apretamiento de la mandíbula.  Esta información no pretende reemplazar los consejos dados por chisholm proveedor de atención médica. Asegúrese de discutir cualquier pregunta que tenga con chisholm proveedor de atención médica.

## 2025-07-07 NOTE — ED ADULT NURSE NOTE - NS_SISCREENINGSR_GEN_ALL_ED
February 24, 2020         Patient: Tere Weber   YOB: 1950           We have tried to return your phone call regarding test results and follow up visit from DR Wilkins office to no avail. Please call [phone] at your earliest convenience to receive the results.        Sincerely,       General Surgery Dept  [facility]        CC: No Recipients                                         Negative

## 2025-07-07 NOTE — ED PROVIDER NOTE - OBJECTIVE STATEMENT
29-year-old female with no past medical history presents to the emergency department with jaw pain for 2 months.  Patient reports pain with opening her mouth, on the right side.  Does not have a dentist.  Pain with eating or drinking.

## 2025-07-07 NOTE — ED ADULT TRIAGE NOTE - WEIGHT METHOD

## 2025-07-07 NOTE — ED PROVIDER NOTE - PATIENT PORTAL LINK FT
You can access the FollowMyHealth Patient Portal offered by [facility] by registering at the following website: [email or web address]. By joining "[employer]"’s FollowMyHealth portal, you will also be able to view your health information using other applications (apps) compatible with our system.

## 2025-07-14 NOTE — CHART NOTE - NSCHARTNOTEFT_GEN_A_CORE
Heber [id number] - Emailed ENT, CT 7/8. Inq sent, CT 7/11. Appt scheduled 08/13/2025 12:00 PM w/ Dr. Breaux @ [address] [place] [address], [place] 7/14 (ENT referral).

## 2025-07-22 ENCOUNTER — EMERGENCY (EMERGENCY)
Facility: HOSPITAL | Age: 30
LOS: 0 days | Discharge: HOME | End: 2025-07-22
Attending: EMERGENCY MEDICINE
Payer: MEDICAID

## 2025-07-22 VITALS
RESPIRATION RATE: 16 BRPM | HEART RATE: 97 BPM | OXYGEN SATURATION: 97 % | DIASTOLIC BLOOD PRESSURE: 64 MMHG | TEMPERATURE: 98 F | SYSTOLIC BLOOD PRESSURE: 108 MMHG

## 2025-07-22 DIAGNOSIS — Z90.49 ACQUIRED ABSENCE OF OTHER SPECIFIED PARTS OF DIGESTIVE TRACT: Chronic | ICD-10-CM

## 2025-07-22 DIAGNOSIS — L03.011 CELLULITIS OF RIGHT FINGER: ICD-10-CM

## 2025-07-22 PROCEDURE — 99283 EMERGENCY DEPT VISIT LOW MDM: CPT | Mod: 25

## 2025-07-22 PROCEDURE — 10060 I&D ABSCESS SIMPLE/SINGLE: CPT

## 2025-07-22 PROCEDURE — 99284 EMERGENCY DEPT VISIT MOD MDM: CPT | Mod: 25

## 2025-07-22 RX ORDER — CEPHALEXIN 250 MG/1
1 CAPSULE ORAL
Qty: 14 | Refills: 0
Start: 2025-07-22 | End: 2025-07-28

## 2025-07-22 NOTE — ED PROVIDER NOTE - CLINICAL SUMMARY MEDICAL DECISION MAKING FREE TEXT BOX
29-year-old woman complains of pain and swelling of right index finger mostly at the nailbed without constitutional symptoms.  She had some purulent drainage so came to the ED for further evaluation.  Vital signs, exam as noted, apparent paronychia as noted.  I&D was done, patient covered with antibiotics, to follow-up with hand.

## 2025-07-22 NOTE — ED ADULT TRIAGE NOTE - TELEPHONIC ID NUMBER OF THE INTERPRETER
[id number] Hemigard Intro: Due to skin fragility and wound tension, it was decided to use HEMIGARD adhesive retention suture devices to permit a linear closure. The skin was cleaned and dried for a 6cm distance away from the wound. Excessive hair, if present, was removed to allow for adhesion.

## 2025-07-22 NOTE — ED PROVIDER NOTE - NSFOLLOWUPINSTRUCTIONS_ED_ALL_ED_FT
Paronychia  Paronychia is an infection of the skin. It happens near a fingernail or toenail. It may cause pain and swelling around the nail. In some cases, a fluid-filled bump (abscess) can form near or under the nail.    Often, this condition is not serious, and it clears up with treatment.    What are the causes?  This condition may be caused by a germ. The germ may be bacteria or a fungus. These germs can enter the body through an opening in the skin, such as a cut or a hangnail. Other causes include:  Repeated injuries to your fingernails or toenails.  Irritation of the base and sides of the nail (cuticle).  What increases the risk?  This condition is more likely to develop in people who:  Get their hands wet often, such as a .  Bite their fingernails or the base and sides of their nails.  Have other skin problems.  Have hangnails or hurt fingertips.  Come into contact with chemicals like detergents.  Have diabetes.  What are the signs or symptoms?  Redness and swelling of the skin near the nail.  A tender feeling around the nail.  Pus-filled bumps under the skin at the base and sides of the nail.  Fluid or pus under the nail.  Pain in the area.  How is this treated?  Treatment depends on the cause of your condition and how bad it is. If your condition is mild, it may clear up on its own in a few days or after soaking in warm water. If needed, treatment may include:  Antibiotic medicine.  Antifungal medicine.  A procedure to drain pus from a fluid-filled bump.  Medicine to treat irritation and swelling (corticosteroids).  Taking off part of an ingrown toenail.  A bandage (dressing) may be placed over the nail area.    Follow these instructions at home:  Wound care    Keep the affected area clean.  Soak the fingers or toes in warm water as told by your doctor. You may be told to do this for 20 minutes, 2–3 times a day.  Keep the area dry when you are not soaking it.  Do not try to drain a fluid-filled bump on your own.  Follow instructions from your doctor about how to take care of the affected area. Make sure you:  Wash your hands with soap and water for at least 20 seconds before and after you change your bandage. If you cannot use soap and water, use hand .  Change your bandage as told by your doctor.  If you had a fluid-filled bump and your doctor drained it, check the area every day for signs of infection. Check for:  Redness, swelling, or pain.  Fluid or blood.  Warmth.  Pus or a bad smell.  Medicines    A prescription pill bottle with an example of a pill.  Take over-the-counter and prescription medicines only as told by your doctor.  If you were prescribed an antibiotic medicine, take it as told by your doctor. Do not stop taking it even if you start to feel better.  General instructions    Avoid contact with anything that irritates your skin or that you are allergic to.  Do not pick at the affected area.  Keep all follow-up visits.  Prevention    To prevent this condition from happening again:  Wear rubber gloves when putting your hands in water for washing dishes or other tasks.  Wear gloves if your hands might touch  or chemicals.  Avoid injuring your nails or fingertips.  Do not bite your nails or tear hangnails.  Do not cut your nails very short.  Do not cut the skin at the base and sides of the nail.  Use clean nail clippers or scissors when trimming nails.  Contact a doctor if:  You feel worse.  You do not get better.  You keep having or you have more fluid, blood, or pus coming from the affected area.  Your affected finger, toe, or joint gets swollen or hard to move.  You have a fever or chills.  There is redness spreading from the affected area.  Summary  Paronychia is an infection of the skin. It happens near a fingernail or toenail.  This condition may cause pain and swelling around the nail.  Soak the fingers or toes in warm water as told by your doctor.  Often, this condition is not serious, and it clears up with treatment.  This information is not intended to replace advice given to you by your health care provider. Make sure you discuss any questions you have with your health care provider.

## 2025-07-22 NOTE — ED PROVIDER NOTE - OBJECTIVE STATEMENT
29-year-old female no significant past medical history presents today for left digit swelling.  Patient noticed over the last several days she has had increasing swelling around her nailbed without fevers chills or pain.  Patient thought she had some pus drainage from it last night came in for further evaluation

## 2025-07-22 NOTE — ED PROVIDER NOTE - PHYSICAL EXAMINATION
CONSTITUTIONAL: Well-developed; well-nourished; in no acute distress.   SKIN: warm, dry, erythemia to the right index finger with mild fluctuance   HEAD: Normocephalic; atraumatic.  EYES: PERRL, EOMI, no conjunctival erythema  ENT: No nasal discharge; airway clear.  NECK: Supple; non tender.  CARD: Regular rate and rhythm.   RESP: No wheezes, rales or rhonchi.  ABD: soft ntnd  EXT: Normal ROM.  No clubbing, cyanosis or edema.   LYMPH: No acute cervical adenopathy.  NEURO: Alert, oriented, grossly unremarkable  PSYCH: Cooperative, appropriate.

## 2025-07-22 NOTE — ED PROVIDER NOTE - PATIENT PORTAL LINK FT
You can access the FollowMyHealth Patient Portal offered by [facility] by registering at the following website: [email or web address]. By joining [employer]’s FollowMyHealth portal, you will also be able to view your health information using other applications (apps) compatible with our system.

## 2025-07-22 NOTE — ED ADULT NURSE NOTE - NSFALLUNIVINTERV_ED_ALL_ED
Bed/Stretcher in lowest position, wheels locked, appropriate side rails in place/Call bell, personal items and telephone in reach/Instruct patient to call for assistance before getting out of bed/chair/stretcher/Non-slip footwear applied when patient is off stretcher/[place] to call system/Physically safe environment - no spills, clutter or unnecessary equipment/Purposeful proactive rounding/Room/bathroom lighting operational, light cord in reach

## 2025-08-05 ENCOUNTER — EMERGENCY (EMERGENCY)
Facility: HOSPITAL | Age: 30
LOS: 0 days | Discharge: HOME | End: 2025-08-06
Attending: EMERGENCY MEDICINE
Payer: MEDICAID

## 2025-08-05 VITALS
RESPIRATION RATE: 18 BRPM | WEIGHT: 157.41 LBS | DIASTOLIC BLOOD PRESSURE: 85 MMHG | SYSTOLIC BLOOD PRESSURE: 115 MMHG | HEART RATE: 99 BPM | TEMPERATURE: 99 F | OXYGEN SATURATION: 96 %

## 2025-08-05 DIAGNOSIS — Z90.49 ACQUIRED ABSENCE OF OTHER SPECIFIED PARTS OF DIGESTIVE TRACT: Chronic | ICD-10-CM

## 2025-08-05 DIAGNOSIS — Z87.2 PERSONAL HISTORY OF DISEASES OF THE SKIN AND SUBCUTANEOUS TISSUE: ICD-10-CM

## 2025-08-05 DIAGNOSIS — L03.011 CELLULITIS OF RIGHT FINGER: ICD-10-CM

## 2025-08-05 PROCEDURE — 99283 EMERGENCY DEPT VISIT LOW MDM: CPT

## 2025-08-05 PROCEDURE — 99284 EMERGENCY DEPT VISIT MOD MDM: CPT

## 2025-08-05 RX ORDER — CLINDAMYCIN PHOSPHATE 150 MG/ML
300 VIAL (ML) INJECTION ONCE
Refills: 0 | Status: COMPLETED | OUTPATIENT
Start: 2025-08-05 | End: 2025-08-05

## 2025-08-05 RX ORDER — CLINDAMYCIN PHOSPHATE 150 MG/ML
1 VIAL (ML) INJECTION
Qty: 30 | Refills: 0
Start: 2025-08-05 | End: 2025-08-14

## 2025-08-05 RX ADMIN — Medication 300 MILLIGRAM(S): at 23:21

## 2025-08-13 ENCOUNTER — APPOINTMENT (OUTPATIENT)
Dept: OTOLARYNGOLOGY | Facility: CLINIC | Age: 30
End: 2025-08-13